# Patient Record
Sex: MALE | Race: WHITE | NOT HISPANIC OR LATINO | Employment: FULL TIME | URBAN - METROPOLITAN AREA
[De-identification: names, ages, dates, MRNs, and addresses within clinical notes are randomized per-mention and may not be internally consistent; named-entity substitution may affect disease eponyms.]

---

## 2017-03-08 ENCOUNTER — ALLSCRIPTS OFFICE VISIT (OUTPATIENT)
Dept: OTHER | Facility: OTHER | Age: 62
End: 2017-03-08

## 2017-03-17 ENCOUNTER — HOSPITAL ENCOUNTER (OUTPATIENT)
Dept: RADIOLOGY | Facility: HOSPITAL | Age: 62
Discharge: HOME | End: 2017-03-17
Attending: RADIOLOGY

## 2017-03-17 DIAGNOSIS — Z76.89 REFERRAL OF PATIENT WITHOUT EXAMINATION OR TREATMENT: ICD-10-CM

## 2017-04-03 DIAGNOSIS — I71.4 ABDOMINAL AORTIC ANEURYSM WITHOUT RUPTURE (HCC): ICD-10-CM

## 2017-10-19 ENCOUNTER — TRANSCRIBE ORDERS (OUTPATIENT)
Dept: ADMINISTRATIVE | Facility: HOSPITAL | Age: 62
End: 2017-10-19

## 2017-10-19 DIAGNOSIS — I71.9 HYALINE NECROSIS OF AORTA (HCC): Primary | ICD-10-CM

## 2017-10-26 ENCOUNTER — HOSPITAL ENCOUNTER (OUTPATIENT)
Dept: NON INVASIVE DIAGNOSTICS | Facility: HOSPITAL | Age: 62
Discharge: HOME | End: 2017-10-26
Payer: COMMERCIAL

## 2017-10-26 DIAGNOSIS — I71.9 HYALINE NECROSIS OF AORTA (HCC): ICD-10-CM

## 2017-10-26 LAB
ARRHY DURING EX: NORMAL
CHEST PAIN STATEMENT: NORMAL
MAX DIASTOLIC BP: 96 MMHG
MAX HEART RATE: 142 BPM
MAX PREDICTED HEART RATE: 157 BPM
MAX. SYSTOLIC BP: 202 MMHG
PROTOCOL NAME: NORMAL
TARGET HR FORMULA: NORMAL
TEST INDICATION: NORMAL
TIME IN EXERCISE PHASE: 396 S

## 2017-10-26 PROCEDURE — 93017 CV STRESS TEST TRACING ONLY: CPT

## 2018-01-16 NOTE — PROGRESS NOTES
Assessment    1  AAA (abdominal aortic aneurysm) without rupture (441 4) (I71 4)    Discussion/Summary  Discussion Summary:   Approximately 5 cm abdominal aortic aneurysm based upon CT and ultrasound reports from an outside facility area and we discussed the pathophysiology of aneurysmal disease and the indications for further evaluation treatment  We will obtain the most recent CT scan and following this further treatment recommendations will be made  We also discussed the familial relationship of aneurysmal disease and the importance of screening of any first-degree relatives  Chief Complaint  Chief Complaint Free Text Note Form: " I am here to have a second opinion about my AAA "   Janeth Gimenez is here today to have his AAA evaluated  PT had CTA ABD / PEl done in 2015 & 2016  PT states he has been told that the AAA is a 5 cm or a 5 5 Cm , he would like to know for sure the size & if surgery is required  PT denies any abdominal pain , decrease in appetite, nausea , vomiting & no trouble passing a BM  PT denies any history of blood clots PT does not take any blood thinners, PT is a nonsmoker        History of Present Illness  HPI: 59-year-old with known abdominal aortic aneurysm presents for evaluation and treatment  Evidently the aneurysm was found approximate 5 years ago as an incidental finding  It has been followed at [facility]  He is been told recently the aneurysm is either 5 or 5 5 cm in size but he is unclear  He denies any abdominal or back pain  He remains active with no limitations  He specifically denies any claudication, rest pain, chest pain or shortness of breath  He states he exercises on a regular basis  He denies a family history of aneurysmal disease  PT states he has been told that the AAA is a 5 cm or a 5 5 Cm , he would like to know for sure the size & if surgery is required  PT denies any abdominal pain , decrease in appetite, nausea , vomiting & no trouble passing a BM   PT denies any history of blood clots PT does not take any blood thinners, PT is a nonsmoker      Review of Systems  Complete Male - Vasc:   Constitutional: no loss in appetite and feeling tired, but no fever, no recent weight gain, no chills and no recent weight loss  Eyes: No sudden vision loss, no blurred vision, no double vision  ENT: nasal discharge, but no earache, no nosebleeds, no sore throat, no hearing loss and no hoarseness  Cardiovascular: no chest pain, regular heart rate  Respiratory: no shortness of breath, no wheezing, no shortness of breath during exertion, no orthopnea and no complaints of PND, but cough  Gastrointestinal: No nausea, No vomiting, no diarrhea, no blood in stool  Genitourinary: no dysuria, no hematuria, No urinary incontinence, no erectile dysfunction  Musculoskeletal: no lumbar pain and joint stiffness, but no joint swelling, no limb pain, no myalgias and no limb swelling  Integumentary: no rash, no lesions, no wounds, no ulcer  Neurological: no dementia, no headache, no numbness, no limb weakness, no dizziness, no difficulty walking  Psychiatric: anxiety and no mood disorder, but no depression  Hematologic/Lymphatic: no bleeding disorder, no easy bruising  ROS Reviewed:   ROS reviewed  Past Medical History  Active Problems And Past Medical History Reviewed: The active problems and past medical history were reviewed and updated today  Surgical History    1  History of Hernia Repair   2  History of Knee Surgery   3  History of Shoulder Surgery  Surgical History Reviewed: The surgical history was reviewed and updated today  Family History  Mother    1  Family history of diabetes mellitus (V18 0) (Z83 3)  Family History Reviewed: The family history was reviewed and updated today  Social History    · Former smoker (X85 23) (T48 533)   · Occasional alcohol use  Social History Reviewed: The social history was reviewed and updated today  Current Meds   1  HydroCHLOROthiazide 25 MG Oral Tablet; Therapy: (Recorded:08Mar2017) to Recorded   2  Lisinopril 20 MG Oral Tablet; Therapy: (Recorded:08Mar2017) to Recorded   3  Zolpidem Tartrate 10 MG Oral Tablet; Therapy: (Recorded:08Mar2017) to Recorded  Medication List Reviewed: The medication list was reviewed and updated today  Allergies    1  No Known Drug Allergies    Vitals  Vital Signs    Recorded: [id number] 03:25PM   Heart Rate 78, R Radial   Pulse Quality Normal, R Radial   Respiration Quality Normal   Respiration 16   Systolic 667, RUE, Sitting   Diastolic 60, RUE, Sitting   Height 5 ft 10 in   Weight 239 lb    BMI Calculated 34 29   BSA Calculated 2 25     Physical Exam    Carotid: right 2+, no bruit heard on the right, left 2+ and no bruit on the left  Brachial: right 2+ and left 2+  Radial: right 2+ and left 2+  Femoral: right 2+, no bruit heard on the right, left 2+ and no bruit on the left  Popliteal: right 2+ and left 2+  Posterior tibialis: right 2+ and left 2+  Distal Pulse Exam: Normal Capillary Refill  Extremities: bilateral ankle pitting edema, bilateral pretibial pitting edema and no foot edema  LE Varicose Veins: No Varicose Veins are Present  The heart rate was normal  The rhythm was regular  Heart sounds: normal S1, normal S2, no S3 and no S4  Murmurs: No murmurs were heard  Pulmonary   Respiratory effort: No increased work of breathing or signs of respiratory distress  Auscultation of lungs: Clear to auscultation  No wheezing, no rales, no rhonchi  Abdomen   Abdomen: Abdomen soft, non-tender, no masses, non distended, no rebound tenderness  Palpable aneurysm  No bruit heard  Pulsatile mass which is nontender consistent with aortic aneurysm  Psychiatric   Orientation to person, place and time: Normal    Mood and affect: Normal    Eyes   Pupils and irises: Equal, round and reactive to light     Ears, Nose, Mouth, and Throat   Hearing: Normal  Neck   Neck: No jugular venous distention, normal ROM and extension  No cervical adenopathy, trachea midline, neck supple  Neurologic Sensory exam normal   Motor skills intact  Musculoskeletal   Gait and station: Normal    Muscle strength/tone: Normal    Skin   Skin and subcutaneous tissue: Normal without rashes or lesions  Palpation of skin and subcutaneous tissue: Normal turgor  Venous Disease: No lipodermatosclerosis, stasis dermatitis, hyperpigmentation, or atrophie chris noted on exam       Results/Data  Diagnostic Studies Reviewed Vasc: I personally reviewed the films/images/results in the office today  My interpretation follows  Vascular Study Review Duplex report from outside facility 4/5/16 with 4 9 cm AAA  CT Scan Review CT scan 10/30/16 from outside facility  Unfortunately the images are not available today though her report indicates a 4 9 cm infrarenal aortic aneurysm with suggestion of focal dissection with intramural thrombus        Signatures   Electronically signed by : Corina Jason MD; Mar  8 2017  4:22PM EST                       (Author)

## 2018-01-22 VITALS
SYSTOLIC BLOOD PRESSURE: 122 MMHG | HEIGHT: 70 IN | WEIGHT: 239 LBS | DIASTOLIC BLOOD PRESSURE: 60 MMHG | RESPIRATION RATE: 16 BRPM | HEART RATE: 78 BPM | BODY MASS INDEX: 34.22 KG/M2

## 2018-02-26 ENCOUNTER — TELEPHONE (OUTPATIENT)
Dept: ADMINISTRATIVE | Facility: HOSPITAL | Age: 63
End: 2018-02-26

## 2018-02-27 NOTE — TELEPHONE ENCOUNTER
Called pt and left a VM for pt to call back and schedule AOIL prior to OV on 03/01/18 with Dr Felix Velasquez

## 2018-02-28 ENCOUNTER — HOSPITAL ENCOUNTER (OUTPATIENT)
Dept: NON INVASIVE DIAGNOSTICS | Facility: CLINIC | Age: 63
Discharge: HOME | End: 2018-02-28
Payer: COMMERCIAL

## 2018-02-28 DIAGNOSIS — I71.4 ABDOMINAL AORTIC ANEURYSM WITHOUT RUPTURE (HCC): ICD-10-CM

## 2018-02-28 PROCEDURE — 93978 VASCULAR STUDY: CPT

## 2018-03-01 ENCOUNTER — LAB (OUTPATIENT)
Dept: LAB | Facility: CLINIC | Age: 63
End: 2018-03-01
Payer: COMMERCIAL

## 2018-03-01 ENCOUNTER — OFFICE VISIT (OUTPATIENT)
Dept: VASCULAR SURGERY | Facility: CLINIC | Age: 63
End: 2018-03-01
Payer: COMMERCIAL

## 2018-03-01 VITALS — DIASTOLIC BLOOD PRESSURE: 82 MMHG | SYSTOLIC BLOOD PRESSURE: 122 MMHG | HEART RATE: 70 BPM | RESPIRATION RATE: 21 BRPM

## 2018-03-01 DIAGNOSIS — I71.4 AAA (ABDOMINAL AORTIC ANEURYSM) WITHOUT RUPTURE (HCC): Primary | Chronic | ICD-10-CM

## 2018-03-01 DIAGNOSIS — I71.4 AAA (ABDOMINAL AORTIC ANEURYSM) WITHOUT RUPTURE (HCC): Chronic | ICD-10-CM

## 2018-03-01 LAB
ANION GAP SERPL CALCULATED.3IONS-SCNC: 7 MMOL/L (ref 4–13)
BUN SERPL-MCNC: 13 MG/DL (ref 5–25)
CALCIUM SERPL-MCNC: 8.4 MG/DL (ref 8.3–10.1)
CHLORIDE SERPL-SCNC: 105 MMOL/L (ref 100–108)
CO2 SERPL-SCNC: 25 MMOL/L (ref 21–32)
CREAT SERPL-MCNC: 0.99 MG/DL (ref 0.6–1.3)
GFR SERPL CREATININE-BSD FRML MDRD: 81 ML/MIN/1.73SQ M
GLUCOSE SERPL-MCNC: 96 MG/DL (ref 65–140)
POTASSIUM SERPL-SCNC: 3.6 MMOL/L (ref 3.5–5.3)
SODIUM SERPL-SCNC: 137 MMOL/L (ref 136–145)

## 2018-03-01 PROCEDURE — 99214 OFFICE O/P EST MOD 30 MIN: CPT | Performed by: SURGERY

## 2018-03-01 PROCEDURE — 36415 COLL VENOUS BLD VENIPUNCTURE: CPT

## 2018-03-01 PROCEDURE — 80048 BASIC METABOLIC PNL TOTAL CA: CPT

## 2018-03-01 PROCEDURE — 93978 VASCULAR STUDY: CPT | Performed by: SURGERY

## 2018-03-01 RX ORDER — LISINOPRIL 20 MG/1
20 TABLET ORAL DAILY
Status: ON HOLD | COMMUNITY
End: 2018-04-24

## 2018-03-01 RX ORDER — HYDROCHLOROTHIAZIDE 25 MG/1
25 TABLET ORAL DAILY
COMMUNITY
End: 2018-08-23 | Stop reason: SDUPTHER

## 2018-03-01 RX ORDER — ZOLPIDEM TARTRATE 10 MG/1
10 TABLET ORAL
COMMUNITY

## 2018-03-01 NOTE — PROGRESS NOTES
Assessment/Plan:    AAA (abdominal aortic aneurysm) without rupture (HCC)  5 8 cm infrarenal abdominal aortic aneurysm by recent duplex evaluation  We discussed this finding along with the indications for further evaluation treatment  At this time will plan on proceeding with CT angiogram to better define aneurysm size and anatomy in planning treatment  Diagnoses and all orders for this visit:    AAA (abdominal aortic aneurysm) without rupture ([facility] [address] )  -     Basic metabolic panel; Future  -     Ambulatory referral to Cardiology; Future  -     CTA abdomen pelvis w wo contrast; Future    Other orders  -     lisinopril (ZESTRIL) 20 mg tablet; Take by mouth  -     zolpidem (AMBIEN) 10 mg tablet; Take by mouth  -     hydrochlorothiazide (HYDRODIURIL) 25 mg tablet; Take by mouth As needed          Subjective:      Patient ID: Landon Villalobos is a 60 y o  male  70-year-old with known abdominal aortic aneurysm presents following recent aortic duplex  He denies any chronic abdominal or back pain though he does have some mild tenderness following recent aortic duplex  He denies any limitations in activity  Specifically he denies any shortness of breath, chest pain or claudication symptoms  The following portions of the patient's history were reviewed and updated as appropriate: allergies, current medications, past family history, past medical history, past social history, past surgical history and problem list     Review of Systems   Constitutional: Positive for fatigue  HENT: Positive for congestion  Eyes: Negative  Respiratory: Positive for cough  Gastrointestinal: Positive for abdominal pain  Endocrine: Negative  Genitourinary: Positive for frequency  Musculoskeletal: Negative  Skin: Negative  Allergic/Immunologic: Positive for environmental allergies and food allergies  Neurological: Negative  Hematological: Negative      Psychiatric/Behavioral: Positive for sleep disturbance  Objective:      /82 (BP Location: Right arm, Patient Position: Sitting, Cuff Size: Standard)   Pulse 70   Resp 21          Physical Exam   Constitutional: He is oriented to person, place, and time  He appears well-developed and well-nourished  HENT:   Head: Normocephalic and atraumatic  Eyes: Conjunctivae and EOM are normal    Neck: Normal range of motion  Neck supple  Cardiovascular: Normal rate, regular rhythm, S1 normal, S2 normal and normal heart sounds  No murmur heard  Pulses:       Carotid pulses are 2+ on the right side, and 2+ on the left side  Radial pulses are 2+ on the right side, and 2+ on the left side  Femoral pulses are 2+ on the right side, and 2+ on the left side  Popliteal pulses are 2+ on the right side, and 2+ on the left side  Posterior tibial pulses are 2+ on the right side, and 2+ on the left side  Pulmonary/Chest: Effort normal and breath sounds normal    Abdominal: Soft  He exhibits pulsatile midline mass  He exhibits no abdominal bruit  There is no tenderness  No hernia  Musculoskeletal: Normal range of motion  He exhibits no edema, tenderness or deformity  Neurological: He is alert and oriented to person, place, and time  No cranial nerve deficit  Skin: Skin is warm, dry and intact  Psychiatric: He has a normal mood and affect

## 2018-03-01 NOTE — ASSESSMENT & PLAN NOTE
5 8 cm infrarenal abdominal aortic aneurysm by recent duplex evaluation  We discussed this finding along with the indications for further evaluation treatment  At this time will plan on proceeding with CT angiogram to better define aneurysm size and anatomy in planning treatment

## 2018-03-01 NOTE — LETTER
March 1, 2018     Verónica Martinez, [address]  [address]  [facility] [address]    Patient: Anika Mahan   YOB: 1955   Date of Visit: 3/1/2018       Dear Dr Pradip Shah:    Thank you for referring Dania Hidalgo to me for evaluation  Below are the relevant portions of my assessment and plan of care  AAA (abdominal aortic aneurysm) without rupture (HCC)  5 8 cm infrarenal abdominal aortic aneurysm by recent duplex evaluation  We discussed this finding along with the indications for further evaluation treatment  At this time will plan on proceeding with CT angiogram to better define aneurysm size and anatomy in planning treatment  If you have questions, please do not hesitate to call me  I look forward to following Fredrick Loco along with you           Sincerely,        Fernando Lane MD        CC: No Recipients

## 2018-03-01 NOTE — PATIENT INSTRUCTIONS
AAA (abdominal aortic aneurysm) without rupture (HCC)  5 8 cm infrarenal abdominal aortic aneurysm by recent duplex evaluation  We discussed this finding along with the indications for further evaluation treatment  At this time will plan on proceeding with CT angiogram to better define aneurysm size and anatomy in planning treatment

## 2018-03-05 ENCOUNTER — HOSPITAL ENCOUNTER (OUTPATIENT)
Dept: CT IMAGING | Facility: CLINIC | Age: 63
Discharge: HOME | End: 2018-03-05
Payer: COMMERCIAL

## 2018-03-05 DIAGNOSIS — I71.4 AAA (ABDOMINAL AORTIC ANEURYSM) WITHOUT RUPTURE (HCC): Chronic | ICD-10-CM

## 2018-03-05 PROCEDURE — 74174 CTA ABD&PLVS W/CONTRAST: CPT

## 2018-03-05 RX ADMIN — IOHEXOL 100 ML: 350 INJECTION, SOLUTION INTRAVENOUS at 13:14

## 2018-03-07 ENCOUNTER — HOSPITAL ENCOUNTER (EMERGENCY)
Facility: HOSPITAL | Age: 63
Discharge: HOME | End: 2018-03-08
Attending: EMERGENCY MEDICINE | Admitting: EMERGENCY MEDICINE
Payer: COMMERCIAL

## 2018-03-07 DIAGNOSIS — R07.89 MUSCULOSKELETAL CHEST PAIN: Primary | ICD-10-CM

## 2018-03-07 LAB
ANION GAP BLD CALC-SCNC: 14 MMOL/L (ref 4–13)
BUN BLD-MCNC: 8 MG/DL (ref 5–25)
CA-I BLD-SCNC: 1.04 MMOL/L (ref 1.12–1.32)
CHLORIDE BLD-SCNC: 105 MMOL/L (ref 100–108)
CREAT BLD-MCNC: 0.9 MG/DL (ref 0.6–1.3)
GFR SERPL CREATININE-BSD FRML MDRD: 91 ML/MIN/1.73SQ M
GLUCOSE SERPL-MCNC: 164 MG/DL (ref 65–140)
HCT VFR BLD CALC: 43 % (ref 36.5–49.3)
HGB BLDA-MCNC: 14.6 G/DL (ref 12–17)
PCO2 BLD: 26 MMOL/L (ref 21–32)
POTASSIUM BLD-SCNC: 3.2 MMOL/L (ref 3.5–5.3)
SODIUM BLD-SCNC: 141 MMOL/L (ref 136–145)
SPECIMEN SOURCE: ABNORMAL
SPECIMEN SOURCE: NORMAL
TROPONIN I BLD-MCNC: 0 NG/ML (ref 0–0.08)

## 2018-03-07 PROCEDURE — 85014 HEMATOCRIT: CPT

## 2018-03-07 PROCEDURE — 81001 URINALYSIS AUTO W/SCOPE: CPT | Performed by: EMERGENCY MEDICINE

## 2018-03-07 PROCEDURE — 85610 PROTHROMBIN TIME: CPT | Performed by: EMERGENCY MEDICINE

## 2018-03-07 PROCEDURE — 93005 ELECTROCARDIOGRAM TRACING: CPT

## 2018-03-07 PROCEDURE — 85025 COMPLETE CBC W/AUTO DIFF WBC: CPT | Performed by: EMERGENCY MEDICINE

## 2018-03-07 PROCEDURE — 36415 COLL VENOUS BLD VENIPUNCTURE: CPT | Performed by: EMERGENCY MEDICINE

## 2018-03-07 PROCEDURE — 80047 BASIC METABLC PNL IONIZED CA: CPT

## 2018-03-07 PROCEDURE — 96360 HYDRATION IV INFUSION INIT: CPT

## 2018-03-07 PROCEDURE — 84484 ASSAY OF TROPONIN QUANT: CPT

## 2018-03-07 PROCEDURE — 85730 THROMBOPLASTIN TIME PARTIAL: CPT | Performed by: EMERGENCY MEDICINE

## 2018-03-07 RX ADMIN — SODIUM CHLORIDE 1000 ML: 0.9 INJECTION, SOLUTION INTRAVENOUS at 23:35

## 2018-03-08 ENCOUNTER — APPOINTMENT (EMERGENCY)
Dept: CT IMAGING | Facility: HOSPITAL | Age: 63
End: 2018-03-08
Payer: COMMERCIAL

## 2018-03-08 VITALS
TEMPERATURE: 97 F | DIASTOLIC BLOOD PRESSURE: 92 MMHG | HEART RATE: 63 BPM | BODY MASS INDEX: 33.64 KG/M2 | OXYGEN SATURATION: 94 % | SYSTOLIC BLOOD PRESSURE: 174 MMHG | WEIGHT: 235 LBS | HEIGHT: 70 IN | RESPIRATION RATE: 22 BRPM

## 2018-03-08 LAB
APTT PPP: 36 SECONDS (ref 23–35)
BACTERIA UR QL AUTO: ABNORMAL /HPF
BASOPHILS # BLD AUTO: 0.05 THOUSANDS/ΜL (ref 0–0.1)
BASOPHILS NFR BLD AUTO: 1 % (ref 0–1)
BILIRUB UR QL STRIP: NEGATIVE
CLARITY UR: CLEAR
COLOR UR: YELLOW
EOSINOPHIL # BLD AUTO: 0.24 THOUSAND/ΜL (ref 0–0.61)
EOSINOPHIL NFR BLD AUTO: 4 % (ref 0–6)
ERYTHROCYTE [DISTWIDTH] IN BLOOD BY AUTOMATED COUNT: 15.1 % (ref 11.6–15.1)
GLUCOSE UR STRIP-MCNC: NEGATIVE MG/DL
HCT VFR BLD AUTO: 43.9 % (ref 36.5–49.3)
HGB BLD-MCNC: 14.6 G/DL (ref 12–17)
HGB UR QL STRIP.AUTO: ABNORMAL
INR PPP: 1.17 (ref 0.86–1.16)
KETONES UR STRIP-MCNC: NEGATIVE MG/DL
LEUKOCYTE ESTERASE UR QL STRIP: NEGATIVE
LYMPHOCYTES # BLD AUTO: 2.43 THOUSANDS/ΜL (ref 0.6–4.47)
LYMPHOCYTES NFR BLD AUTO: 36 % (ref 14–44)
MCH RBC QN AUTO: 32 PG (ref 26.8–34.3)
MCHC RBC AUTO-ENTMCNC: 33.3 G/DL (ref 31.4–37.4)
MCV RBC AUTO: 96 FL (ref 82–98)
MONOCYTES # BLD AUTO: 0.78 THOUSAND/ΜL (ref 0.17–1.22)
MONOCYTES NFR BLD AUTO: 12 % (ref 4–12)
NEUTROPHILS # BLD AUTO: 3.17 THOUSANDS/ΜL (ref 1.85–7.62)
NEUTS SEG NFR BLD AUTO: 47 % (ref 43–75)
NITRITE UR QL STRIP: NEGATIVE
NON-SQ EPI CELLS URNS QL MICRO: ABNORMAL /HPF
PH UR STRIP.AUTO: 7 [PH] (ref 4.5–8)
PLATELET # BLD AUTO: 103 THOUSANDS/UL (ref 149–390)
PMV BLD AUTO: 10.2 FL (ref 8.9–12.7)
PROT UR STRIP-MCNC: NEGATIVE MG/DL
PROTHROMBIN TIME: 14.7 SECONDS (ref 12.1–14.4)
RBC # BLD AUTO: 4.56 MILLION/UL (ref 3.88–5.62)
RBC #/AREA URNS AUTO: ABNORMAL /HPF
SP GR UR STRIP.AUTO: 1.02 (ref 1–1.03)
SPECIMEN SOURCE: NORMAL
TROPONIN I BLD-MCNC: 0.02 NG/ML (ref 0–0.08)
UROBILINOGEN UR QL STRIP.AUTO: 0.2 E.U./DL
WBC # BLD AUTO: 6.67 THOUSAND/UL (ref 4.31–10.16)
WBC #/AREA URNS AUTO: ABNORMAL /HPF

## 2018-03-08 PROCEDURE — 96361 HYDRATE IV INFUSION ADD-ON: CPT

## 2018-03-08 PROCEDURE — 84484 ASSAY OF TROPONIN QUANT: CPT

## 2018-03-08 PROCEDURE — 74175 CTA ABDOMEN W/CONTRAST: CPT

## 2018-03-08 PROCEDURE — 99285 EMERGENCY DEPT VISIT HI MDM: CPT

## 2018-03-08 PROCEDURE — 71275 CT ANGIOGRAPHY CHEST: CPT

## 2018-03-08 PROCEDURE — 93005 ELECTROCARDIOGRAM TRACING: CPT | Performed by: EMERGENCY MEDICINE

## 2018-03-08 RX ORDER — POTASSIUM CHLORIDE 20 MEQ/1
20 TABLET, EXTENDED RELEASE ORAL ONCE
Status: DISCONTINUED | OUTPATIENT
Start: 2018-03-08 | End: 2018-03-08

## 2018-03-08 RX ORDER — POTASSIUM CHLORIDE 14.9 MG/ML
20 INJECTION INTRAVENOUS ONCE
Status: DISCONTINUED | OUTPATIENT
Start: 2018-03-08 | End: 2018-03-08

## 2018-03-08 RX ADMIN — IOHEXOL 125 ML: 350 INJECTION, SOLUTION INTRAVENOUS at 02:26

## 2018-03-08 NOTE — ED NOTES
Pt ambulated to bathroom to void without difficulty  Denies any chest discomfort, no SOB, tolerated well        Kendal Bowen, RN  03/08/18 0733

## 2018-03-08 NOTE — ED PROVIDER NOTES
History  Chief Complaint   Patient presents with    Back Pain     HX OF ANEURYSM    Chest Pain      This 67-year-old man with history of abdominal aortic aneurysm and hypertension states that he has had intermittent pain just to the right of the midthoracic spine intermittently for about a week  This has been constant since 7 p m  tonight and has not resolved  Feels better when he pushes against it otherwise there are no exacerbating or other alleviating factors  Patient has had intermittent abdominal pain for several days  He had a CTA of his abdominal aorta for follow-up on a fusiform infrarenal AAA  Currently it has enlarged to 59mm x 58mm he is concerned that this pain is lasting longer than before and may be due to his aneurysm  Patient denies nausea, diaphoresis, dyspnea, palpitations and syncope  Prior to Admission Medications   Prescriptions Last Dose Informant Patient Reported? Taking?   hydrochlorothiazide (HYDRODIURIL) 25 mg tablet   Yes No   Sig: Take by mouth As needed   lisinopril (ZESTRIL) 20 mg tablet   Yes No   Sig: Take by mouth   zolpidem (AMBIEN) 10 mg tablet   Yes No   Sig: Take by mouth      Facility-Administered Medications: None       Past Medical History:   Diagnosis Date    Abdominal aortic aneurysm (AAA) ([facility] [address] )     Liver disease        Past Surgical History:   Procedure Laterality Date    HERNIA REPAIR         Family History   Problem Relation Age of Onset    Pancreatic cancer Mother     Diabetes Mother     Cancer Father     Diabetes Father      I have reviewed and agree with the history as documented  Social History   Substance Use Topics    Smoking status: Former Smoker     Quit date: 1990    Smokeless tobacco: Never Used    Alcohol use Yes      Comment: Ocassional        Review of Systems   Constitutional: Negative  HENT: Negative  Eyes: Negative  Respiratory: Negative  Cardiovascular: Negative  Gastrointestinal: Negative    Abdominal pain: intermittently for the last 2 weeks  Endocrine: Negative  Genitourinary: Negative  Musculoskeletal: Negative  Back pain:  intermittent pain thoracic region for one week  Skin: Negative  Allergic/Immunologic: Negative  Neurological: Negative  Hematological: Negative  Psychiatric/Behavioral: Positive for dysphoric mood and sleep disturbance  All other systems reviewed and are negative  Physical Exam  ED Triage Vitals [03/07/18 2330]   Temperature Pulse Respirations Blood Pressure SpO2   (!) 96 8 °F (36 °C) 73 (!) 27 (!) 195/107 98 %      Temp Source Heart Rate Source Patient Position - Orthostatic VS BP Location FiO2 (%)   Temporal Monitor Sitting Right arm --      Pain Score       6           Orthostatic Vital Signs  Vitals:    03/08/18 0400 03/08/18 0415 03/08/18 0416 03/08/18 0430   BP: 167/94 169/86  (!) 174/92   Pulse: 62 64 63 63   Patient Position - Orthostatic VS: Sitting Sitting         Physical Exam   Constitutional: He is oriented to person, place, and time  He appears well-developed and well-nourished  No distress  HENT:   Head: Normocephalic and atraumatic  Right Ear: External ear normal    Left Ear: External ear normal    Mouth/Throat: Oropharynx is clear and moist    Eyes: Conjunctivae and EOM are normal  Pupils are equal, round, and reactive to light  Neck: Normal range of motion  Neck supple  No JVD present  Cardiovascular: Normal rate, regular rhythm, normal heart sounds and intact distal pulses  No murmur heard  Pulmonary/Chest: Effort normal and breath sounds normal  Tenderness:  palpation of theRight thoracic paraspinal muscles reproduces patient's symptoms  Abdominal: Soft  Bowel sounds are normal  He exhibits no distension and no mass  Tenderness:  mild generalized  There is no rebound and no guarding  Musculoskeletal: Normal range of motion  He exhibits no edema or tenderness  Lymphadenopathy:     He has no cervical adenopathy     Neurological: He is alert and oriented to person, place, and time  He has normal reflexes  No cranial nerve deficit  Coordination normal    Skin: Skin is warm and dry  Capillary refill takes less than 2 seconds  No rash noted  He is not diaphoretic  Psychiatric: He has a normal mood and affect  His behavior is normal    Nursing note and vitals reviewed  ED Medications  Medications   sodium chloride 0 9 % bolus 1,000 mL (0 mL Intravenous Stopped 3/8/18 0130)   iohexol (OMNIPAQUE) 350 MG/ML injection (MULTI-DOSE) 125 mL (125 mL Intravenous Given 3/8/18 0226)       Diagnostic Studies  Results Reviewed     Procedure Component Value Units Date/Time    POCT troponin [[id number]]  (Normal) Collected:  03/08/18 0330    Lab Status:  Final result Updated:  03/08/18 0344     POC Troponin I 0 02 ng/ml      Specimen Type VENOUS    Narrative:         [employer]ott i-Stat handheld analyzer 99% cutoff is > 0 08ng/mL in [facility] Emergency Departments    o cTnI 99% cutoff is useful only when applied to patients in the clinical setting of myocardial ischemia  o cTnI 99% cutoff should be interpreted in the context of clinical history, ECG findings and possibly cardiac imaging to establish correct diagnosis  o cTnI 99% cutoff may be suggestive but clearly not indicative of a coronary event without the clinical setting of myocardial ischemia  Protime-INR [[id number]]  (Abnormal) Collected:  03/07/18 2333    Lab Status:  Final result Specimen:  Blood from Arm, Left Updated:  03/08/18 0109     Protime 14 7 (H) seconds      INR 1 17 (H)    APTT [[id number]]  (Abnormal) Collected:  03/07/18 2333    Lab Status:  Final result Specimen:  Blood from Arm, Left Updated:  03/08/18 0109     PTT 36 (H) seconds     Narrative:          Therapeutic Heparin Range = 60-90 seconds    Urine Microscopic [[id number]]  (Abnormal) Collected:  03/07/18 2342    Lab Status:  Final result Specimen:  Urine from Urine, Clean Catch Updated:  03/08/18 0056     RBC, UA 1-2 (A) /hpf      WBC, UA None Seen /hpf      Epithelial Cells Occasional /hpf      Bacteria, UA Occasional /hpf     UA w Reflex to Microscopic [[id number]]  (Abnormal) Collected:  03/07/18 2342    Lab Status:  Final result Specimen:  Urine from Urine, Clean Catch Updated:  03/08/18 0041     Color, UA Yellow     Clarity, UA Clear     Specific [place], UA 1 020     pH, UA 7 0     Leukocytes, UA Negative     Nitrite, UA Negative     Protein, UA Negative mg/dl      Glucose, UA Negative mg/dl      Ketones, UA Negative mg/dl      Urobilinogen, UA 0 2 E U /dl      Bilirubin, UA Negative     Blood, UA Small (A)    CBC and differential [[id number]]  (Abnormal) Collected:  03/07/18 2333    Lab Status:  Final result Specimen:  Blood from Arm, Left Updated:  03/08/18 0033     WBC 6 67 Thousand/uL      RBC 4 56 Million/uL      Hemoglobin 14 6 g/dL      Hematocrit 43 9 %      MCV 96 fL      MCH 32 0 pg      MCHC 33 3 g/dL      RDW 15 1 %      MPV 10 2 fL      Platelets 050 (L) Thousands/uL      Neutrophils Relative 47 %      Lymphocytes Relative 36 %      Monocytes Relative 12 %      Eosinophils Relative 4 %      Basophils Relative 1 %      Neutrophils Absolute 3 17 Thousands/µL      Lymphocytes Absolute 2 43 Thousands/µL      Monocytes Absolute 0 78 Thousand/µL      Eosinophils Absolute 0 24 Thousand/µL      Basophils Absolute 0 05 Thousands/µL     POCT troponin [[id number]]  (Normal) Collected:  03/07/18 2344    Lab Status:  Final result Updated:  03/07/18 2357     POC Troponin I 0 00 ng/ml      Specimen Type VENOUS    Narrative:         [employer] i-Stat handheld analyzer 99% cutoff is > 0 08ng/mL in network Emergency Departments    o cTnI 99% cutoff is useful only when applied to patients in the clinical setting of myocardial ischemia  o cTnI 99% cutoff should be interpreted in the context of clinical history, ECG findings and possibly cardiac imaging to establish correct diagnosis    o cTnI 99% cutoff may be suggestive but clearly not indicative of a coronary event without the clinical setting of myocardial ischemia  POCT Chem 8+ [[id number]]  (Abnormal) Collected:  03/07/18 2341    Lab Status:  Final result Updated:  03/07/18 2357     SODIUM, I-STAT 141 mmol/l      Potassium, i-STAT 3 2 (L) mmol/L      Chloride, istat 105 mmol/L      CO2, i-STAT 26 mmol/L      Anion Gap, Istat 14 (H) mmol/L      Calcium, Ionized i-STAT 1 04 (L) mmol/L      BUN, I-STAT 8 mg/dl      Creatinine, i-STAT 0 9 mg/dl      eGFR 91 ml/min/1 73sq m      Glucose, i-STAT 164 (H) mg/dl      Hct, i-STAT 43 %      Hgb, i-STAT 14 6 g/dl      Specimen Type VENOUS                 CTA dissection protocol chest and abdomen    (Results Pending)              Procedures  ECG 12 Lead Documentation  Date/Time: 3/7/2018 11:23 PM  Performed by: Beckie Gonsales  Authorized by: Beckie Gonsales     ECG reviewed by me, the ED Provider: yes    Patient location:  ED  Previous ECG:     Previous ECG:  Unavailable  Interpretation:     Interpretation: normal             Phone Contacts  ED Phone Contact    ED Course  ED Course as of Mar 08 0440   Thu Mar 08, 2018   0336   Repeat EKG at 3:23 AM is unchanged    1428  Patient states that his back pain radiating to the right side of his chest became worse as he moved from CT table to the stretcher  Afterwards pain resolved and he feels fine now  0231   Patient is pain-free            HEART Risk Score    Flowsheet Row Most Recent Value   History  0 Filed at: 03/08/2018 0415   ECG  0 Filed at: 03/08/2018 0415   Age  1 Filed at: 03/08/2018 0415   Risk Factors  1 Filed at: 03/08/2018 0415   Troponin  0 Filed at: 03/08/2018 0415   Heart Score Risk Calculator   History  0 Filed at: 03/08/2018 0415   ECG  0 Filed at: 03/08/2018 0415   Age  1 Filed at: 03/08/2018 0415   Risk Factors  1 Filed at: 03/08/2018 0415   Troponin  0 Filed at: 03/08/2018 0415   HEART Score  2 Filed at: 03/08/2018 0415   HEART Score  2 Filed at: 03/08/2018 6114 MDM  Number of Diagnoses or Management Options  Musculoskeletal chest pain: new and requires workup     Amount and/or Complexity of Data Reviewed  Clinical lab tests: ordered and reviewed  Tests in the radiology section of CPT®: ordered and reviewed  Review and summarize past medical records: yes  Independent visualization of images, tracings, or specimens: yes      CritCare Time    Disposition  Final diagnoses:   Musculoskeletal chest pain     Time reflects when diagnosis was documented in both MDM as applicable and the Disposition within this note     Time User Action Codes Description Comment    3/8/2018  4:19 AM Tae Tiwari Add [R07 89] Musculoskeletal chest pain       ED Disposition     ED Disposition Condition Comment    Discharge  [facility] discharge to home/self care  Condition at discharge: Stable        Follow-up Information     Follow up With Specialties Details Why Contact Info    ELIZABETH Gil Nurse Practitioner Call today  if you have any continued symptoms CAROLIN Avalos 267 [phone]          Patient's Medications   Discharge Prescriptions    No medications on file     No discharge procedures on file      ED Provider  Electronically Signed by           Monroe Lesch, DO  03/08/18 7431

## 2018-03-08 NOTE — DISCHARGE INSTRUCTIONS
Chest Wall Pain   WHAT YOU NEED TO KNOW:   Chest wall pain may be caused by problems with the muscles, cartilage, or bones of the chest wall  Chest wall pain may also be caused by pain that spreads to your chest from another part of your body  The pain may be aching, severe, dull, or sharp  It may come and go, or it may be constant  The pain may be worse when you move in certain ways, breathe deeply, or cough  DISCHARGE INSTRUCTIONS:   Call 911 if:   · You have any of the following signs of a heart attack:      ¨ Squeezing, pressure, or pain in your chest that lasts longer than 5 minutes or returns    ¨ Discomfort or pain in your back, neck, jaw, stomach, or arm     ¨ Trouble breathing    ¨ Nausea or vomiting    ¨ Lightheadedness or a sudden cold sweat, especially with chest pain or trouble breathing    Return to the emergency department if:   · You have severe pain  Contact your healthcare provider if:   · You develop a rash  · You have other new symptoms  · Your pain does not improve, even with treatment  · You have questions or concerns about your condition or care  Medicines: You may need any of the following:  · NSAIDs , such as ibuprofen, help decrease swelling, pain, and fever  This medicine is available with or without a doctor's order  NSAIDs can cause stomach bleeding or kidney problems in certain people  If you take blood thinner medicine, always ask your healthcare provider if NSAIDs are safe for you  Always read the medicine label and follow directions  · Acetaminophen  decreases pain  It is available without a doctor's order  Ask how much to take and how often to take it  Follow directions  Acetaminophen can cause liver damage if not taken correctly  · A cream  may be applied to your chest to decrease pain  · Take your medicine as directed  Contact your healthcare provider if you think your medicine is not helping or if you have side effects   Tell him of her if you are allergic to any medicine  Keep a list of the medicines, vitamins, and herbs you take  Include the amounts, and when and why you take them  Bring the list or the pill bottles to follow-up visits  Carry your medicine list with you in case of an emergency  Follow up with your healthcare provider as directed:  Write down your questions so you remember to ask them during your visits  Self-care:   · Rest  as needed  Avoid activities that make your chest wall pain worse  · Apply heat  on your chest for 20 to 30 minutes every 2 hours for as many days as directed  Heat helps decrease pain and muscle spasms  · Apply ice  on your chest for 15 to 20 minutes every hour or as directed  Use an ice pack, or put crushed ice in a plastic bag  Cover it with a towel  Ice helps prevent tissue damage and decreases swelling and pain  © 2017 [address] [facility] Information is for End User's use only and may not be sold, redistributed or otherwise used for commercial purposes  All illustrations and images included in CareNotes® are the copyrighted property of A D A M [employer]  or [address]  The above information is an  only  It is not intended as medical advice for individual conditions or treatments  Talk to your doctor, nurse or pharmacist before following any medical regimen to see if it is safe and effective for you  Nonruptured Abdominal Aortic Aneurysm   AMBULATORY CARE:   What you need to know about an abdominal aortic aneurysm (AAA): The aorta is a large blood vessel that extends from your heart to your abdomen  The part of the aorta that extends into your abdomen is called your abdominal aorta  Your abdominal aorta brings blood to your stomach, pelvis, and legs  An AAA is a bulging or weak area in your abdominal aorta  Over time, the bulge may grow and is at risk for tearing or rupturing  An AAA that ruptures is a life-threatening emergency  Signs and symptoms:   An AAA usually does not have signs or symptoms if it has not ruptured  If the AAA starts to leak or ruptures, you may have any of the following:  · Sudden pain in your abdomen, groin, back, legs, or buttocks    · Nausea and vomiting    · A lump or swelling in your abdomen    · Stiff abdominal muscles    · Numbness or tingling in your legs    · Pale, sweaty, or clammy skin    · Dizziness, fainting or loss of consciousness  Call 911 or have someone else call for any of the following:   · You faint or lose consciousness  · You cannot be woken  Seek care immediately if:  The following signs or symptoms may mean the AAA is at risk of rupturing:  · You have sudden sharp pain in your abdomen, groin, back, legs, or buttocks  · You have nausea and vomiting  · You feel dizzy  · You have stiffness or swelling in your abdomen, or a lump in your abdomen  · You have numbness or tingling in your legs  · Your skin is pale, sweaty, or clammy  Contact your healthcare provider if:   · You have questions or concerns about your condition or care  Treatment of an AAA  may not be needed  Your healthcare provider may monitor the size of your AAA with tests, such as an ultrasound  You may be given medicines to prevent the AAA from growing  Examples include blood pressure medicine and medicine to lower your cholesterol  If your AAA gets bigger, starts to leak, or ruptures, you may need any of the following:  · Endovascular repair  is a procedure that uses a graft to repair your AAA  The graft stops blood flow to the aneurysm and protects your abdominal aorta  You may need to have more than 1 endovascular repair  · Open repair  is surgery to repair or remove an AAA  Manage a nonruptured AAA:  You can help prevent your AAA from growing or rupturing by doing the following:  · Do not smoke  Nicotine and other chemicals in cigarettes and cigars can increase your blood pressure   It can also damage your aorta and increase the size of your AAA  Ask your healthcare provider for information if you currently smoke and need help to quit  E-cigarettes or smokeless tobacco still contain nicotine  Talk to your healthcare provider before you use these products  · Exercise as directed  Exercise can help control your blood pressure and cholesterol level  Ask your healthcare provider how much exercise you need each day and which exercises are best for you  · Follow the meal plan recommended by your healthcare provider  Talk to your dietitian about a heart-healthy or low-sodium eating plan  Meal plans will help you lower your cholesterol and blood pressure  They will also help you reach a healthy weight  · Do not lift anything heavier than 10 pounds  Heavy lifting can increase pressure in your abdominal aorta  This can increase your risk for a ruptured AAA  Follow up with your healthcare provider as directed: You will need regular tests and follow-up visits to monitor the size of your AAA  Keep all appointments  Write down your questions so you remember to ask them during your visits  © 2017 [address] [facility] Information is for End User's use only and may not be sold, redistributed or otherwise used for commercial purposes  All illustrations and images included in CareNotes® are the copyrighted property of [employer]  or Dontae Rich  The above information is an  only  It is not intended as medical advice for individual conditions or treatments  Talk to your doctor, nurse or pharmacist before following any medical regimen to see if it is safe and effective for you

## 2018-03-08 NOTE — ED NOTES
Radiology Tech requests different IV site, (20 ga left AC flushes well with + blood return), new IV site as requested, 18 ga left upper arm, flushes easily, + blood return       Denise Moody, RN  03/08/18 9386

## 2018-03-08 NOTE — PROGRESS NOTES
Called patient about pulmonary nodule seen on CT scan  Patient will need to f/u with PCP for repeat CT scan in 12months for recheck  LMOM x 1

## 2018-03-12 ENCOUNTER — TELEPHONE (OUTPATIENT)
Dept: ADMINISTRATIVE | Facility: HOSPITAL | Age: 63
End: 2018-03-12

## 2018-03-12 NOTE — TELEPHONE ENCOUNTER
----- Message from Flaquito Holden MA sent at 3/8/2018  3:13 PM EST -----      ----- Message -----  From: Chitra Saenz MD  Sent: 3/7/2018  12:37 PM  To: [facility]    [facility] ov f/u  ----- Message -----  From: Interface, Radiology Results In  Sent: 3/7/2018  11:36 AM  To:  Chitra Saenz MD

## 2018-03-13 LAB
ATRIAL RATE: 61 BPM
ATRIAL RATE: 74 BPM
P AXIS: 25 DEGREES
P AXIS: 32 DEGREES
PR INTERVAL: 186 MS
PR INTERVAL: 192 MS
QRS AXIS: 36 DEGREES
QRS AXIS: 46 DEGREES
QRSD INTERVAL: 90 MS
QRSD INTERVAL: 90 MS
QT INTERVAL: 428 MS
QT INTERVAL: 478 MS
QTC INTERVAL: 475 MS
QTC INTERVAL: 481 MS
T WAVE AXIS: 86 DEGREES
T WAVE AXIS: 88 DEGREES
VENTRICULAR RATE: 61 BPM
VENTRICULAR RATE: 74 BPM

## 2018-03-13 PROCEDURE — 93010 ELECTROCARDIOGRAM REPORT: CPT | Performed by: INTERNAL MEDICINE

## 2018-03-16 ENCOUNTER — OFFICE VISIT (OUTPATIENT)
Dept: CARDIOLOGY CLINIC | Facility: CLINIC | Age: 63
End: 2018-03-16
Payer: COMMERCIAL

## 2018-03-16 VITALS
HEIGHT: 70 IN | BODY MASS INDEX: 37.08 KG/M2 | SYSTOLIC BLOOD PRESSURE: 170 MMHG | HEART RATE: 72 BPM | WEIGHT: 259 LBS | DIASTOLIC BLOOD PRESSURE: 100 MMHG

## 2018-03-16 DIAGNOSIS — I71.4 AAA (ABDOMINAL AORTIC ANEURYSM) WITHOUT RUPTURE (HCC): Chronic | ICD-10-CM

## 2018-03-16 PROCEDURE — 99204 OFFICE O/P NEW MOD 45 MIN: CPT | Performed by: INTERNAL MEDICINE

## 2018-03-16 NOTE — PROGRESS NOTES
[address] Card[facility] [place]  [address]  [facility] [facility]  [phone]    Cardiology Follow up    Patient:  Gary Carroll  :  1955  MRN:  [id number]    History of Present Illness:     65-year-old man with past medical history of hypertension and unspecified liver disease presents for preoperative cardiovascular evaluation prior to a 5 8 cm abdominal aortic aneurysm repair  He notes no exertional chest pain but did get an episode of back pain in the lower back last week-the pain radiated to his right chest and he noted significant hypertension because the pain was so significant  He went to the emergency room and was sent home  He notes no exertional shortness of breath and further denies any palpitations or syncope  He had again an episode of dizziness not too long ago  Patient Active Problem List   Diagnosis    AAA (abdominal aortic aneurysm) without rupture [facility])       Past Surgical History  Past Surgical History:   Procedure Laterality Date    HERNIA REPAIR         Social History   Social History     Social History    Marital status: Single     Spouse name: N/A    Number of children: N/A    Years of education: N/A     Occupational History    Not on file  Social History Main Topics    Smoking status: Former Smoker     Quit date:     Smokeless tobacco: Never Used    Alcohol use Yes      Comment: Ocassional    Drug use: No    Sexual activity: Not on file     Other Topics Concern    Not on file     Social History Narrative    No narrative on file        No Known Allergies    Family History   Family History   Problem Relation Age of Onset    Pancreatic cancer Mother     Diabetes Mother     Cancer Father     Diabetes Father        Review of Systems:  Review of Systems   Constitutional: Negative for chills, fatigue and fever  HENT: Negative for hearing loss and trouble swallowing  Eyes: Negative for pain     Respiratory: Negative for cough, chest tightness and shortness of breath  Cardiovascular: Positive for leg swelling  Negative for chest pain and palpitations  Gastrointestinal: Negative for abdominal pain, blood in stool, nausea and vomiting  Endocrine: Negative for cold intolerance and heat intolerance  Genitourinary: Negative for difficulty urinating, frequency and hematuria  Musculoskeletal: Negative for arthralgias and neck pain  Skin: Negative for rash  Allergic/Immunologic: Negative for environmental allergies  Neurological: Negative for dizziness, weakness and headaches  Hematological: Does not bruise/bleed easily  Psychiatric/Behavioral: Negative for decreased concentration and sleep disturbance  The patient is not nervous/anxious  Current Outpatient Prescriptions:     hydrochlorothiazide (HYDRODIURIL) 25 mg tablet, Take by mouth As needed, Disp: , Rfl:     lisinopril (ZESTRIL) 20 mg tablet, Take by mouth, Disp: , Rfl:     zolpidem (AMBIEN) 10 mg tablet, Take by mouth, Disp: , Rfl:      Physical Exam:    There were no vitals filed for this visit  Physical Exam   Constitutional: He is oriented to person, place, and time  He appears well-developed and well-nourished  HENT:   Head: Normocephalic  Right Ear: External ear normal    Left Ear: External ear normal    Mouth/Throat: Oropharynx is clear and moist    Eyes: Pupils are equal, round, and reactive to light  Neck: No JVD present  Carotid bruit is not present  Cardiovascular: Normal rate, regular rhythm and intact distal pulses  Exam reveals no gallop and no friction rub  No murmur heard  Pulmonary/Chest: Effort normal and breath sounds normal  No tachypnea  No respiratory distress  He has no wheezes  He has no rales  He exhibits no tenderness  Abdominal: Soft  He exhibits no distension  There is no tenderness  There is no rebound and no guarding  Musculoskeletal: He exhibits no edema     Neurological: He is alert and oriented to person, place, and time  Skin: Skin is warm and dry  Psychiatric: He has a normal mood and affect  His behavior is normal  Judgment and thought content normal    Nursing note and vitals reviewed  Labs:not applicable    Assessment/Plan:    1  Preoperative evaluation prior to abdominal aortic aneurysm repair-he has negative stress test from a few months ago and has no symptoms concerning for acute coronary syndrome or stable angina  At this time he is acceptable risk for repair of his abdominal aortic aneurysm  He may proceed to the operating room without further testing  We can consider adding a statin Lipitor 40 mg nightly during the perioperative period as this at the potential to reduce cardiovascular events perioperatively  I am hesitant to add this at the moment without further details of his unspecified liver disease as well as knowing what his baseline LDL is  2   Hypertension-he did not take his blood pressure medicines today and only took 1 of them yesterday  His pressure is normally controlled  We would like to see him in the future for prevention of cardiovascular disease  Thank you so much, please not hesitate to contact me if there any questions or concerns        Macy Rodriguez MD  3/16/2018  3:00 PM

## 2018-03-16 NOTE — LETTER
2018     Rohit Wharton MD  [address]  [place]  [address] [facility] [address]    Patient: Gary Carroll   YOB: 1955   Date of Visit: 3/16/2018       Dear Dr Neel Mcintyre:    Thank you for referring Noah Hunt to me for evaluation  Below are my notes for this consultation  If you have questions, please do not hesitate to call me  I look forward to following your patient along with you  Sincerely,        Violetta Salas MD        CC: ELIZABETH Pandey MD  3/16/2018  3:56 PM  Sign at close encounter  [address] Cardiology [facility] [place] [phone]    Cardiology Follow up    Patient:  Gary Carroll  :  1955  MRN:  [id number]    History of Present Illness:     79-year-old man with past medical history of hypertension and unspecified liver disease presents for preoperative cardiovascular evaluation prior to a 5 8 cm abdominal aortic aneurysm repair  He notes no exertional chest pain but did get an episode of back pain in the lower back last week-the pain radiated to his right chest and he noted significant hypertension because the pain was so significant  He went to the emergency room and was sent home  He notes no exertional shortness of breath and further denies any palpitations or syncope  He had again an episode of dizziness not too long ago  Patient Active Problem List   Diagnosis    AAA (abdominal aortic aneurysm) without rupture [facility])       Past Surgical History  Past Surgical History:   Procedure Laterality Date    HERNIA REPAIR         Social History   Social History     Social History    Marital status: Single     Spouse name: N/A    Number of children: N/A    Years of education: N/A     Occupational History    Not on file       Social History Main Topics    Smoking status: Former Smoker     Quit date:     Smokeless tobacco: Never Used    Alcohol use Yes      Comment: Ocassional    Drug use: No    Sexual activity: Not on file     Other Topics Concern    Not on file     Social History Narrative    No narrative on file        No Known Allergies    Family History   Family History   Problem Relation Age of Onset    Pancreatic cancer Mother     Diabetes Mother     Cancer Father     Diabetes Father        Review of Systems:  Review of Systems   Constitutional: Negative for chills, fatigue and fever  HENT: Negative for hearing loss and trouble swallowing  Eyes: Negative for pain  Respiratory: Negative for cough, chest tightness and shortness of breath  Cardiovascular: Positive for leg swelling  Negative for chest pain and palpitations  Gastrointestinal: Negative for abdominal pain, blood in stool, nausea and vomiting  Endocrine: Negative for cold intolerance and heat intolerance  Genitourinary: Negative for difficulty urinating, frequency and hematuria  Musculoskeletal: Negative for arthralgias and neck pain  Skin: Negative for rash  Allergic/Immunologic: Negative for environmental allergies  Neurological: Negative for dizziness, weakness and headaches  Hematological: Does not bruise/bleed easily  Psychiatric/Behavioral: Negative for decreased concentration and sleep disturbance  The patient is not nervous/anxious  Current Outpatient Prescriptions:     hydrochlorothiazide (HYDRODIURIL) 25 mg tablet, Take by mouth As needed, Disp: , Rfl:     lisinopril (ZESTRIL) 20 mg tablet, Take by mouth, Disp: , Rfl:     zolpidem (AMBIEN) 10 mg tablet, Take by mouth, Disp: , Rfl:      Physical Exam:    There were no vitals filed for this visit  Physical Exam   Constitutional: He is oriented to person, place, and time  He appears well-developed and well-nourished  HENT:   Head: Normocephalic  Right Ear: External ear normal    Left Ear: External ear normal    Mouth/Throat: Oropharynx is clear and moist    Eyes: Pupils are equal, round, and reactive to light  Neck: No JVD present  Carotid bruit is not present  Cardiovascular: Normal rate, regular rhythm and intact distal pulses  Exam reveals no gallop and no friction rub  No murmur heard  Pulmonary/Chest: Effort normal and breath sounds normal  No tachypnea  No respiratory distress  He has no wheezes  He has no rales  He exhibits no tenderness  Abdominal: Soft  He exhibits no distension  There is no tenderness  There is no rebound and no guarding  Musculoskeletal: He exhibits no edema  Neurological: He is alert and oriented to person, place, and time  Skin: Skin is warm and dry  Psychiatric: He has a normal mood and affect  His behavior is normal  Judgment and thought content normal    Nursing note and vitals reviewed  Labs:not applicable    Assessment/Plan:    1  Preoperative evaluation prior to abdominal aortic aneurysm repair-he has negative stress test from a few months ago and has no symptoms concerning for acute coronary syndrome or stable angina  At this time he is acceptable risk for repair of his abdominal aortic aneurysm  He may proceed to the operating room without further testing  We can consider adding a statin Lipitor 40 mg nightly during the perioperative period as this at the potential to reduce cardiovascular events perioperatively  I am hesitant to add this at the moment without further details of his unspecified liver disease as well as knowing what his baseline LDL is  2   Hypertension-he did not take his blood pressure medicines today and only took 1 of them yesterday  His pressure is normally controlled  We would like to see him in the future for prevention of cardiovascular disease  Thank you so much, please not hesitate to contact me if there any questions or concerns        Richard Henning MD  3/16/2018  3:00 PM

## 2018-04-04 ENCOUNTER — OFFICE VISIT (OUTPATIENT)
Dept: VASCULAR SURGERY | Facility: CLINIC | Age: 63
End: 2018-04-04
Payer: COMMERCIAL

## 2018-04-04 VITALS
SYSTOLIC BLOOD PRESSURE: 140 MMHG | WEIGHT: 254 LBS | DIASTOLIC BLOOD PRESSURE: 90 MMHG | BODY MASS INDEX: 36.36 KG/M2 | HEART RATE: 100 BPM | RESPIRATION RATE: 20 BRPM | TEMPERATURE: 98.1 F | HEIGHT: 70 IN

## 2018-04-04 DIAGNOSIS — I71.4 AAA (ABDOMINAL AORTIC ANEURYSM) WITHOUT RUPTURE (HCC): Primary | Chronic | ICD-10-CM

## 2018-04-04 PROCEDURE — 99215 OFFICE O/P EST HI 40 MIN: CPT | Performed by: SURGERY

## 2018-04-04 NOTE — PROGRESS NOTES
Assessment/Plan:    AAA (abdominal aortic aneurysm) without rupture ([facility])  5 8-6 0 cm infrarenal abdominal aortic aneurysm  The anatomy based on recent CT angiogram is amenable to endovascular repair  We discussed this option, the details of the procedure, the associated risks and benefits and the expected recovery  We will plan on proceeding in the near future  Diagnoses and all orders for this visit:    AAA (abdominal aortic aneurysm) without rupture [facility])  -     Case request operating room: REPAIR ANEURYSM ENDOVASCULAR ABDOMINAL AORTIC  (EVAR); Standing  -     Type and screen; Future  -     Basic metabolic panel; Future  -     CBC and Platelet; Future  -     Protime-INR; Future  -     EKG 12 lead; Future  -     XR chest pa & lateral; Future  -     Case request operating room: REPAIR ANEURYSM ENDOVASCULAR ABDOMINAL AORTIC  (EVAR)    Other orders  -     Diet NPO; Sips with meds; Standing  -     Void on call to OR; Standing  -     Insert peripheral IV; Standing  -     Place sequential compression device; Standing  -     ceFAZolin (ANCEF) IVPB (premix) 2,000 mg; Infuse 2,000 mg into a venous catheter once           Subjective:      Patient ID: Erik Adams is a 58 y o  male  Patient had CTA abd/pelvis on 3/5  Patient has known AAA and has had cardiac clearance  Patient has back pain and abdominal pain and was seen in the ER on 3/7  He has post prandial pain  Patient denies appetite changes  He denies tobacco use  54-year-old with known abdominal aortic aneurysm presents in follow-up after recent CT angiogram   This does confirm a 5 8-6 cm infrarenal abdominal aortic aneurysm  The anatomy is amenable to endovascular repair  He currently denies any abdominal or back pain  He did have an episode of back pain radiating to his anterior chest approximately 3 days following his CT scan    He was evaluated in the emergency room at which time workup was performed to include a chest CT for aortic dissection which was negative and also confirmed no change in his aortic aneurysm  This discomfort ultimately resolved and was felt to be of a musculoskeletal origin  The following portions of the patient's history were reviewed and updated as appropriate: allergies, current medications, past family history, past medical history, past social history, past surgical history and problem list     Review of Systems   Constitutional: Negative  HENT: Negative  Eyes: Negative  Respiratory: Negative  Cardiovascular: Positive for leg swelling  Gastrointestinal: Positive for abdominal pain  Endocrine: Negative  Genitourinary: Negative  Musculoskeletal: Positive for back pain  Skin: Negative  Allergic/Immunologic: Negative  Neurological: Negative  Hematological: Negative  Psychiatric/Behavioral: Negative  Objective:      /90 (BP Location: Right arm, Patient Position: Sitting, Cuff Size: Adult)   Pulse 100   Temp 98 1 °F (36 7 °C) (Tympanic)   Resp 20   Ht 5' 10" (1 778 m)   Wt 115 kg (254 lb)   BMI 36 45 kg/m²          Physical Exam     Physical Exam     There is no change in physical exam as compared to previous office visit 03/01/2018  Constitutional: He is oriented to person, place, and time  He appears well-developed and well-nourished  HENT:   Head: Normocephalic and atraumatic  Eyes: Conjunctivae and EOM are normal    Neck: Normal range of motion  Neck supple  Cardiovascular: Normal rate, regular rhythm, S1 normal, S2 normal and normal heart sounds  No murmur heard  Pulses:       Carotid pulses are 2+ on the right side, and 2+ on the left side  Radial pulses are 2+ on the right side, and 2+ on the left side  Femoral pulses are 2+ on the right side, and 2+ on the left side  Popliteal pulses are 2+ on the right side, and 2+ on the left side  Posterior tibial pulses are 2+ on the right side, and 2+ on the left side  Pulmonary/Chest: Effort normal and breath sounds normal    Abdominal: Soft  He exhibits pulsatile midline mass  He exhibits no abdominal bruit  There is no tenderness  No hernia  Musculoskeletal: Normal range of motion  He exhibits no edema, tenderness or deformity  Neurological: He is alert and oriented to person, place, and time  No cranial nerve deficit  Skin: Skin is warm, dry and intact  Psychiatric: He has a normal mood and affect      Operative Scheduling Information:    Hospital[facility]    Physician:  Angelita Ibanez    Surgery:  Endovascular aneurysm repair    Urgency:  Standard    Case Length:  Normal    Post-op Bed:  Stepdown    OR Table:  Discovery    Equipment Needs:  Rep:  Cynthia Lucero    Medication Instructions:      Hydration:  No

## 2018-04-04 NOTE — LETTER
April 4, 2018     Joon Agnesjane, [address]  [address]  [facility] [address]    Patient: Mary Kate Mejia   YOB: 1955   Date of Visit: 4/4/2018       Dear Dr Tereso Lyons:    Thank you for referring Dave Boone to me for evaluation  Below are the relevant portions of my assessment and plan of care  AAA (abdominal aortic aneurysm) without rupture (HCC)  5 8-6 0 cm infrarenal abdominal aortic aneurysm  The anatomy based on recent CT angiogram is amenable to endovascular repair  We discussed this option, the details of the procedure, the associated risks and benefits and the expected recovery  We will plan on proceeding in the near future  If you have questions, please do not hesitate to call me  I look forward to following Facundo Bailon along with you           Sincerely,        Colleen Dempsey MD        CC: No Recipients

## 2018-04-04 NOTE — PATIENT INSTRUCTIONS
AAA (abdominal aortic aneurysm) without rupture (HCC)  5 8-6 0 cm infrarenal abdominal aortic aneurysm  The anatomy based on recent CT angiogram is amenable to endovascular repair  We discussed this option, the details of the procedure, the associated risks and benefits and the expected recovery  We will plan on proceeding in the near future

## 2018-04-04 NOTE — ASSESSMENT & PLAN NOTE
5 8-6 0 cm infrarenal abdominal aortic aneurysm  The anatomy based on recent CT angiogram is amenable to endovascular repair  We discussed this option, the details of the procedure, the associated risks and benefits and the expected recovery  We will plan on proceeding in the near future

## 2018-04-04 NOTE — H&P
Assessment/Plan:    No problem-specific Assessment & Plan notes found for this encounter  Diagnoses and all orders for this visit:    AAA (abdominal aortic aneurysm) without rupture ([facility] [address] )          Subjective:      Patient ID: Herb Vega is a 58 y o  male  Patient had CTA abd/pelvis on 3/5  Patient has known AAA and has had cardiac clearance  Patient has back pain and abdominal pain and was seen in the ER on 3/7  He has post prandial pain  Patient denies appetite changes  He denies tobacco use  62-year-old with known abdominal aortic aneurysm presents in follow-up after recent CT angiogram   This does confirm a 5 8-6 cm infrarenal abdominal aortic aneurysm  The anatomy is amenable to endovascular repair  He currently denies any abdominal or back pain  He did have an episode of back pain radiating to his anterior chest approximately 3 days following his CT scan  He was evaluated in the emergency room at which time workup was performed to include a chest CT for aortic dissection which was negative and also confirmed no change in his aortic aneurysm  This discomfort ultimately resolved and was felt to be of a musculoskeletal origin  The following portions of the patient's history were reviewed and updated as appropriate: allergies, current medications, past family history, past medical history, past social history, past surgical history and problem list     Review of Systems   Constitutional: Negative  HENT: Negative  Eyes: Negative  Respiratory: Negative  Cardiovascular: Positive for leg swelling  Gastrointestinal: Positive for abdominal pain  Endocrine: Negative  Genitourinary: Negative  Musculoskeletal: Positive for back pain  Skin: Negative  Allergic/Immunologic: Negative  Neurological: Negative  Hematological: Negative  Psychiatric/Behavioral: Negative  Objective: There were no vitals taken for this visit           Physical Exam Physical Exam     There is no change in physical exam as compared to previous office visit 03/01/2018  Constitutional: He is oriented to person, place, and time  He appears well-developed and well-nourished  HENT:   Head: Normocephalic and atraumatic  Eyes: Conjunctivae and EOM are normal    Neck: Normal range of motion  Neck supple  Cardiovascular: Normal rate, regular rhythm, S1 normal, S2 normal and normal heart sounds  No murmur heard  Pulses:       Carotid pulses are 2+ on the right side, and 2+ on the left side  Radial pulses are 2+ on the right side, and 2+ on the left side  Femoral pulses are 2+ on the right side, and 2+ on the left side  Popliteal pulses are 2+ on the right side, and 2+ on the left side  Posterior tibial pulses are 2+ on the right side, and 2+ on the left side  Pulmonary/Chest: Effort normal and breath sounds normal    Abdominal: Soft  He exhibits pulsatile midline mass  He exhibits no abdominal bruit  There is no tenderness  No hernia  Musculoskeletal: Normal range of motion  He exhibits no edema, tenderness or deformity  Neurological: He is alert and oriented to person, place, and time  No cranial nerve deficit  Skin: Skin is warm, dry and intact  Psychiatric: He has a normal mood and affect      Operative Scheduling Information:    Hospital:  [facility]    Physician:  [address]    Surgery:  Endovascular aneurysm repair    Urgency:  Standard    Case Length:  Normal    Post-op Bed:  Stepdown    OR Table:  Discovery    Equipment Needs:  Rep:  Adarsh Montana    Medication Instructions:      Hydration:  No

## 2018-04-05 ENCOUNTER — TELEPHONE (OUTPATIENT)
Dept: VASCULAR SURGERY | Facility: CLINIC | Age: 63
End: 2018-04-05

## 2018-04-06 NOTE — TELEPHONE ENCOUNTER
Pt returned my call  We confirmed date of surgery for 4/23/18 at [facility] with Dr Carmela Johnson  Pt will go for pre-op testing  All other instructions reviewed

## 2018-04-09 ENCOUNTER — APPOINTMENT (OUTPATIENT)
Dept: RADIOLOGY | Facility: CLINIC | Age: 63
End: 2018-04-09
Payer: COMMERCIAL

## 2018-04-09 ENCOUNTER — APPOINTMENT (OUTPATIENT)
Dept: LAB | Facility: CLINIC | Age: 63
End: 2018-04-09
Payer: COMMERCIAL

## 2018-04-09 ENCOUNTER — HOSPITAL ENCOUNTER (OUTPATIENT)
Dept: NON INVASIVE DIAGNOSTICS | Facility: CLINIC | Age: 63
Discharge: HOME | End: 2018-04-09
Payer: COMMERCIAL

## 2018-04-09 DIAGNOSIS — I71.4 AAA (ABDOMINAL AORTIC ANEURYSM) WITHOUT RUPTURE (HCC): Chronic | ICD-10-CM

## 2018-04-09 LAB
ABO GROUP BLD: NORMAL
ANION GAP SERPL CALCULATED.3IONS-SCNC: 7 MMOL/L (ref 4–13)
ATRIAL RATE: 67 BPM
BLD GP AB SCN SERPL QL: NEGATIVE
BUN SERPL-MCNC: 17 MG/DL (ref 5–25)
CALCIUM SERPL-MCNC: 8.4 MG/DL
CHLORIDE SERPL-SCNC: 108 MMOL/L (ref 100–108)
CO2 SERPL-SCNC: 22 MMOL/L (ref 21–32)
CREAT SERPL-MCNC: 1.07 MG/DL (ref 0.6–1.3)
ERYTHROCYTE [DISTWIDTH] IN BLOOD BY AUTOMATED COUNT: 14.5 % (ref 11.6–15.1)
GFR SERPL CREATININE-BSD FRML MDRD: 73 ML/MIN/1.73SQ M
GLUCOSE P FAST SERPL-MCNC: 137 MG/DL (ref 65–99)
HCT VFR BLD AUTO: 44.1 % (ref 36.5–49.3)
HGB BLD-MCNC: 15 G/DL (ref 12–17)
INR PPP: 1.2 (ref 0.86–1.16)
MCH RBC QN AUTO: 32.1 PG (ref 26.8–34.3)
MCHC RBC AUTO-ENTMCNC: 34 G/DL (ref 31.4–37.4)
MCV RBC AUTO: 94 FL (ref 82–98)
P AXIS: -8 DEGREES
PLATELET # BLD AUTO: 107 THOUSANDS/UL (ref 149–390)
PMV BLD AUTO: 10.2 FL (ref 8.9–12.7)
POTASSIUM SERPL-SCNC: 3.8 MMOL/L (ref 3.5–5.3)
PR INTERVAL: 188 MS
PROTHROMBIN TIME: 15.3 SECONDS (ref 12.1–14.4)
QRS AXIS: -2 DEGREES
QRSD INTERVAL: 94 MS
QT INTERVAL: 420 MS
QTC INTERVAL: 443 MS
RBC # BLD AUTO: 4.68 MILLION/UL (ref 3.88–5.62)
RH BLD: NEGATIVE
SODIUM SERPL-SCNC: 137 MMOL/L (ref 136–145)
SPECIMEN EXPIRATION DATE: NORMAL
T WAVE AXIS: 51 DEGREES
VENTRICULAR RATE: 67 BPM
WBC # BLD AUTO: 7.42 THOUSAND/UL (ref 4.31–10.16)

## 2018-04-09 PROCEDURE — 93010 ELECTROCARDIOGRAM REPORT: CPT | Performed by: INTERNAL MEDICINE

## 2018-04-09 PROCEDURE — 86850 RBC ANTIBODY SCREEN: CPT

## 2018-04-09 PROCEDURE — 93005 ELECTROCARDIOGRAM TRACING: CPT

## 2018-04-09 PROCEDURE — 71046 X-RAY EXAM CHEST 2 VIEWS: CPT

## 2018-04-09 PROCEDURE — 85610 PROTHROMBIN TIME: CPT

## 2018-04-09 PROCEDURE — 86901 BLOOD TYPING SEROLOGIC RH(D): CPT

## 2018-04-09 PROCEDURE — 80048 BASIC METABOLIC PNL TOTAL CA: CPT

## 2018-04-09 PROCEDURE — 86900 BLOOD TYPING SEROLOGIC ABO: CPT

## 2018-04-09 PROCEDURE — 36415 COLL VENOUS BLD VENIPUNCTURE: CPT

## 2018-04-09 PROCEDURE — 85027 COMPLETE CBC AUTOMATED: CPT

## 2018-04-10 PROCEDURE — 93351 STRESS TTE COMPLETE: CPT | Performed by: INTERNAL MEDICINE

## 2018-04-12 RX ORDER — MULTIVITAMIN
1 CAPSULE ORAL DAILY
COMMUNITY

## 2018-04-12 NOTE — PRE-PROCEDURE INSTRUCTIONS
Pre-Surgery Instructions:   Medication Instructions    hydrochlorothiazide (HYDRODIURIL) 25 mg tablet Instructed patient per Anesthesia Guidelines   lisinopril (ZESTRIL) 20 mg tablet Instructed patient per Anesthesia Guidelines   Multiple Vitamin (MULTIVITAMIN) capsule Instructed patient per Anesthesia Guidelines   zolpidem (AMBIEN) 10 mg tablet Instructed patient per Anesthesia Guidelines  WILL TAKE NO MEDS AM SURG  INSTR ON [place] CALL,  ASC LOC , BRING PHOTO ID/MED LIST/INS  INFO ,SHOWER REV , STOP ASA/NSAID/VIT 7 DAY PREOP    Education Index     Med Instructions Troubleshoot   ACE/ARB Med Class     Do not take this medication the day before and the morning of the day of surgery/procedure  Diuretic Med Class     Continue this medication up to the evening before surgery/procedure, but do not take the morning of the day of surgery  Zolpidem Med Class     Continue this medication up to the evening before surgery/procedure, but do not take the morning of the day of surgery

## 2018-04-13 ENCOUNTER — PREP FOR PROCEDURE (OUTPATIENT)
Dept: VASCULAR SURGERY | Facility: CLINIC | Age: 63
End: 2018-04-13

## 2018-04-13 ENCOUNTER — TELEPHONE (OUTPATIENT)
Dept: VASCULAR SURGERY | Facility: CLINIC | Age: 63
End: 2018-04-13

## 2018-04-13 DIAGNOSIS — I71.4 ABDOMINAL AORTIC ANEURYSM (AAA) WITHOUT RUPTURE (HCC): Primary | ICD-10-CM

## 2018-04-13 NOTE — TELEPHONE ENCOUNTER
Rec call from preadmission testing requesting patient get A1C  Had [facility] our RN look at the patients labs and glucose is elevated and confirms A1C  LMOM for patient to go for lab and put the script in the mail

## 2018-04-17 ENCOUNTER — APPOINTMENT (OUTPATIENT)
Dept: LAB | Facility: CLINIC | Age: 63
End: 2018-04-17
Payer: COMMERCIAL

## 2018-04-17 DIAGNOSIS — I71.4 ABDOMINAL AORTIC ANEURYSM (AAA) WITHOUT RUPTURE (HCC): ICD-10-CM

## 2018-04-17 LAB
EST. AVERAGE GLUCOSE BLD GHB EST-MCNC: 128 MG/DL
HBA1C MFR BLD: 6.1 % (ref 4.2–6.3)

## 2018-04-17 PROCEDURE — 83036 HEMOGLOBIN GLYCOSYLATED A1C: CPT

## 2018-04-17 PROCEDURE — 36415 COLL VENOUS BLD VENIPUNCTURE: CPT

## 2018-04-22 ENCOUNTER — ANESTHESIA EVENT (OUTPATIENT)
Dept: PERIOP | Facility: HOSPITAL | Age: 63
DRG: 269 | End: 2018-04-22
Payer: COMMERCIAL

## 2018-04-22 NOTE — ANESTHESIA PREPROCEDURE EVALUATION
Review of Systems/Medical History  Patient summary reviewed  Chart reviewed      Cardiovascular  EKG reviewed, Hypertension on > 1 medication, Aortic disease (5 8-6 cm infrarenal AAA),   Comment: Oct 2017:     SUMMARY:  -  Stress results: Duration of exercise was 6 min and 36 sec  Target heart rate was achieved  There was resting hypertension with a hypertensive blood pressure response to stress  There was no chest pain during stress  -  ECG conclusions: The stress ECG was negative for ischemia      IMPRESSIONS: Normal study after maximal exercise  No ECG or symptomatic evidence of ischemia to a HR of 142=90% MPHR      + cardiac clearance    EKG NSR,  Pulmonary    Comment: Quit smoking 1990     GI/Hepatic    Liver disease (unspecified liver disease), ,             Endo/Other    Obesity  morbid obesity   GYN       Hematology    Thrombocytopenia (107K),    Musculoskeletal       Neurology   Psychology           Physical Exam    Airway    Mallampati score: II         Dental   No notable dental hx     Cardiovascular      Pulmonary      Other Findings        Anesthesia Plan  ASA Score- 3     Anesthesia Type- general with ASA Monitors  Additional Monitors:   Airway Plan: ETT  Comment: I, Dr Ronie Collet, the attending physician, have personally seen and evaluated the patient prior to anesthetic care  I have reviewed the pre-anesthetic record, and other medical records if appropriate to the anesthetic care  If a CRNA is involved in the case, I have reviewed the CRNA assessment, if present, and agree  The patient is in a suitable condition to proceed with my formulated anesthetic plan        Plan Factors-    Induction- intravenous  Postoperative Plan-     Informed Consent- Anesthetic plan and risks discussed with patient  I personally reviewed this patient with the CRNA  Discussed and agreed on the Anesthesia Plan with the CRNA  Donta Clark

## 2018-04-23 ENCOUNTER — APPOINTMENT (OUTPATIENT)
Dept: RADIOLOGY | Facility: HOSPITAL | Age: 63
DRG: 269 | End: 2018-04-23
Attending: SURGERY
Payer: COMMERCIAL

## 2018-04-23 ENCOUNTER — HOSPITAL ENCOUNTER (INPATIENT)
Facility: HOSPITAL | Age: 63
LOS: 1 days | Discharge: HOME | DRG: 269 | End: 2018-04-24
Attending: SURGERY | Admitting: SURGERY
Payer: COMMERCIAL

## 2018-04-23 ENCOUNTER — ANESTHESIA (OUTPATIENT)
Dept: PERIOP | Facility: HOSPITAL | Age: 63
DRG: 269 | End: 2018-04-23
Payer: COMMERCIAL

## 2018-04-23 DIAGNOSIS — I71.4 AAA (ABDOMINAL AORTIC ANEURYSM) WITHOUT RUPTURE (HCC): ICD-10-CM

## 2018-04-23 LAB
GLUCOSE SERPL-MCNC: 107 MG/DL (ref 65–140)
KCT BLD-ACNC: 149 SEC (ref 89–137)
KCT BLD-ACNC: 189 SEC (ref 89–137)
KCT BLD-ACNC: 212 SEC (ref 89–137)
PLATELET # BLD AUTO: 67 THOUSANDS/UL (ref 149–390)
PMV BLD AUTO: 9.5 FL (ref 8.9–12.7)
SPECIMEN SOURCE: ABNORMAL

## 2018-04-23 PROCEDURE — 34705 EVAC RPR A-BIILIAC NDGFT: CPT | Performed by: SURGERY

## 2018-04-23 PROCEDURE — 04V03DZ RESTRICTION OF ABDOMINAL AORTA WITH INTRALUMINAL DEVICE, PERCUTANEOUS APPROACH: ICD-10-PCS | Performed by: SURGERY

## 2018-04-23 PROCEDURE — C1894 INTRO/SHEATH, NON-LASER: HCPCS | Performed by: SURGERY

## 2018-04-23 PROCEDURE — 93005 ELECTROCARDIOGRAM TRACING: CPT | Performed by: NURSE ANESTHETIST, CERTIFIED REGISTERED

## 2018-04-23 PROCEDURE — 82948 REAGENT STRIP/BLOOD GLUCOSE: CPT

## 2018-04-23 PROCEDURE — C1887 CATHETER, GUIDING: HCPCS | Performed by: SURGERY

## 2018-04-23 PROCEDURE — 85049 AUTOMATED PLATELET COUNT: CPT | Performed by: SURGERY

## 2018-04-23 PROCEDURE — C1874 STENT, COATED/COV W/DEL SYS: HCPCS | Performed by: SURGERY

## 2018-04-23 PROCEDURE — C1769 GUIDE WIRE: HCPCS | Performed by: SURGERY

## 2018-04-23 PROCEDURE — 34705 EVAC RPR A-BIILIAC NDGFT: CPT | Performed by: RADIOLOGY

## 2018-04-23 PROCEDURE — C2628 CATHETER, OCCLUSION: HCPCS | Performed by: SURGERY

## 2018-04-23 PROCEDURE — C1760 CLOSURE DEV, VASC: HCPCS | Performed by: SURGERY

## 2018-04-23 PROCEDURE — 85347 COAGULATION TIME ACTIVATED: CPT

## 2018-04-23 DEVICE — IMPLANTABLE DEVICE: Type: IMPLANTABLE DEVICE | Site: AORTA | Status: FUNCTIONAL

## 2018-04-23 DEVICE — IMPLANTABLE DEVICE: Type: IMPLANTABLE DEVICE | Site: ARTERIAL | Status: FUNCTIONAL

## 2018-04-23 DEVICE — PERCLOSE PROGLIDE™ SUTURE-MEDIATED CLOSURE SYSTEM
Type: IMPLANTABLE DEVICE | Site: ARTERIAL | Status: FUNCTIONAL
Brand: PERCLOSE PROGLIDE™

## 2018-04-23 RX ORDER — ESMOLOL HYDROCHLORIDE 10 MG/ML
INJECTION INTRAVENOUS AS NEEDED
Status: DISCONTINUED | OUTPATIENT
Start: 2018-04-23 | End: 2018-04-23 | Stop reason: SURG

## 2018-04-23 RX ORDER — ROCURONIUM BROMIDE 10 MG/ML
INJECTION, SOLUTION INTRAVENOUS AS NEEDED
Status: DISCONTINUED | OUTPATIENT
Start: 2018-04-23 | End: 2018-04-23 | Stop reason: SURG

## 2018-04-23 RX ORDER — ONDANSETRON 2 MG/ML
4 INJECTION INTRAMUSCULAR; INTRAVENOUS ONCE AS NEEDED
Status: DISCONTINUED | OUTPATIENT
Start: 2018-04-23 | End: 2018-04-23 | Stop reason: HOSPADM

## 2018-04-23 RX ORDER — METOCLOPRAMIDE HYDROCHLORIDE 5 MG/ML
INJECTION INTRAMUSCULAR; INTRAVENOUS AS NEEDED
Status: DISCONTINUED | OUTPATIENT
Start: 2018-04-23 | End: 2018-04-23 | Stop reason: SURG

## 2018-04-23 RX ORDER — SODIUM CHLORIDE 9 MG/ML
75 INJECTION, SOLUTION INTRAVENOUS CONTINUOUS
Status: DISCONTINUED | OUTPATIENT
Start: 2018-04-23 | End: 2018-04-24

## 2018-04-23 RX ORDER — PROPOFOL 10 MG/ML
INJECTION, EMULSION INTRAVENOUS AS NEEDED
Status: DISCONTINUED | OUTPATIENT
Start: 2018-04-23 | End: 2018-04-23 | Stop reason: SURG

## 2018-04-23 RX ORDER — FENTANYL CITRATE 50 UG/ML
INJECTION, SOLUTION INTRAMUSCULAR; INTRAVENOUS AS NEEDED
Status: DISCONTINUED | OUTPATIENT
Start: 2018-04-23 | End: 2018-04-23 | Stop reason: SURG

## 2018-04-23 RX ORDER — SODIUM CHLORIDE, SODIUM LACTATE, POTASSIUM CHLORIDE, CALCIUM CHLORIDE 600; 310; 30; 20 MG/100ML; MG/100ML; MG/100ML; MG/100ML
INJECTION, SOLUTION INTRAVENOUS CONTINUOUS PRN
Status: DISCONTINUED | OUTPATIENT
Start: 2018-04-23 | End: 2018-04-23

## 2018-04-23 RX ORDER — GLYCOPYRROLATE 0.2 MG/ML
INJECTION INTRAMUSCULAR; INTRAVENOUS AS NEEDED
Status: DISCONTINUED | OUTPATIENT
Start: 2018-04-23 | End: 2018-04-23 | Stop reason: SURG

## 2018-04-23 RX ORDER — LIDOCAINE HYDROCHLORIDE 10 MG/ML
INJECTION, SOLUTION INFILTRATION; PERINEURAL AS NEEDED
Status: DISCONTINUED | OUTPATIENT
Start: 2018-04-23 | End: 2018-04-23 | Stop reason: SURG

## 2018-04-23 RX ORDER — METOPROLOL TARTRATE 5 MG/5ML
5 INJECTION INTRAVENOUS EVERY 8 HOURS PRN
Status: DISCONTINUED | OUTPATIENT
Start: 2018-04-23 | End: 2018-04-24 | Stop reason: HOSPADM

## 2018-04-23 RX ORDER — CHLORHEXIDINE GLUCONATE 0.12 MG/ML
15 RINSE ORAL EVERY 12 HOURS SCHEDULED
Status: DISCONTINUED | OUTPATIENT
Start: 2018-04-23 | End: 2018-04-23 | Stop reason: HOSPADM

## 2018-04-23 RX ORDER — SODIUM CHLORIDE 9 MG/ML
INJECTION, SOLUTION INTRAVENOUS CONTINUOUS PRN
Status: DISCONTINUED | OUTPATIENT
Start: 2018-04-23 | End: 2018-04-23 | Stop reason: SURG

## 2018-04-23 RX ORDER — SODIUM CHLORIDE, SODIUM LACTATE, POTASSIUM CHLORIDE, CALCIUM CHLORIDE 600; 310; 30; 20 MG/100ML; MG/100ML; MG/100ML; MG/100ML
125 INJECTION, SOLUTION INTRAVENOUS CONTINUOUS
Status: DISCONTINUED | OUTPATIENT
Start: 2018-04-23 | End: 2018-04-23

## 2018-04-23 RX ORDER — HYDROCODONE BITARTRATE AND ACETAMINOPHEN 5; 325 MG/1; MG/1
1 TABLET ORAL EVERY 6 HOURS PRN
Status: DISCONTINUED | OUTPATIENT
Start: 2018-04-23 | End: 2018-04-24 | Stop reason: HOSPADM

## 2018-04-23 RX ORDER — FENTANYL CITRATE/PF 50 MCG/ML
25 SYRINGE (ML) INJECTION
Status: DISCONTINUED | OUTPATIENT
Start: 2018-04-23 | End: 2018-04-23 | Stop reason: HOSPADM

## 2018-04-23 RX ORDER — ONDANSETRON 2 MG/ML
INJECTION INTRAMUSCULAR; INTRAVENOUS AS NEEDED
Status: DISCONTINUED | OUTPATIENT
Start: 2018-04-23 | End: 2018-04-23 | Stop reason: SURG

## 2018-04-23 RX ORDER — HEPARIN SODIUM 1000 [USP'U]/ML
INJECTION, SOLUTION INTRAVENOUS; SUBCUTANEOUS AS NEEDED
Status: DISCONTINUED | OUTPATIENT
Start: 2018-04-23 | End: 2018-04-23 | Stop reason: SURG

## 2018-04-23 RX ORDER — PROTAMINE SULFATE 10 MG/ML
INJECTION, SOLUTION INTRAVENOUS AS NEEDED
Status: DISCONTINUED | OUTPATIENT
Start: 2018-04-23 | End: 2018-04-23 | Stop reason: SURG

## 2018-04-23 RX ORDER — MIDAZOLAM HYDROCHLORIDE 1 MG/ML
INJECTION INTRAMUSCULAR; INTRAVENOUS AS NEEDED
Status: DISCONTINUED | OUTPATIENT
Start: 2018-04-23 | End: 2018-04-23 | Stop reason: SURG

## 2018-04-23 RX ADMIN — ONDANSETRON 4 MG: 2 INJECTION INTRAMUSCULAR; INTRAVENOUS at 09:01

## 2018-04-23 RX ADMIN — FENTANYL CITRATE 25 MCG: 50 INJECTION, SOLUTION INTRAMUSCULAR; INTRAVENOUS at 11:06

## 2018-04-23 RX ADMIN — PROPOFOL 200 MG: 10 INJECTION, EMULSION INTRAVENOUS at 07:47

## 2018-04-23 RX ADMIN — SODIUM CHLORIDE 3 MG/HR: 0.9 INJECTION, SOLUTION INTRAVENOUS at 08:09

## 2018-04-23 RX ADMIN — METOROPROLOL TARTRATE 5 MG: 5 INJECTION, SOLUTION INTRAVENOUS at 15:08

## 2018-04-23 RX ADMIN — GLYCOPYRROLATE 0.6 MG: 0.2 INJECTION, SOLUTION INTRAMUSCULAR; INTRAVENOUS at 09:45

## 2018-04-23 RX ADMIN — NEOSTIGMINE METHYLSULFATE 3.5 MG: 1 INJECTION, SOLUTION INTRAMUSCULAR; INTRAVENOUS; SUBCUTANEOUS at 09:45

## 2018-04-23 RX ADMIN — METOCLOPRAMIDE 10 MG: 5 INJECTION, SOLUTION INTRAMUSCULAR; INTRAVENOUS at 08:15

## 2018-04-23 RX ADMIN — SODIUM CHLORIDE: 0.9 INJECTION, SOLUTION INTRAVENOUS at 08:05

## 2018-04-23 RX ADMIN — ROCURONIUM BROMIDE 40 MG: 10 INJECTION INTRAVENOUS at 07:49

## 2018-04-23 RX ADMIN — FENTANYL CITRATE 100 MCG: 50 INJECTION, SOLUTION INTRAMUSCULAR; INTRAVENOUS at 07:47

## 2018-04-23 RX ADMIN — PROPOFOL 100 MG: 10 INJECTION, EMULSION INTRAVENOUS at 07:51

## 2018-04-23 RX ADMIN — ROCURONIUM BROMIDE 20 MG: 10 INJECTION INTRAVENOUS at 08:31

## 2018-04-23 RX ADMIN — PROPOFOL 50 MG: 10 INJECTION, EMULSION INTRAVENOUS at 07:49

## 2018-04-23 RX ADMIN — MIDAZOLAM 2 MG: 1 INJECTION INTRAMUSCULAR; INTRAVENOUS at 07:20

## 2018-04-23 RX ADMIN — CEFAZOLIN SODIUM: 1 INJECTION, POWDER, FOR SOLUTION INTRAMUSCULAR; INTRAVENOUS at 12:09

## 2018-04-23 RX ADMIN — DEXAMETHASONE SODIUM PHOSPHATE 10 MG: 10 INJECTION INTRAMUSCULAR; INTRAVENOUS at 08:12

## 2018-04-23 RX ADMIN — SODIUM CHLORIDE, SODIUM LACTATE, POTASSIUM CHLORIDE, AND CALCIUM CHLORIDE: .6; .31; .03; .02 INJECTION, SOLUTION INTRAVENOUS at 07:00

## 2018-04-23 RX ADMIN — ESMOLOL HYDROCHLORIDE 60 MG: 100 INJECTION, SOLUTION INTRAVENOUS at 10:09

## 2018-04-23 RX ADMIN — PHENYLEPHRINE HYDROCHLORIDE 40 MCG/MIN: 10 INJECTION INTRAVENOUS at 08:39

## 2018-04-23 RX ADMIN — Medication 2000 MG: at 07:45

## 2018-04-23 RX ADMIN — GLYCOPYRROLATE 0.2 MG: 0.2 INJECTION, SOLUTION INTRAMUSCULAR; INTRAVENOUS at 07:20

## 2018-04-23 RX ADMIN — ESMOLOL HYDROCHLORIDE 30 MG: 100 INJECTION, SOLUTION INTRAVENOUS at 09:45

## 2018-04-23 RX ADMIN — PROTAMINE SULFATE 40 MG: 10 INJECTION, SOLUTION INTRAVENOUS at 09:30

## 2018-04-23 RX ADMIN — HEPARIN SODIUM 6000 UNITS: 1000 INJECTION INTRAVENOUS; SUBCUTANEOUS at 08:46

## 2018-04-23 RX ADMIN — SODIUM CHLORIDE 75 ML/HR: 0.9 INJECTION, SOLUTION INTRAVENOUS at 10:41

## 2018-04-23 RX ADMIN — PROPOFOL 100 MG: 10 INJECTION, EMULSION INTRAVENOUS at 07:54

## 2018-04-23 RX ADMIN — LIDOCAINE HYDROCHLORIDE 100 MG: 10 INJECTION, SOLUTION INFILTRATION; PERINEURAL at 07:47

## 2018-04-23 RX ADMIN — CHLORHEXIDINE GLUCONATE 15 ML: 1.2 RINSE ORAL at 06:14

## 2018-04-23 RX ADMIN — SODIUM CHLORIDE, SODIUM LACTATE, POTASSIUM CHLORIDE, AND CALCIUM CHLORIDE: .6; .31; .03; .02 INJECTION, SOLUTION INTRAVENOUS at 09:00

## 2018-04-23 RX ADMIN — HEPARIN SODIUM: 1000 INJECTION, SOLUTION INTRAVENOUS; SUBCUTANEOUS at 12:09

## 2018-04-23 NOTE — H&P (VIEW-ONLY)
Assessment/Plan:    AAA (abdominal aortic aneurysm) without rupture ([facility])  5 8-6 0 cm infrarenal abdominal aortic aneurysm  The anatomy based on recent CT angiogram is amenable to endovascular repair  We discussed this option, the details of the procedure, the associated risks and benefits and the expected recovery  We will plan on proceeding in the near future  Diagnoses and all orders for this visit:    AAA (abdominal aortic aneurysm) without rupture [facility])  -     Case request operating room: REPAIR ANEURYSM ENDOVASCULAR ABDOMINAL AORTIC  (EVAR); Standing  -     Type and screen; Future  -     Basic metabolic panel; Future  -     CBC and Platelet; Future  -     Protime-INR; Future  -     EKG 12 lead; Future  -     XR chest pa & lateral; Future  -     Case request operating room: REPAIR ANEURYSM ENDOVASCULAR ABDOMINAL AORTIC  (EVAR)    Other orders  -     Diet NPO; Sips with meds; Standing  -     Void on call to OR; Standing  -     Insert peripheral IV; Standing  -     Place sequential compression device; Standing  -     ceFAZolin (ANCEF) IVPB (premix) 2,000 mg; Infuse 2,000 mg into a venous catheter once           Subjective:      Patient ID: Nathan Barrios is a 58 y o  male  Patient had CTA abd/pelvis on 3/5  Patient has known AAA and has had cardiac clearance  Patient has back pain and abdominal pain and was seen in the ER on 3/7  He has post prandial pain  Patient denies appetite changes  He denies tobacco use  69-year-old with known abdominal aortic aneurysm presents in follow-up after recent CT angiogram   This does confirm a 5 8-6 cm infrarenal abdominal aortic aneurysm  The anatomy is amenable to endovascular repair  He currently denies any abdominal or back pain  He did have an episode of back pain radiating to his anterior chest approximately 3 days following his CT scan    He was evaluated in the emergency room at which time workup was performed to include a chest CT for aortic dissection which was negative and also confirmed no change in his aortic aneurysm  This discomfort ultimately resolved and was felt to be of a musculoskeletal origin  The following portions of the patient's history were reviewed and updated as appropriate: allergies, current medications, past family history, past medical history, past social history, past surgical history and problem list     Review of Systems   Constitutional: Negative  HENT: Negative  Eyes: Negative  Respiratory: Negative  Cardiovascular: Positive for leg swelling  Gastrointestinal: Positive for abdominal pain  Endocrine: Negative  Genitourinary: Negative  Musculoskeletal: Positive for back pain  Skin: Negative  Allergic/Immunologic: Negative  Neurological: Negative  Hematological: Negative  Psychiatric/Behavioral: Negative  Objective:      /90 (BP Location: Right arm, Patient Position: Sitting, Cuff Size: Adult)   Pulse 100   Temp 98 1 °F (36 7 °C) (Tympanic)   Resp 20   Ht 5' 10" (1 778 m)   Wt 115 kg (254 lb)   BMI 36 45 kg/m²          Physical Exam     Physical Exam     There is no change in physical exam as compared to previous office visit 03/01/2018  Constitutional: He is oriented to person, place, and time  He appears well-developed and well-nourished  HENT:   Head: Normocephalic and atraumatic  Eyes: Conjunctivae and EOM are normal    Neck: Normal range of motion  Neck supple  Cardiovascular: Normal rate, regular rhythm, S1 normal, S2 normal and normal heart sounds  No murmur heard  Pulses:       Carotid pulses are 2+ on the right side, and 2+ on the left side  Radial pulses are 2+ on the right side, and 2+ on the left side  Femoral pulses are 2+ on the right side, and 2+ on the left side  Popliteal pulses are 2+ on the right side, and 2+ on the left side  Posterior tibial pulses are 2+ on the right side, and 2+ on the left side  Pulmonary/Chest: Effort normal and breath sounds normal    Abdominal: Soft  He exhibits pulsatile midline mass  He exhibits no abdominal bruit  There is no tenderness  No hernia  Musculoskeletal: Normal range of motion  He exhibits no edema, tenderness or deformity  Neurological: He is alert and oriented to person, place, and time  No cranial nerve deficit  Skin: Skin is warm, dry and intact  Psychiatric: He has a normal mood and affect      Operative Scheduling Information:    Hospital:  [facility]    Physician:  [address]    Surgery:  Endovascular aneurysm repair    Urgency:  Standard    Case Length:  Normal    Post-op Bed:  Stepdown    OR Table:  Discovery    Equipment Needs:  Rep:  Adeline Goetz    Medication Instructions:      Hydration:  No

## 2018-04-23 NOTE — OP NOTE
OPERATIVE REPORT  PATIENT NAME: Trisha Rogers    :  1955  MRN: [id number]  Pt Location: [facility] HYBRID OR ROOM 02    SURGERY DATE: 2018    Surgeon(s) and Role:     * Phuc Marie MD - Primary     * Hayden Justice MD - Assisting    AAA (abdominal aortic aneurysm) without rupture ([address] ) [I71 4]    Post-Op Diagnosis Codes:     * AAA (abdominal aortic aneurysm) without rupture ([address] ) [I71 4]      Procedure(s):  REPAIR ANEURYSM ENDOVASCULAR ABDOMINAL AORTIC  (EVAR)    Specimen(s):  * No specimens in log *    Estimated Blood Loss:   300 mL    Drains:       Anesthesia Type:   General    Operative Indications:  AAA (abdominal aortic aneurysm) without rupture ([address] ) [I71 4]      Operative Findings:  Successful deployment of bifercated EVAR using Cook Zenith 24Y225 main body via right CFA, right 20x90 and left 16x90 iliac limbs  There was late lumbar endoleak  Palpable pedal pulses at conclusion    Complications:   None    Procedure and Technique: Both a vascular surgeon and interventional radiologist were present throughout this procedures due to their unique skill sets and the necessity to simultaneously manipulate multiple endovascular devices  General anesthesia was administered  Radial A-line was placed  The abdomen and bilateral upper legs were prepped and draped in normal sterile fashion with chlorhexidine prep  The femoral head was imaged with fluoroscopy and marked bilaterally  Transverse incisions were made overlying both common femoral arteries  Ultrasound was used to identify the common femoral arteries and micropuncture kit its were utilized to cannulate both common femoral arteries under direct ultrasound guidance  The tract was dilated and pre closure was performed with 2 ProGlide closure systems in each common femoral artery  An 8 Palestinian sheath was placed in both common femoral arteries  IV heparin was administered      A pigtail catheter was passed via the left femoral sheath into the pararenal aorta  A 28x111 Cook graft was then passed via the right CFA and positioned appropriately at the pararenal aorta  A pararenal flush aortogram was performed  The renal arteries were marked  The stent graft was then partially deployed just below the lowest renal artery  A repeat pararenal A-gram was performed and the lowest renal artery was remarked  The contralateral sheath and flush catheter were withdrawn into the aortic sac and the proximal main body graft was fully deployed bellow the marked lowest renal artery  The contralateral limb was cannulated with pigtail catheter  Positioning within the main body graft was confirmed by positioning a pigtail catheter at the aortic neck and rotating it 360°  A retrograde image of the left  iliac bifurcation was obtained in an appropriate obliquity via the contralateral sheath  The internal iliac artery was marked  A 16x90 iliac limb was then passed, positioned appropriately and deployed  The delivery system was then removed  The main body graft was then fully deployed  A retrograde image was performed of the ipsilateral right iliac bifurcation in an appropriate obliquity  The iliac bifurcation was marked  A 20x90 iliac limb was then passed, positioned appropriately and deployed  The delivery system was then removed  Angioplasty was then performed with an aortic compliant balloon at all points of graft overlap and all landing points  A completion arteriogram was then performed  This arteriogram showed wide patency of endograft, bilateral renal arteries and internal iliac arteries  There was a late type II lumbar endoleak proximally and poor visualization of right iliac landing zone due to overlying bowel loop  Retrograde imaging of right iliac without endoleak  All guidewires and catheters were withdrawn and the pre closure system was used to close both common femoral artery puncture sites   A total of 4 Proglides were necessary to obtain hemostasis on the right, Manual compression was held for hemostasis  The wounds were then closed with Histacryl dressings  The patient was then woken from anesthesia and transferred to the recovery room  Bilateral pedal pulses were palpable       I was present for the entire procedure    Patient Disposition:  PACU     SIGNATURE: Abdiaziz Estrada MD  DATE: April 23, 2018  TIME: 10:12 AM

## 2018-04-23 NOTE — ANESTHESIA POSTPROCEDURE EVALUATION
Post-Op Assessment Note      CV Status:  Stable    Mental Status:  Alert and awake    Hydration Status:  Euvolemic    PONV Controlled:  Controlled    Airway Patency:  Patent  Airway: intubated    Post Op Vitals Reviewed: Yes          Staff: CRNA, Anesthesiologist      Difficult intubation letter: given to patient          /73 (04/23/18 1024)    Temp 98 °F (36 7 °C) (04/23/18 1024)    Pulse 74 (04/23/18 1024)   Resp 20 (04/23/18 1024)    SpO2 96 % (04/23/18 1024)

## 2018-04-23 NOTE — PERIOPERATIVE NURSING NOTE
Patient denies chest pain, denies chest pressure, and denies s o b  Pt  Remains hemodynamically stable at this time  Will continue to monitor

## 2018-04-23 NOTE — PERIOPERATIVE NURSING NOTE
Dr Ana Vega md, to look at patient's ekg in pacu  Pt  Denies chest pain, denies chest pressure, and denies s o b  Pt  remains hemodynamically stable at this time  No new orders  Will continue to monitor

## 2018-04-24 VITALS
SYSTOLIC BLOOD PRESSURE: 140 MMHG | HEART RATE: 70 BPM | BODY MASS INDEX: 35.79 KG/M2 | WEIGHT: 250 LBS | RESPIRATION RATE: 18 BRPM | DIASTOLIC BLOOD PRESSURE: 71 MMHG | HEIGHT: 70 IN | TEMPERATURE: 98 F | OXYGEN SATURATION: 95 %

## 2018-04-24 LAB
ANION GAP SERPL CALCULATED.3IONS-SCNC: 7 MMOL/L (ref 4–13)
ATRIAL RATE: 74 BPM
BUN SERPL-MCNC: 13 MG/DL (ref 5–25)
CALCIUM SERPL-MCNC: 7.7 MG/DL (ref 8.3–10.1)
CHLORIDE SERPL-SCNC: 109 MMOL/L (ref 100–108)
CO2 SERPL-SCNC: 22 MMOL/L (ref 21–32)
CREAT SERPL-MCNC: 0.83 MG/DL (ref 0.6–1.3)
ERYTHROCYTE [DISTWIDTH] IN BLOOD BY AUTOMATED COUNT: 14.3 % (ref 11.6–15.1)
GFR SERPL CREATININE-BSD FRML MDRD: 93 ML/MIN/1.73SQ M
GLUCOSE SERPL-MCNC: 133 MG/DL (ref 65–140)
HCT VFR BLD AUTO: 39.1 % (ref 36.5–49.3)
HGB BLD-MCNC: 13.1 G/DL (ref 12–17)
INR PPP: 1.38 (ref 0.86–1.16)
MCH RBC QN AUTO: 32.3 PG (ref 26.8–34.3)
MCHC RBC AUTO-ENTMCNC: 33.5 G/DL (ref 31.4–37.4)
MCV RBC AUTO: 96 FL (ref 82–98)
P AXIS: 50 DEGREES
PLATELET # BLD AUTO: 85 THOUSANDS/UL (ref 149–390)
PMV BLD AUTO: 9.5 FL (ref 8.9–12.7)
POTASSIUM SERPL-SCNC: 4 MMOL/L (ref 3.5–5.3)
PR INTERVAL: 183 MS
PROTHROMBIN TIME: 17 SECONDS (ref 12.1–14.4)
QRS AXIS: 37 DEGREES
QRSD INTERVAL: 92 MS
QT INTERVAL: 446 MS
QTC INTERVAL: 495 MS
RBC # BLD AUTO: 4.06 MILLION/UL (ref 3.88–5.62)
SODIUM SERPL-SCNC: 138 MMOL/L (ref 136–145)
T WAVE AXIS: 31 DEGREES
VENTRICULAR RATE: 74 BPM
WBC # BLD AUTO: 11.77 THOUSAND/UL (ref 4.31–10.16)

## 2018-04-24 PROCEDURE — 80048 BASIC METABOLIC PNL TOTAL CA: CPT | Performed by: SURGERY

## 2018-04-24 PROCEDURE — 85027 COMPLETE CBC AUTOMATED: CPT | Performed by: SURGERY

## 2018-04-24 PROCEDURE — 85610 PROTHROMBIN TIME: CPT | Performed by: SURGERY

## 2018-04-24 PROCEDURE — 99024 POSTOP FOLLOW-UP VISIT: CPT | Performed by: SURGERY

## 2018-04-24 PROCEDURE — 93010 ELECTROCARDIOGRAM REPORT: CPT | Performed by: INTERNAL MEDICINE

## 2018-04-24 RX ORDER — ACETAMINOPHEN 325 MG/1
650 TABLET ORAL EVERY 6 HOURS PRN
Status: DISCONTINUED | OUTPATIENT
Start: 2018-04-24 | End: 2018-04-24 | Stop reason: HOSPADM

## 2018-04-24 RX ORDER — ACETAMINOPHEN 325 MG/1
650 TABLET ORAL EVERY 6 HOURS PRN
Qty: 30 TABLET | Refills: 0 | COMMUNITY
Start: 2018-04-24 | End: 2018-06-03

## 2018-04-24 RX ORDER — LISINOPRIL 20 MG/1
20 TABLET ORAL DAILY
Refills: 0 | COMMUNITY
Start: 2018-04-25 | End: 2018-08-23 | Stop reason: SDUPTHER

## 2018-04-24 RX ADMIN — ENOXAPARIN SODIUM 40 MG: 40 INJECTION SUBCUTANEOUS at 08:01

## 2018-04-24 NOTE — CASE MANAGEMENT
Initial Clinical Review    Age/Sex: 58 y o  male    Surgery Date: 04/23/18    Procedure:  REPAIR ANEURYSM ENDOVASCULAR ABDOMINAL AORTIC  (EVAR)     Anesthesia: general    Admission Orders: Date/Time/Statement: 4/23/18 @ 1002 Inpatient Written     Orders Placed This Encounter   Procedures    Inpatient Admission     Standing Status:   Standing     Number of Occurrences:   1     Order Specific Question:   Admitting Physician     Answer:   Jose Dominguez     Order Specific Question:   Level of Care     Answer:   Level 1 Stepdown [13]     Order Specific Question:   Estimated length of stay     Answer:   Inpatient Only Surgery       Vital Signs: /71   Pulse 70   Temp 98 °F (36 7 °C) (Oral)   Resp 18   Ht 5' 10" (1 778 m)   Wt 113 kg (250 lb)   SpO2 95%   BMI 35 87 kg/m²     Diet:        Diet Orders            Start     Ordered    04/23/18 1026  Diet Regular; Regular House  Diet effective now     Question Answer Comment   Diet Type Regular    Regular Regular House    RD to adjust diet per protocol? Yes        04/23/18 1026          Mobility: ambulatory    DVT Prophylaxis: Sequential compression device    Pain Control:   Pain Medications             acetaminophen (TYLENOL) 325 mg tablet Take 2 tablets (650 mg total) by mouth every 6 (six) hours as needed for mild pain          Thank you,  [facility] in the [facility] by Dontae Rich for 2017  Network Utilization Review Department  Phone: [phone]; Fax [phone]  ATTENTION: The Network Utilization Review Department is now centralized for our 7 Facilities  Make a note that we have a new phone and fax numbers for our Department  Please call with any questions or concerns to [phone] and carefully follow the prompts so that you are directed to the right person   All voicemails are confidential  Fax any determinations, approvals, denials, and requests for initial or continue stay review clinical to [phone]  Due to HIGH CALL volume, it would be easier if you could please send faxed requests to expedite your requests and in part, help us provide discharge notifications faster

## 2018-04-24 NOTE — SOCIAL WORK
CM met with pt to discuss DCP and cm role  Pt lives alone in [address] [place] [address] with no delmis  Prior to admission pt was independent with ambulation and with ADLS  No prior hx of VNA  [facility]'s [facility]  Pt denies any hx of drug/EOTH or inpt psychiatric stays  Patient/caregiver received discharge checklist  Content reviewed  Patient/caregiver encouraged to participate in discharge plan of care prior to discharge home  CM reviewed d/c planning process including the following: identifying help at home, patient preference for d/c planning needs, Discharge Lounge, [facility] Meds to Bed program, availability of treatment team to discuss questions or concerns patient and/or family may have regarding understanding medications and recognizing signs and symptoms once discharged  CM also encouraged patient to follow up with all recommended appointments after discharge  Patient advised of importance for patient and family to participate in managing patients medical well being  Pt will be discharged today and will be staying with his sister

## 2018-04-24 NOTE — DISCHARGE SUMMARY
Discharge Summary    Rachael Holcomb 58 y o  male MRN: [id number]    Unit/Bed#: [facility] 528-01 Encounter: [id number]    Admission Date: 4/23/2018     Discharge Date: 4/24/2018    Attending: Mario Antonio    Admitting Diagnosis: AAA (abdominal aortic aneurysm) without rupture ([address] ) [I71 4]    Principle Diagnosis:AAA (abdominal aortic aneurysm) without rupture ([address] ) [I71 4]    Secondary Diagnosis:  Past Medical History:   Diagnosis Date    Abdominal aortic aneurysm (AAA) ([address] )     H/O shoulder surgery     Hard to intubate     Grade 4 view with MAC 4, suggest video laryngoscope in future    Hypertension     Liver disease      Past Surgical History:   Procedure Laterality Date    COLON SURGERY      colonoscopy    HERNIA REPAIR      KNEE ARTHROSCOPY          Procedures Performed: EVAR 4/23    Discharge Medications:  See after visit summary for reconciled discharge medications provided to patient and family  Hospital Course:   68-year-old gentleman admitted to [facility] on 4/23/18 for treatment of AAA without rupture  He underwent EVAR on the day of admission as scheduled by Dr Mraio Antonio  He tolerated surgery well  He was transferred to step-down unit for monitoring overnight  He was found to be hemodynamically stable and neurovascular exam was appropriate on pod 1  He was able to tolerate a diet, ambulate and void without difficulty  His pain was well controlled  He was deemed appropriate for discharge home in the care of his family  He was given instructions for his discharge medications instructions for wound care and activity limitations following surgery  He was instructed to follow up with Dr  our skin as indicated  He was instructed to call with any questions concerns or changes in his condition  Condition at Discharge: good     Provisions for Follow-Up Care:  See after visit summary for information related to follow-up care and any pertinent home health orders        Disposition: Home    Discharge instructions/Information to patient and family:   See after visit summary for information provided to patient and family  Planned Readmission: No    Discharge Statement   I spent 30 minutes discharging the patient  This time was spent on the day of discharge  I had direct contact with the patient on the day of discharge  Additional documentation is required if more than 30 minutes were spent on discharge

## 2018-04-24 NOTE — ASSESSMENT & PLAN NOTE
S/P ISMAEL 4/23    Plan:  --GERRY/ALEJANDRA redding  --GERRY/ALEJANDRA flower  --Saline lock  --Advance diet  --OOB/AMB  --Discharge home later today

## 2018-04-24 NOTE — PROGRESS NOTES
Vascular Surgery    Progress Note - Ruben Condon 1955, 58 y o  male MRN: [id number]    Unit/Bed#: [facility] 528-01 Encounter: [id number]    Primary Care Provider: ELIZABETH Johnson   Date and time admitted to hospital: 4/23/2018  5:15 AM      * AAA (abdominal aortic aneurysm) without rupture [facility])   Assessment & Plan    S/P EVAR 4/23    Plan:  --D/C vahid  --D/C flower  --Saline lock  --Advance diet  --OOB/AMB  --Discharge home later today              Subjective:  Pt doing well  Denies pain    Vitals:  /79 (BP Location: Right arm)   Pulse 62   Temp 97 9 °F (36 6 °C) (Oral)   Resp 18   Ht 5' 10" (1 778 m)   Wt 113 kg (250 lb)   SpO2 (!) 83%   BMI 35 87 kg/m²     I/Os:  I/O last 3 completed shifts:   In: 3667 5 [P O :360; I V :3307 5]  Out: 3950 [Urine:3650; Blood:300]  I/O this shift:  In: 340 [P O :340]  Out: 1450 [Urine:1450]    Lab Results and Cultures:   Lab Results   Component Value Date    WBC 7 42 04/09/2018    HGB 15 0 04/09/2018    HCT 44 1 04/09/2018    MCV 94 04/09/2018    PLT 67 (L) 04/23/2018     Lab Results   Component Value Date    GLUCOSE 164 (H) 03/07/2018    CALCIUM 8 4 04/09/2018     04/09/2018    K 3 8 04/09/2018    CO2 22 04/09/2018     04/09/2018    BUN 17 04/09/2018    CREATININE 1 07 04/09/2018       Medications:  Current Facility-Administered Medications   Medication Dose Route Frequency    enoxaparin (LOVENOX) subcutaneous injection 40 mg  40 mg Subcutaneous Daily    HYDROcodone-acetaminophen (NORCO) 5-325 mg per tablet 1 tablet  1 tablet Oral Q6H PRN    metoprolol (LOPRESSOR) injection 5 mg  5 mg Intravenous Q8H PRN    phenol (CHLORASEPTIC) 1 4 % mucosal liquid 1 spray  1 spray Mouth/Throat Q2H PRN       Physical Exam:    General appearance: alert and oriented, in no acute distress  Lungs: clear to auscultation bilaterally  Heart: regular rate and rhythm, S1, S2 normal, no murmur, click, rub or gallop  Abdomen: soft, non-tender; bowel sounds normal; no masses,  no organomegaly  Extremities: extremities normal, warm and well-perfused; no cyanosis, clubbing, or edema    Wound/Incision:  B/l  Groin punctures incision clean, dry, and intact    Pulse exam:  DP: Right: 2+ Left: 2+  PT: Right: 2+ Left: 2+      Nanette Lomeli PA-C  4/24/2018

## 2018-04-24 NOTE — PROGRESS NOTES
Post-Op Check    Assessment: 58 y o  s/p EVAR    BP better controlled than earlier yesterday    Plan:  Diet as tolerated  Anticipate d/c of Miesha flower, and IVF in AM  Anticipate d/c patient in AM  Cont PRN lopressor    Subjective:  Patient seen earlier in night  No complaints  Negligible pain       Vitals:    04/23/18 2340   BP: 132/73   Pulse: 68   Resp: 18   Temp: 98 2 °F (36 8 °C)   SpO2: 94%       PE:  Gen: NAD, AAOx3  CV: RRR  Pulm: no resp distress  Abd: Soft, non-tender, non-distended  Groin sites c/d/i without hematoma    Creighton Manners PGY2  General Surgery

## 2018-04-24 NOTE — DISCHARGE INSTRUCTIONS
DISCHARGE INSTRUCTIONS  ENDOVASCULAR ANEURYSM REPAIR    Following discharge from the hospital, you may have some questions about your operation, your activities or your general condition  These instructions may answer some of your questions and help you adjust during the first few weeks following your operation  ACTIVITY:  Limit your physical activity to slow walking  Avoid climbing or heavy lifting for the first two weeks after surgery  Walking up steps and normal activities may be resumed, as you feel ready  You should not drive a car for three to four days following discharge from the hospital   You may ride in a car upon discharge  DIET:  Resume your normal diet  Try to eat low fat and low cholesterol foods  INCISION:  You may include the operated area in a shower on the second day following surgery  Sitting in a tub is not recommended for the first week following surgery or if you have any open wounds  Your physician may have chosen to use a type of adhesive glue, to close your incision  There are stitches present under the skin which will absorb on their own  The glue is used to cover the incision, assist in closure, and prevent contamination  This adhesive will darken and peel away on its own within one to two weeks  If one is present, you may remove the dressing, band-aid or steri-strips over your wound after two days  Numbness in the region of the incision may occur following the surgery  This normally resolves in six to twelve months  It is normal to have some bruising, swelling or mild discoloration around the incision  If increasing redness or pain develops, call our office immediately  If you notice any active bleeding, apply pressure to the site and call 911 or go to the nearest Emergency Department  [phone] [facility] [phone]  [address] [place] [address] , [address], [place], [address] [address], [place], [address] [facility]  [address]   [facility] [address], [place], [address]  [address] [facility], [facility], [facility], [address] [facility] [address]  [address], [address], [address]  [address] [facility], [facility] 97   [address] [facility], [address] [facility], [facility], [address] [place], [address] [place] [address], [facility] 6    FOLLOW UP STUDIES:  Doppler ultrasound studies, CT scans and abdominal x-rays are very important to your post-operative care  Your surgeon will arrange for them at your first postoperative visit

## 2018-04-30 NOTE — CASE MANAGEMENT
Notification of Discharge  This is a Notification of Discharge from our facility [address]  Please be advised that this patient has been discharge from our facility  Below you will find the admission and discharge date and time including the patients disposition  PRESENTATION DATE: 4/23/2018  5:15 AM  IP ADMISSION DATE: 4/23/18 1002  DISCHARGE DATE: 4/24/2018 12:20 PM  DISPOSITION: Home/Self Care    [address] [facility] in the [facility] by Dontae Rich for 2017  Network Utilization Review Department  Phone: [phone]; Fax [phone]  ATTENTION: The Network Utilization Review Department is now centralized for our 7 Facilities  Make a note that we have a new phone and fax numbers for our Department  Please call with any questions or concerns to [phone] and carefully follow the prompts so that you are directed to the right person  All voicemails are confidential  Fax any determinations, approvals, denials, and requests for initial or continue stay review clinical to [phone]  Due to HIGH CALL volume, it would be easier if you could please send faxed requests to expedite your requests and in part, help us provide discharge notifications faster

## 2018-05-01 ENCOUNTER — OFFICE VISIT (OUTPATIENT)
Dept: VASCULAR SURGERY | Facility: CLINIC | Age: 63
End: 2018-05-01

## 2018-05-01 VITALS
WEIGHT: 245 LBS | TEMPERATURE: 99.3 F | RESPIRATION RATE: 16 BRPM | HEIGHT: 70 IN | SYSTOLIC BLOOD PRESSURE: 142 MMHG | BODY MASS INDEX: 35.07 KG/M2 | HEART RATE: 78 BPM | DIASTOLIC BLOOD PRESSURE: 78 MMHG

## 2018-05-01 DIAGNOSIS — I71.4 AAA (ABDOMINAL AORTIC ANEURYSM) WITHOUT RUPTURE (HCC): Primary | Chronic | ICD-10-CM

## 2018-05-01 PROCEDURE — 99024 POSTOP FOLLOW-UP VISIT: CPT | Performed by: SURGERY

## 2018-05-01 NOTE — PROGRESS NOTES
Assessment/Plan:    AAA (abdominal aortic aneurysm) without rupture (HCC)  Abdominal aortic aneurysm status post endovascular repair  He is doing well postoperatively  He will continue to increase his activities as tolerated  I suggested he can return to work next week  We will plan standard follow-up with CT scan at 1 month and office visit at that time  Diagnoses and all orders for this visit:    AAA (abdominal aortic aneurysm) without rupture ([address] )  -     Basic metabolic panel; Future  -     CTA abdomen pelvis w wo contrast; Future               Patient ID: Stephanie Sauer is a 58 y o  male  Chief Complaint: Pt is here s/p EVAR done 4/23/18  Pt still c/o of abd pain when laying down  Pt denies back pain  Pt denies appetite changes or pain after eating  Pt states since sx he is experiencing a lot of weakness  59-year-old approximately 1 week status post endovascular repair of his abdominal aortic aneurysm  He states he is doing well but has some generalized fatigue  He feels this is improving on a daily basis  He denies any new abdominal or back pain  He denies any claudication symptoms  He notes no significant discomfort at the puncture sites  The following portions of the patient's history were reviewed and updated as appropriate: allergies, current medications, past family history, past medical history, past social history, past surgical history and problem list     Review of Systems   Constitutional: Positive for fatigue  HENT: Negative  Eyes: Negative  Respiratory: Negative  Cardiovascular: Negative  Gastrointestinal: Negative  Endocrine: Negative  Genitourinary: Negative  Musculoskeletal: Negative  Skin: Negative  Allergic/Immunologic: Negative  Neurological: Positive for tremors and weakness  Hematological: Negative  Psychiatric/Behavioral: Negative            Objective:      /78 (BP Location: Right arm, Patient Position: Sitting, Cuff Size: Adult)   Pulse 78   Temp 99 3 °F (37 4 °C) (Tympanic)   Resp 16   Ht 5' 10" (1 778 m)   Wt 111 kg (245 lb)   BMI 35 15 kg/m²          Physical Exam      Physical exam:    Abdominal exam is benign, soft, nontender    Extremities:  Bruising of bilateral groins with easily palpable femoral and popliteal pulses

## 2018-05-01 NOTE — PATIENT INSTRUCTIONS
Assessment/Plan:    AAA (abdominal aortic aneurysm) without rupture (HCC)  Abdominal aortic aneurysm status post endovascular repair  He is doing well postoperatively  He will continue to increase his activities as tolerated  I suggested he can return to work next week  We will plan standard follow-up with CT scan at 1 month and office visit at that time

## 2018-05-01 NOTE — LETTER
May 1, 2018     Yared Mendoza, [address]  [address]  [facility] [address]    Patient: Myels Carter   YOB: 1955   Date of Visit: 5/1/2018       Dear Dr Sean Castano:    Thank you for referring Makenzie Mauricio to me for evaluation  Below are the relevant portions of my assessment and plan of care  Assessment/Plan:    AAA (abdominal aortic aneurysm) without rupture (HCC)  Abdominal aortic aneurysm status post endovascular repair  He is doing well postoperatively  He will continue to increase his activities as tolerated  I suggested he can return to work next week  We will plan standard follow-up with CT scan at 1 month and office visit at that time  If you have questions, please do not hesitate to call me  I look forward to following Kartik Rodriguez along with you           Sincerely,        Robin Holman MD        CC: No Recipients

## 2018-05-01 NOTE — ASSESSMENT & PLAN NOTE
Abdominal aortic aneurysm status post endovascular repair  He is doing well postoperatively  He will continue to increase his activities as tolerated  I suggested he can return to work next week  We will plan standard follow-up with CT scan at 1 month and office visit at that time

## 2018-06-01 ENCOUNTER — HOSPITAL ENCOUNTER (OUTPATIENT)
Dept: CT IMAGING | Facility: CLINIC | Age: 63
Discharge: HOME | End: 2018-06-01
Payer: COMMERCIAL

## 2018-06-01 DIAGNOSIS — I71.4 AAA (ABDOMINAL AORTIC ANEURYSM) WITHOUT RUPTURE (HCC): Chronic | ICD-10-CM

## 2018-06-01 PROCEDURE — 74174 CTA ABD&PLVS W/CONTRAST: CPT

## 2018-06-01 RX ADMIN — IOHEXOL 100 ML: 350 INJECTION, SOLUTION INTRAVENOUS at 08:39

## 2018-06-03 ENCOUNTER — HOSPITAL ENCOUNTER (INPATIENT)
Facility: HOSPITAL | Age: 63
LOS: 2 days | Discharge: HOME | DRG: 445 | End: 2018-06-05
Attending: EMERGENCY MEDICINE | Admitting: INTERNAL MEDICINE
Payer: COMMERCIAL

## 2018-06-03 ENCOUNTER — APPOINTMENT (EMERGENCY)
Dept: CT IMAGING | Facility: HOSPITAL | Age: 63
DRG: 445 | End: 2018-06-03
Attending: EMERGENCY MEDICINE
Payer: COMMERCIAL

## 2018-06-03 DIAGNOSIS — K81.0 ACUTE CHOLECYSTITIS: Primary | ICD-10-CM

## 2018-06-03 DIAGNOSIS — K80.00 CHOLELITHIASIS WITH ACUTE CHOLECYSTITIS: ICD-10-CM

## 2018-06-03 DIAGNOSIS — K80.20 CHOLELITHIASIS: ICD-10-CM

## 2018-06-03 DIAGNOSIS — K80.43 CALCULUS OF BILE DUCT WITH ACUTE CHOLECYSTITIS AND OBSTRUCTION: ICD-10-CM

## 2018-06-03 PROBLEM — I10 ESSENTIAL HYPERTENSION: Status: ACTIVE | Noted: 2018-06-03

## 2018-06-03 PROBLEM — K75.81 LIVER CIRRHOSIS SECONDARY TO NASH (HCC): Status: ACTIVE | Noted: 2018-06-03

## 2018-06-03 PROBLEM — E80.6 HYPERBILIRUBINEMIA: Status: ACTIVE | Noted: 2018-06-03

## 2018-06-03 PROBLEM — R74.01 TRANSAMINITIS: Status: ACTIVE | Noted: 2018-06-03

## 2018-06-03 PROBLEM — K74.60 CIRRHOSIS OF LIVER (HCC): Status: ACTIVE | Noted: 2018-06-03

## 2018-06-03 LAB
ALBUMIN SERPL BCP-MCNC: 3.1 G/DL (ref 3.5–5)
ALP SERPL-CCNC: 134 U/L (ref 46–116)
ALT SERPL W P-5'-P-CCNC: 35 U/L (ref 12–78)
ANION GAP SERPL CALCULATED.3IONS-SCNC: 13 MMOL/L (ref 4–13)
APTT PPP: 37 SECONDS (ref 24–36)
AST SERPL W P-5'-P-CCNC: 60 U/L (ref 5–45)
BASOPHILS # BLD AUTO: 0.05 THOUSANDS/ΜL (ref 0–0.1)
BASOPHILS NFR BLD AUTO: 1 % (ref 0–1)
BILIRUB DIRECT SERPL-MCNC: 0.44 MG/DL (ref 0–0.2)
BILIRUB SERPL-MCNC: 1.4 MG/DL (ref 0.2–1)
BUN SERPL-MCNC: 11 MG/DL (ref 5–25)
CALCIUM SERPL-MCNC: 8.3 MG/DL (ref 8.3–10.1)
CHLORIDE SERPL-SCNC: 106 MMOL/L (ref 100–108)
CO2 SERPL-SCNC: 23 MMOL/L (ref 21–32)
CREAT SERPL-MCNC: 1.08 MG/DL (ref 0.6–1.3)
EOSINOPHIL # BLD AUTO: 0.12 THOUSAND/ΜL (ref 0–0.61)
EOSINOPHIL NFR BLD AUTO: 2 % (ref 0–6)
ERYTHROCYTE [DISTWIDTH] IN BLOOD BY AUTOMATED COUNT: 14.3 % (ref 11.6–15.1)
GFR SERPL CREATININE-BSD FRML MDRD: 72 ML/MIN/1.73SQ M
GLUCOSE SERPL-MCNC: 163 MG/DL (ref 65–140)
HCT VFR BLD AUTO: 39.5 % (ref 36.5–49.3)
HGB BLD-MCNC: 13.3 G/DL (ref 12–17)
IMM GRANULOCYTES # BLD AUTO: 0.03 THOUSAND/UL (ref 0–0.2)
IMM GRANULOCYTES NFR BLD AUTO: 1 % (ref 0–2)
INR PPP: 1.26 (ref 0.86–1.17)
LIPASE SERPL-CCNC: 260 U/L (ref 73–393)
LYMPHOCYTES # BLD AUTO: 1.35 THOUSANDS/ΜL (ref 0.6–4.47)
LYMPHOCYTES NFR BLD AUTO: 23 % (ref 14–44)
MCH RBC QN AUTO: 32.4 PG (ref 26.8–34.3)
MCHC RBC AUTO-ENTMCNC: 33.7 G/DL (ref 31.4–37.4)
MCV RBC AUTO: 96 FL (ref 82–98)
MONOCYTES # BLD AUTO: 0.7 THOUSAND/ΜL (ref 0.17–1.22)
MONOCYTES NFR BLD AUTO: 12 % (ref 4–12)
NEUTROPHILS # BLD AUTO: 3.74 THOUSANDS/ΜL (ref 1.85–7.62)
NEUTS SEG NFR BLD AUTO: 61 % (ref 43–75)
NRBC BLD AUTO-RTO: 0 /100 WBCS
PLATELET # BLD AUTO: 128 THOUSANDS/UL (ref 149–390)
PMV BLD AUTO: 9.7 FL (ref 8.9–12.7)
POTASSIUM SERPL-SCNC: 3.4 MMOL/L (ref 3.5–5.3)
PROT SERPL-MCNC: 7.5 G/DL (ref 6.4–8.2)
PROTHROMBIN TIME: 15.1 SECONDS (ref 11.8–14.2)
RBC # BLD AUTO: 4.11 MILLION/UL (ref 3.88–5.62)
SODIUM SERPL-SCNC: 142 MMOL/L (ref 136–145)
TROPONIN I SERPL-MCNC: 0.02 NG/ML
WBC # BLD AUTO: 5.99 THOUSAND/UL (ref 4.31–10.16)

## 2018-06-03 PROCEDURE — 84484 ASSAY OF TROPONIN QUANT: CPT | Performed by: EMERGENCY MEDICINE

## 2018-06-03 PROCEDURE — 85025 COMPLETE CBC W/AUTO DIFF WBC: CPT | Performed by: EMERGENCY MEDICINE

## 2018-06-03 PROCEDURE — 36415 COLL VENOUS BLD VENIPUNCTURE: CPT | Performed by: EMERGENCY MEDICINE

## 2018-06-03 PROCEDURE — 96375 TX/PRO/DX INJ NEW DRUG ADDON: CPT

## 2018-06-03 PROCEDURE — 85610 PROTHROMBIN TIME: CPT | Performed by: EMERGENCY MEDICINE

## 2018-06-03 PROCEDURE — C9113 INJ PANTOPRAZOLE SODIUM, VIA: HCPCS | Performed by: EMERGENCY MEDICINE

## 2018-06-03 PROCEDURE — 83690 ASSAY OF LIPASE: CPT | Performed by: EMERGENCY MEDICINE

## 2018-06-03 PROCEDURE — 74177 CT ABD & PELVIS W/CONTRAST: CPT

## 2018-06-03 PROCEDURE — 96376 TX/PRO/DX INJ SAME DRUG ADON: CPT

## 2018-06-03 PROCEDURE — 99223 1ST HOSP IP/OBS HIGH 75: CPT | Performed by: INTERNAL MEDICINE

## 2018-06-03 PROCEDURE — 80076 HEPATIC FUNCTION PANEL: CPT | Performed by: EMERGENCY MEDICINE

## 2018-06-03 PROCEDURE — 85730 THROMBOPLASTIN TIME PARTIAL: CPT | Performed by: EMERGENCY MEDICINE

## 2018-06-03 PROCEDURE — 80048 BASIC METABOLIC PNL TOTAL CA: CPT | Performed by: EMERGENCY MEDICINE

## 2018-06-03 PROCEDURE — 99285 EMERGENCY DEPT VISIT HI MDM: CPT

## 2018-06-03 PROCEDURE — 93005 ELECTROCARDIOGRAM TRACING: CPT

## 2018-06-03 PROCEDURE — 96374 THER/PROPH/DIAG INJ IV PUSH: CPT

## 2018-06-03 RX ORDER — MAGNESIUM HYDROXIDE/ALUMINUM HYDROXICE/SIMETHICONE 120; 1200; 1200 MG/30ML; MG/30ML; MG/30ML
15 SUSPENSION ORAL ONCE
Status: COMPLETED | OUTPATIENT
Start: 2018-06-03 | End: 2018-06-03

## 2018-06-03 RX ORDER — POTASSIUM CHLORIDE 20 MEQ/1
40 TABLET, EXTENDED RELEASE ORAL ONCE
Status: DISCONTINUED | OUTPATIENT
Start: 2018-06-03 | End: 2018-06-03

## 2018-06-03 RX ORDER — HEPARIN SODIUM 5000 [USP'U]/ML
5000 INJECTION, SOLUTION INTRAVENOUS; SUBCUTANEOUS EVERY 8 HOURS SCHEDULED
Status: DISCONTINUED | OUTPATIENT
Start: 2018-06-03 | End: 2018-06-05 | Stop reason: HOSPADM

## 2018-06-03 RX ORDER — ZOLPIDEM TARTRATE 5 MG/1
10 TABLET ORAL
Status: DISCONTINUED | OUTPATIENT
Start: 2018-06-03 | End: 2018-06-05 | Stop reason: HOSPADM

## 2018-06-03 RX ORDER — LISINOPRIL 20 MG/1
20 TABLET ORAL DAILY
Status: DISCONTINUED | OUTPATIENT
Start: 2018-06-03 | End: 2018-06-05 | Stop reason: HOSPADM

## 2018-06-03 RX ORDER — POTASSIUM CHLORIDE 14.9 MG/ML
20 INJECTION INTRAVENOUS ONCE
Status: COMPLETED | OUTPATIENT
Start: 2018-06-03 | End: 2018-06-04

## 2018-06-03 RX ORDER — PANTOPRAZOLE SODIUM 40 MG/1
40 INJECTION, POWDER, FOR SOLUTION INTRAVENOUS ONCE
Status: COMPLETED | OUTPATIENT
Start: 2018-06-03 | End: 2018-06-03

## 2018-06-03 RX ORDER — ONDANSETRON 2 MG/ML
4 INJECTION INTRAMUSCULAR; INTRAVENOUS EVERY 6 HOURS PRN
Status: DISCONTINUED | OUTPATIENT
Start: 2018-06-03 | End: 2018-06-05 | Stop reason: HOSPADM

## 2018-06-03 RX ORDER — HYDROCHLOROTHIAZIDE 25 MG/1
25 TABLET ORAL DAILY
Status: DISCONTINUED | OUTPATIENT
Start: 2018-06-03 | End: 2018-06-05 | Stop reason: HOSPADM

## 2018-06-03 RX ORDER — SODIUM CHLORIDE 9 MG/ML
50 INJECTION, SOLUTION INTRAVENOUS CONTINUOUS
Status: DISCONTINUED | OUTPATIENT
Start: 2018-06-03 | End: 2018-06-04

## 2018-06-03 RX ADMIN — HEPARIN SODIUM 5000 UNITS: 5000 INJECTION, SOLUTION INTRAVENOUS; SUBCUTANEOUS at 13:23

## 2018-06-03 RX ADMIN — LISINOPRIL 20 MG: 20 TABLET ORAL at 09:11

## 2018-06-03 RX ADMIN — ZOLPIDEM TARTRATE 5 MG: 5 TABLET ORAL at 23:51

## 2018-06-03 RX ADMIN — PIPERACILLIN SODIUM,TAZOBACTAM SODIUM 4.5 G: 4; .5 INJECTION, POWDER, FOR SOLUTION INTRAVENOUS at 22:08

## 2018-06-03 RX ADMIN — LIDOCAINE HYDROCHLORIDE 10 ML: 20 SOLUTION ORAL; TOPICAL at 01:20

## 2018-06-03 RX ADMIN — ONDANSETRON 4 MG: 2 INJECTION INTRAMUSCULAR; INTRAVENOUS at 09:15

## 2018-06-03 RX ADMIN — PIPERACILLIN SODIUM,TAZOBACTAM SODIUM 4.5 G: 4; .5 INJECTION, POWDER, FOR SOLUTION INTRAVENOUS at 15:15

## 2018-06-03 RX ADMIN — HEPARIN SODIUM 5000 UNITS: 5000 INJECTION, SOLUTION INTRAVENOUS; SUBCUTANEOUS at 22:08

## 2018-06-03 RX ADMIN — HEPARIN SODIUM 5000 UNITS: 5000 INJECTION, SOLUTION INTRAVENOUS; SUBCUTANEOUS at 07:38

## 2018-06-03 RX ADMIN — SODIUM CHLORIDE 125 ML/HR: 0.9 INJECTION, SOLUTION INTRAVENOUS at 07:38

## 2018-06-03 RX ADMIN — IOHEXOL 100 ML: 350 INJECTION, SOLUTION INTRAVENOUS at 03:29

## 2018-06-03 RX ADMIN — POTASSIUM CHLORIDE 20 MEQ: 200 INJECTION, SOLUTION INTRAVENOUS at 13:23

## 2018-06-03 RX ADMIN — PANTOPRAZOLE SODIUM 40 MG: 40 INJECTION, POWDER, FOR SOLUTION INTRAVENOUS at 01:12

## 2018-06-03 RX ADMIN — PIPERACILLIN SODIUM,TAZOBACTAM SODIUM 4.5 G: 4; .5 INJECTION, POWDER, FOR SOLUTION INTRAVENOUS at 09:12

## 2018-06-03 RX ADMIN — ALUMINUM HYDROXIDE, MAGNESIUM HYDROXIDE, AND SIMETHICONE 15 ML: 200; 200; 20 SUSPENSION ORAL at 01:19

## 2018-06-03 RX ADMIN — HYDROCHLOROTHIAZIDE 25 MG: 25 TABLET ORAL at 09:11

## 2018-06-03 RX ADMIN — HYDROMORPHONE HYDROCHLORIDE 0.5 MG: 1 INJECTION, SOLUTION INTRAMUSCULAR; INTRAVENOUS; SUBCUTANEOUS at 01:45

## 2018-06-03 RX ADMIN — HYDROMORPHONE HYDROCHLORIDE 1 MG: 1 INJECTION, SOLUTION INTRAMUSCULAR; INTRAVENOUS; SUBCUTANEOUS at 02:56

## 2018-06-03 NOTE — ASSESSMENT & PLAN NOTE
CT scan of abdomen shows cholelithiasis including a punctate calculus in the cystic duct  Gallbladder mildly distended  Will keep patient NPO  IV fluids for hydration  Will place inpatient consult to acute care surgery

## 2018-06-03 NOTE — CONSULTS
Consultation - General Surgery   Jay Wilkins 58 y o  male MRN: [id number]  Unit/Bed#: [address] [place] [address] 203-02 Encounter: [id number]    Assessment/Plan     Assessment:  The patient is a pleasant 57-year-old male with a past medical history significant for MILLER complicated by gastroesophageal varices by CT who presents to the [address] [place] [address] with a history and physical examination consistent with the diagnosis of acute biliary colic  The patient reports that since his hospitalization his symptoms have completely resolved  He is currently pain-free and reports feeling normal     In light of his liver history, a conservative approach has been recommended by 1st obtaining a an ultrasound of the abdomen to further define the nature of his gallstone disease  Based upon his clinical course and the additional supporting studies will make treatment recommendations accordingly    Plan:  1  Ultrasound of the abdomen  2  IV antibiotics   3  Advanced to clear liquid    History of Present Illness     HPI:  Jay Wilkins is a 58 y o  male who presents with signs and symptoms of acute biliary colic  Inpatient consult to Acute Care Surgery  Consult performed by: Leilani Delatorre ordered by: Ligia Barba          Review of Systems   Constitutional: Positive for activity change and appetite change  Negative for chills and fever  HENT: Negative  Eyes: Negative  Respiratory: Negative  Cardiovascular: Negative  Gastrointestinal: Positive for abdominal pain and nausea  Negative for abdominal distention, diarrhea and vomiting  Endocrine: Negative  Genitourinary: Negative  Musculoskeletal: Negative  Skin: Negative  Allergic/Immunologic: Negative  Neurological: Negative  Hematological: Negative  Psychiatric/Behavioral: Negative          Historical Information   Past Medical History:   Diagnosis Date    Abdominal aortic aneurysm (AAA) ([address] )     H/O shoulder surgery  Hard to intubate     Grade 4 view with MAC 4, suggest video laryngoscope in future    Hypertension     Liver disease     MILLER (nonalcoholic steatohepatitis)      Past Surgical History:   Procedure Laterality Date    COLON SURGERY      colonoscopy    HERNIA REPAIR      KNEE ARTHROSCOPY      LA EVASC RPR DPLMNT AORTO-AORTIC NDGFT N/A 4/23/2018    Procedure: REPAIR ANEURYSM ENDOVASCULAR ABDOMINAL AORTIC  (EVAR); Surgeon: Dianne Latif MD;  Location: [facility];  Service: Vascular     Social History   History   Alcohol Use    Yes     Comment: Ocassional     History   Drug Use No     History   Smoking Status    Former Smoker    Quit date: 1990   Smokeless Tobacco    Never Used     Family History: non-contributory    Meds/Allergies   all current active meds have been reviewed  No Known Allergies    Objective   First Vitals:   Blood Pressure: (!) 199/94 (06/03/18 0025)  Pulse: 61 (06/03/18 0022)  Temperature: 97 6 °F (36 4 °C) (06/03/18 0022)  Temp Source: Tympanic (06/03/18 0022)  Respirations: 18 (06/03/18 0022)  Height: 5' 10" (177 8 cm) (06/03/18 0500)  Weight - Scale: 112 kg (246 lb 14 6 oz) (06/03/18 0500)  SpO2: 94 % (06/03/18 0022)    Current Vitals:   Blood Pressure: 167/83 (06/03/18 0805)  Pulse: 61 (06/03/18 0805)  Temperature: 97 6 °F (36 4 °C) (06/03/18 0805)  Temp Source: Oral (06/03/18 0805)  Respirations: 18 (06/03/18 0805)  Height: 5' 10" (177 8 cm) (06/03/18 0500)  Weight - Scale: 112 kg (246 lb 14 6 oz) (06/03/18 0500)  SpO2: 93 % (06/03/18 0805)    No intake or output data in the 24 hours ending 06/03/18 1411    Invasive Devices     Peripheral Intravenous Line            Peripheral IV 06/03/18 Left Antecubital less than 1 day                Physical Exam   Constitutional: He is oriented to person, place, and time  The patient is a well-nourished well-developed male in no acute distress  He is awake alert and oriented x3    He appears to be a reliable historian with regards to his current condition and past medical history  Musculoskeletal: Normal range of motion  Neurological: He is alert and oriented to person, place, and time  Skin: Skin is warm and dry  Psychiatric: His behavior is normal  Judgment and thought content normal        Lab Results: I have personally reviewed pertinent lab results  Imaging: I have personally reviewed pertinent reports  EKG, Pathology, and Other Studies: I have personally reviewed pertinent reports  Counseling / Coordination of Care  Total floor / unit time spent today 30 minutes  Greater than 50% of total time was spent with the patient and / or family counseling and / or coordination of care

## 2018-06-03 NOTE — ASSESSMENT & PLAN NOTE
Patient follows with Dr Richard Bender  from [address]  Next scheduled appointment is June 18th  Will place inpatient consult to GI

## 2018-06-03 NOTE — ASSESSMENT & PLAN NOTE
CT scan of abdomen shows cholelithiasis including a punctate calculus in the cystic duct  Gallbladder mildly distended  Will place inpatient consult to acute care surgery

## 2018-06-03 NOTE — ED PROVIDER NOTES
History  Chief Complaint   Patient presents with    Abdominal Pain     Pt states abdominal pain  Denies V/D/N  Pt  states had chicken soup and thinks it might be food poisoning  This 48-year-old man status post recent  Abdominal aortic EVAR  Complains of epigastric pain  This started after eating soup around 6:00 p m     This pain is been throbbing and continuous  It is progressively worsening  Radiates to his back  Briefly radiated upwards into the chest but that has resolved  He has not felt this pain in the past   He denies nausea vomiting diarrhea fever dyspnea or change in chronic cough  He denies recent change in medications or diet  He states he has a history of cholelithiasis but no history of ulcers or pancreatitis            Prior to Admission Medications   Prescriptions Last Dose Informant Patient Reported? Taking? Multiple Vitamin (MULTIVITAMIN) capsule   Yes No   Sig: Take 1 capsule by mouth daily   hydrochlorothiazide (HYDRODIURIL) 25 mg tablet  Self Yes No   Sig: Take 25 mg by mouth daily As needed    lisinopril (ZESTRIL) 20 mg tablet   Yes No   Sig: Take 1 tablet (20 mg total) by mouth daily   zolpidem (AMBIEN) 10 mg tablet  Self Yes No   Sig: Take 10 mg by mouth daily at bedtime as needed        Facility-Administered Medications: None       Past Medical History:   Diagnosis Date    Abdominal aortic aneurysm (AAA) ([address] )     H/O shoulder surgery     Hard to intubate     Grade 4 view with MAC 4, suggest video laryngoscope in future    Hypertension     Liver disease        Past Surgical History:   Procedure Laterality Date    COLON SURGERY      colonoscopy    HERNIA REPAIR      KNEE ARTHROSCOPY      IA EVASC RPR DPLMNT AORTO-AORTIC NDGFT N/A 4/23/2018    Procedure: REPAIR ANEURYSM ENDOVASCULAR ABDOMINAL AORTIC  (EVAR);   Surgeon: Katie Whitman MD;  Location: [facility];  Service: Vascular       Family History   Problem Relation Age of Onset    Pancreatic cancer Mother    Hilda Barrios Diabetes Mother     Cancer Father     Diabetes Father      I have reviewed and agree with the history as documented  Social History   Substance Use Topics    Smoking status: Former Smoker     Quit date: 1990    Smokeless tobacco: Never Used    Alcohol use Yes      Comment: Ocassional        Review of Systems   Constitutional: Negative  HENT: Negative  Eyes: Negative  Respiratory: Negative  Cardiovascular: Negative  Gastrointestinal: Negative  Endocrine: Negative  Genitourinary: Negative  Musculoskeletal: Negative  Skin: Negative  Allergic/Immunologic: Negative  Neurological: Negative  Hematological: Negative  Psychiatric/Behavioral: Negative  All other systems reviewed and are negative  Physical Exam  Physical Exam   Constitutional: He is oriented to person, place, and time  He appears well-developed and well-nourished  He appears distressed  HENT:   Head: Normocephalic and atraumatic  Right Ear: External ear normal    Left Ear: External ear normal    Mouth/Throat: Oropharynx is clear and moist    Eyes: Conjunctivae and EOM are normal  Pupils are equal, round, and reactive to light  Neck: Normal range of motion  Neck supple  No JVD present  Cardiovascular: Normal rate, regular rhythm, normal heart sounds and intact distal pulses  No murmur heard  Pulmonary/Chest: Effort normal and breath sounds normal    Abdominal: Soft  Bowel sounds are normal  He exhibits no distension and no mass  There is tenderness ( epigastric)  There is no rebound and no guarding  Musculoskeletal: Normal range of motion  He exhibits no edema or tenderness  Lymphadenopathy:     He has no cervical adenopathy  Neurological: He is alert and oriented to person, place, and time  He has normal reflexes  No cranial nerve deficit  Coordination normal    Skin: Skin is warm and dry  Capillary refill takes less than 2 seconds  No rash noted  He is not diaphoretic     Psychiatric: He has a normal mood and affect  His behavior is normal    Nursing note and vitals reviewed        Vital Signs  ED Triage Vitals   Temperature Pulse Respirations Blood Pressure SpO2   06/03/18 0022 06/03/18 0022 06/03/18 0022 06/03/18 0025 06/03/18 0022   97 6 °F (36 4 °C) 61 18 (!) 199/94 94 %      Temp Source Heart Rate Source Patient Position - Orthostatic VS BP Location FiO2 (%)   06/03/18 0022 06/03/18 0022 06/03/18 0025 06/03/18 0025 --   Tympanic Monitor Lying Right arm       Pain Score       06/03/18 0022       8           Vitals:    06/03/18 0145 06/03/18 0300 06/03/18 0330 06/03/18 0345   BP: (!) 181/83 (!) 173/87 170/80 169/77   Pulse: 55 59 69 67   Patient Position - Orthostatic VS: Lying Lying Lying        Visual Acuity      ED Medications  Medications   pantoprazole (PROTONIX) injection 40 mg (40 mg Intravenous Given 6/3/18 0112)   aluminum-magnesium hydroxide-simethicone (MYLANTA) 200-200-20 mg/5 mL oral suspension 15 mL (15 mL Oral Given 6/3/18 0119)   lidocaine viscous (XYLOCAINE) 2 % mucosal solution 10 mL (10 mL Oral Given 6/3/18 0120)   HYDROmorphone (DILAUDID) injection 0 5 mg (0 5 mg Intravenous Given 6/3/18 0145)   HYDROmorphone (DILAUDID) injection 1 mg (1 mg Intravenous Given 6/3/18 0256)   iohexol (OMNIPAQUE) 350 MG/ML injection (SINGLE-DOSE) 100 mL (100 mL Intravenous Given 6/3/18 0329)       Diagnostic Studies  Results Reviewed     Procedure Component Value Units Date/Time    Protime-INR [[id number]]  (Abnormal) Collected:  06/03/18 0055    Lab Status:  Final result Specimen:  Blood from Arm, Left Updated:  06/03/18 0155     Protime 15 1 (H) seconds      INR 1 26 (H)    APTT [[id number]]  (Abnormal) Collected:  06/03/18 0055    Lab Status:  Final result Specimen:  Blood from Arm, Left Updated:  06/03/18 0155     PTT 37 (H) seconds     CBC and differential [[id number]]  (Abnormal) Collected:  06/03/18 0055    Lab Status:  Final result Specimen:  Blood from Arm, Left Updated:  06/03/18 0153     WBC 5 99 Thousand/uL      RBC 4 11 Million/uL      Hemoglobin 13 3 g/dL      Hematocrit 39 5 %      MCV 96 fL      MCH 32 4 pg      MCHC 33 7 g/dL      RDW 14 3 %      MPV 9 7 fL      Platelets 520 (L) Thousands/uL      nRBC 0 /100 WBCs      Neutrophils Relative 61 %      Immat GRANS % 1 %      Lymphocytes Relative 23 %      Monocytes Relative 12 %      Eosinophils Relative 2 %      Basophils Relative 1 %      Neutrophils Absolute 3 74 Thousands/µL      Immature Grans Absolute 0 03 Thousand/uL      Lymphocytes Absolute 1 35 Thousands/µL      Monocytes Absolute 0 70 Thousand/µL      Eosinophils Absolute 0 12 Thousand/µL      Basophils Absolute 0 05 Thousands/µL     Troponin I [[id number]]  (Normal) Collected:  06/03/18 0055    Lab Status:  Final result Specimen:  Blood from Arm, Left Updated:  06/03/18 0147     Troponin I 0 02 ng/mL     Narrative:         [employer] Chemistry analyzer 99% cutoff is > 0 04 ng/mL in network labs    o cTnI 99% cutoff is useful only when applied to patients in the clinical setting of myocardial ischemia  o cTnI 99% cutoff should be interpreted in the context of clinical history, ECG findings and possibly cardiac imaging to establish correct diagnosis  o cTnI 99% cutoff may be suggestive but clearly not indicative of a coronary event without the clinical setting of myocardial ischemia  Basic metabolic panel [[id number]]  (Abnormal) Collected:  06/03/18 0055    Lab Status:  Final result Specimen:  Blood from Arm, Left Updated:  06/03/18 0145     Sodium 142 mmol/L      Potassium 3 4 (L) mmol/L      Chloride 106 mmol/L      CO2 23 mmol/L      Anion Gap 13 mmol/L      BUN 11 mg/dL      Creatinine 1 08 mg/dL      Glucose 163 (H) mg/dL      Calcium 8 3 mg/dL      eGFR 72 ml/min/1 73sq m     Narrative:         National Kidney Disease Education Program recommendations are as follows:  GFR calculation is accurate only with a steady state creatinine  Chronic Kidney disease less than 60 ml/min/1 73 sq  meters  Kidney failure less than 15 ml/min/1 73 sq  meters  Hepatic function panel [[id number]]  (Abnormal) Collected:  06/03/18 0055    Lab Status:  Final result Specimen:  Blood from Arm, Left Updated:  06/03/18 0145     Total Bilirubin 1 40 (H) mg/dL      Bilirubin, Direct 0 44 (H) mg/dL      Alkaline Phosphatase 134 (H) U/L      AST 60 (H) U/L      ALT 35 U/L      Total Protein 7 5 g/dL      Albumin 3 1 (L) g/dL     Lipase [[id number]]  (Normal) Collected:  06/03/18 0055    Lab Status:  Final result Specimen:  Blood from Arm, Left Updated:  06/03/18 0145     Lipase 260 u/L     POCT urinalysis dipstick [[id number]]     Lab Status:  No result Specimen:  Urine                  CT abdomen pelvis with contrast    (Results Pending)              Procedures  ECG 12 Lead Documentation  Date/Time: 6/3/2018 12:34 AM  Performed by: Gladys Alba  Authorized by: Kennth Grand     ECG reviewed by me, the ED Provider: yes    Patient location:  ED  Previous ECG:     Previous ECG:  Compared to current    Comparison ECG info:    compared to April 23, 2018,  few PVCs now present  Rhythm:     Rhythm: sinus bradycardia    Ectopy:     Ectopy: PVCs    QRS:     QRS axis:  Normal    QRS intervals:  Normal  Conduction:     Conduction: normal             Phone Contacts  ED Phone Contact    ED Course  ED Course as of Jun 03 0401   Sun Jun 03, 2018   0140  Patient states he is no better after treatment  CT a of abdomen and pelvis that was done on 06/01 was read as aneurysmal sac diameter 5 7 centimeter status post modular and a graph repair of infrarenal abdominal aortic aneurysm  No clear evidence of an endoleak over a valuation is somewhat suboptimal due to lack of delayed imaging  No dissection  Cirrhosis with evidence of portal vein hypertension  Cholelithiasis without evidence of acute cholecystitis and no ductal dilatation  Nonobstructing left nephrolithiasis  No hydronephrosis                                  MDM  Number of Diagnoses or Management Options  Acute cholecystitis: new and requires workup  Cholelithiasis:      Amount and/or Complexity of Data Reviewed  Clinical lab tests: ordered and reviewed  Tests in the radiology section of CPT®: ordered and reviewed  Discuss the patient with other providers: yes  Independent visualization of images, tracings, or specimens: yes      CritCare Time    Disposition  Final diagnoses:   Acute cholecystitis   Cholelithiasis     Time reflects when diagnosis was documented in both MDM as applicable and the Disposition within this note     Time User Action Codes Description Comment    6/3/2018  4:00 AM Kleber Rain Add [K81 0] Acute cholecystitis     6/3/2018  4:00 AM Kleber Rain Add [K80 20] Cholelithiasis       ED Disposition     ED Disposition Condition Comment    Admit  Case was discussed with NP and the patient's admission status was agreed to be Admission Status: inpatient status to the service of Dr Robert Vaz     Follow-up Information    None         Patient's Medications   Discharge Prescriptions    No medications on file     No discharge procedures on file      ED Provider  Electronically Signed by           Mt Hameed DO  06/03/18 0928

## 2018-06-03 NOTE — ASSESSMENT & PLAN NOTE
Alk phos and AST slightly elevated  Most likely due to his non alcoholic liver disease  Will continue to monitor

## 2018-06-03 NOTE — ASSESSMENT & PLAN NOTE
BP slightly elevated  Most likely pain related  Continue hydrochlorothiazide and lisinopril  Monitor BP per nursing unit

## 2018-06-03 NOTE — ASSESSMENT & PLAN NOTE
Patient follows with Dr Yaakov Bishop  from [address]  Next scheduled appointment is June 18th  Will place inpatient consult to GI

## 2018-06-03 NOTE — H&P
H&P- Vernia Roof 1955, 58 y o  male MRN: [id number]    Unit/Bed#: [address] [place] [address] Encounter: [id number]    Primary Care Provider: ELIZABETH River   Date and time admitted to hospital: 6/3/2018 12:19 AM        * Cholelithiasis with acute cholecystitis   Assessment & Plan    CT scan of abdomen shows cholelithiasis including a punctate calculus in the cystic duct  Gallbladder mildly distended  Will keep patient NPO  IV fluids for hydration  Will place inpatient consult to acute care surgery  Hyperbilirubinemia   Assessment & Plan    Most likely due to coli lithiasis  Will continue to monitor  Transaminitis   Assessment & Plan    Alk phos and AST slightly elevated  Most likely due to his non alcoholic liver disease  Will continue to monitor  Essential hypertension   Assessment & Plan    BP slightly elevated  Most likely pain related  Continue hydrochlorothiazide and lisinopril  Monitor BP per nursing unit  AAA (abdominal aortic aneurysm) without rupture [facility])   Assessment & Plan    Patient is status post EVAR on 04/23/2018  CT scan of abdomen is negative for leak or dissection  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 [facility])   Assessment & Plan    Patient follows with Dr Delilah Steele  from [address]  Next scheduled appointment is June 18th  Will place inpatient consult to GI  VTE Prophylaxis: Heparin  / sequential compression device   Code Status: Full code  POLST: There is no POLST form on file for this patient (pre-hospital)  Discussion with family: No family present    Anticipated Length of Stay:  Patient will be admitted on an Inpatient basis with an anticipated length of stay of  > 2 midnights  Justification for Hospital Stay: Possible surgery    Total Time for Visit, including Counseling / Coordination of Care: 30 minutes  Greater than 50% of this total time spent on direct patient counseling and coordination of care      Chief Complaint:   Abdominal pain    History of Present Illness:    Cyndie Carroll is a 58 y o  male with history of abdominal aortic aneurysm status post EVAR on 17/74/6834, non alcoholic liver disease, and hypertension who presents with generalized abdominal pain  Patient states that at approximately 6:00 p m  he developed abdominal pain after eating bowl of chicken noodle soup  Patient described pain as constant 8/10 cramping that gradually became worse  Patient denies fevers or chills  No nausea, vomiting, or diarrhea  In the ED T bili 1 4 bili direct 0 44 alk phos 134 AST 60   CT scan shows cholelithiasis including of punctate calculus in the cystic duct with mildly distended gallbladder  Review of Systems:    Review of Systems   Constitutional: Negative for activity change, appetite change, chills and fever  Respiratory: Negative for apnea, cough and shortness of breath  Cardiovascular: Negative for chest pain, palpitations and leg swelling  Gastrointestinal: Positive for abdominal pain  Negative for abdominal distention, constipation, diarrhea, nausea and vomiting  Genitourinary: Negative for dysuria  Neurological: Negative for seizures, facial asymmetry, weakness, light-headedness, numbness and headaches  Psychiatric/Behavioral: Negative for agitation and confusion  The patient is not nervous/anxious  All other systems reviewed and are negative  Past Medical and Surgical History:     Past Medical History:   Diagnosis Date    Abdominal aortic aneurysm (AAA) ([address] )     H/O shoulder surgery     Hard to intubate     Grade 4 view with MAC 4, suggest video laryngoscope in future    Hypertension     Liver disease        Past Surgical History:   Procedure Laterality Date    COLON SURGERY      colonoscopy    HERNIA REPAIR      KNEE ARTHROSCOPY      MA EVASC RPR DPLMNT AORTO-AORTIC NDGFT N/A 4/23/2018    Procedure: REPAIR ANEURYSM ENDOVASCULAR ABDOMINAL AORTIC  (EVAR);   Surgeon: Mildred Chatterjee MD;  [facility];  Service: Vascular       Meds/Allergies:    Prior to Admission medications    Medication Sig Start Date End Date Taking? Authorizing Provider   hydrochlorothiazide (HYDRODIURIL) 25 mg tablet Take 25 mg by mouth daily As needed     Historical Provider, MD   lisinopril (ZESTRIL) 20 mg tablet Take 1 tablet (20 mg total) by mouth daily 4/25/18   Evelyne Connelly PA-C   Multiple Vitamin (MULTIVITAMIN) capsule Take 1 capsule by mouth daily    Historical Provider, MD   zolpidem (AMBIEN) 10 mg tablet Take 10 mg by mouth daily at bedtime as needed      Historical Provider, MD   acetaminophen (TYLENOL) 325 mg tablet Take 2 tablets (650 mg total) by mouth every 6 (six) hours as needed for mild pain 4/24/18 6/3/18  Evelyne Connelly PA-C     I have reviewed home medications with patient personally  Allergies: No Known Allergies    Social History:     Marital Status: Single   Occupation: Works for [employer]  Patient Pre-hospital Living Situation: Lives alone  Patient Pre-hospital Level of Mobility: Independent  Patient Pre-hospital Diet Restrictions: Low fat  Substance Use History:   History   Alcohol Use    Yes     Comment: Ocassional     History   Smoking Status    Former Smoker    Quit date: 1990   Smokeless Tobacco    Never Used     History   Drug Use No       Family History:    non-contributory    Physical Exam:     Vitals:   Blood Pressure: (!) 173/87 (06/03/18 0534)  Pulse: 61 (06/03/18 0534)  Temperature: 98 2 °F (36 8 °C) (06/03/18 0534)  Temp Source: Oral (06/03/18 0534)  Respirations: 18 (06/03/18 0534)  Height: 5' 10" (177 8 cm) (06/03/18 0500)  Weight - Scale: 112 kg (246 lb 14 6 oz) (06/03/18 0500)  SpO2: 94 % (06/03/18 0534)    Physical Exam   Constitutional: He is oriented to person, place, and time  He appears well-developed and well-nourished  No distress  HENT:   Head: Normocephalic and atraumatic  Eyes: EOM are normal  Pupils are equal, round, and reactive to light     Neck: Normal range of motion  Neck supple  Cardiovascular: Normal rate, regular rhythm, normal heart sounds and intact distal pulses  Exam reveals no gallop and no friction rub  No murmur heard  Pulmonary/Chest: Effort normal and breath sounds normal  No respiratory distress  He has no wheezes  He has no rales  Abdominal: Soft  Bowel sounds are normal  He exhibits no distension  There is no tenderness  There is no rigidity, no rebound and no guarding  Musculoskeletal: Normal range of motion  He exhibits no edema  Neurological: He is alert and oriented to person, place, and time  Skin: Skin is warm and dry  Psychiatric: He has a normal mood and affect  Judgment normal    Nursing note and vitals reviewed  Additional Data:     Lab Results: I have personally reviewed pertinent reports  Results from last 7 days  Lab Units 06/03/18  0055   WBC Thousand/uL 5 99   HEMOGLOBIN g/dL 13 3   HEMATOCRIT % 39 5   PLATELETS Thousands/uL 128*   NEUTROS PCT % 61   LYMPHS PCT % 23   MONOS PCT % 12   EOS PCT % 2       Results from last 7 days  Lab Units 06/03/18  0055   SODIUM mmol/L 142   POTASSIUM mmol/L 3 4*   CHLORIDE mmol/L 106   CO2 mmol/L 23   BUN mg/dL 11   CREATININE mg/dL 1 08   CALCIUM mg/dL 8 3   TOTAL PROTEIN g/dL 7 5   BILIRUBIN TOTAL mg/dL 1 40*   ALK PHOS U/L 134*   ALT U/L 35   AST U/L 60*   GLUCOSE RANDOM mg/dL 163*       Results from last 7 days  Lab Units 06/03/18  0055   INR  1 26*               Imaging: I have personally reviewed pertinent reports  CT abdomen pelvis with contrast   ED Interpretation by Enrique Keating DO (06/03 5825)   Per [facility], cholelithiasis including a punctate calculus in the cystic duct  Gallbladder is mildly distended  No ductal dilatation  Status post modular bifurcated endograft repair of infrarenal abdominal aortic aneurysm  No clear evidence of an endoleak  No dissection  Cirrhosis with portal venous hypertension              EKG, Pathology, and Other Studies Reviewed on Admission:   · EKG: SB with PVC    Allscripts / Epic Records Reviewed: Yes     ** Please Note: This note has been constructed using a voice recognition system   **

## 2018-06-04 ENCOUNTER — APPOINTMENT (INPATIENT)
Dept: ULTRASOUND IMAGING | Facility: HOSPITAL | Age: 63
DRG: 445 | End: 2018-06-04
Payer: COMMERCIAL

## 2018-06-04 PROBLEM — K80.20 CHOLELITHIASIS: Status: ACTIVE | Noted: 2018-06-04

## 2018-06-04 LAB
ALBUMIN SERPL BCP-MCNC: 2.5 G/DL (ref 3.5–5)
ALP SERPL-CCNC: 82 U/L (ref 46–116)
ALT SERPL W P-5'-P-CCNC: 27 U/L (ref 12–78)
ANION GAP SERPL CALCULATED.3IONS-SCNC: 8 MMOL/L (ref 4–13)
AST SERPL W P-5'-P-CCNC: 45 U/L (ref 5–45)
ATRIAL RATE: 59 BPM
BASOPHILS # BLD AUTO: 0.04 THOUSANDS/ΜL (ref 0–0.1)
BASOPHILS NFR BLD AUTO: 1 % (ref 0–1)
BILIRUB SERPL-MCNC: 2.4 MG/DL (ref 0.2–1)
BUN SERPL-MCNC: 9 MG/DL (ref 5–25)
CALCIUM SERPL-MCNC: 7.5 MG/DL (ref 8.3–10.1)
CHLORIDE SERPL-SCNC: 108 MMOL/L (ref 100–108)
CO2 SERPL-SCNC: 24 MMOL/L (ref 21–32)
CREAT SERPL-MCNC: 1.08 MG/DL (ref 0.6–1.3)
EOSINOPHIL # BLD AUTO: 0.18 THOUSAND/ΜL (ref 0–0.61)
EOSINOPHIL NFR BLD AUTO: 3 % (ref 0–6)
ERYTHROCYTE [DISTWIDTH] IN BLOOD BY AUTOMATED COUNT: 14.8 % (ref 11.6–15.1)
GFR SERPL CREATININE-BSD FRML MDRD: 72 ML/MIN/1.73SQ M
GLUCOSE SERPL-MCNC: 105 MG/DL (ref 65–140)
HCT VFR BLD AUTO: 38.2 % (ref 36.5–49.3)
HGB BLD-MCNC: 12.2 G/DL (ref 12–17)
IMM GRANULOCYTES # BLD AUTO: 0.03 THOUSAND/UL (ref 0–0.2)
IMM GRANULOCYTES NFR BLD AUTO: 1 % (ref 0–2)
LIPASE SERPL-CCNC: 227 U/L (ref 73–393)
LYMPHOCYTES # BLD AUTO: 2.07 THOUSANDS/ΜL (ref 0.6–4.47)
LYMPHOCYTES NFR BLD AUTO: 32 % (ref 14–44)
MCH RBC QN AUTO: 31.7 PG (ref 26.8–34.3)
MCHC RBC AUTO-ENTMCNC: 31.9 G/DL (ref 31.4–37.4)
MCV RBC AUTO: 99 FL (ref 82–98)
MONOCYTES # BLD AUTO: 0.69 THOUSAND/ΜL (ref 0.17–1.22)
MONOCYTES NFR BLD AUTO: 11 % (ref 4–12)
NEUTROPHILS # BLD AUTO: 3.57 THOUSANDS/ΜL (ref 1.85–7.62)
NEUTS SEG NFR BLD AUTO: 52 % (ref 43–75)
NRBC BLD AUTO-RTO: 0 /100 WBCS
P AXIS: 9 DEGREES
PLATELET # BLD AUTO: 94 THOUSANDS/UL (ref 149–390)
PMV BLD AUTO: 9.4 FL (ref 8.9–12.7)
POTASSIUM SERPL-SCNC: 3.7 MMOL/L (ref 3.5–5.3)
PR INTERVAL: 188 MS
PROT SERPL-MCNC: 6.4 G/DL (ref 6.4–8.2)
QRS AXIS: 18 DEGREES
QRSD INTERVAL: 96 MS
QT INTERVAL: 476 MS
QTC INTERVAL: 471 MS
RBC # BLD AUTO: 3.85 MILLION/UL (ref 3.88–5.62)
SODIUM SERPL-SCNC: 140 MMOL/L (ref 136–145)
T WAVE AXIS: 73 DEGREES
VENTRICULAR RATE: 59 BPM
WBC # BLD AUTO: 6.58 THOUSAND/UL (ref 4.31–10.16)

## 2018-06-04 PROCEDURE — 99232 SBSQ HOSP IP/OBS MODERATE 35: CPT | Performed by: INTERNAL MEDICINE

## 2018-06-04 PROCEDURE — 99232 SBSQ HOSP IP/OBS MODERATE 35: CPT | Performed by: SURGERY

## 2018-06-04 PROCEDURE — 80053 COMPREHEN METABOLIC PANEL: CPT | Performed by: INTERNAL MEDICINE

## 2018-06-04 PROCEDURE — 93010 ELECTROCARDIOGRAM REPORT: CPT | Performed by: INTERNAL MEDICINE

## 2018-06-04 PROCEDURE — 83690 ASSAY OF LIPASE: CPT | Performed by: INTERNAL MEDICINE

## 2018-06-04 PROCEDURE — 76705 ECHO EXAM OF ABDOMEN: CPT

## 2018-06-04 PROCEDURE — 85025 COMPLETE CBC W/AUTO DIFF WBC: CPT | Performed by: NURSE PRACTITIONER

## 2018-06-04 RX ADMIN — HEPARIN SODIUM 5000 UNITS: 5000 INJECTION, SOLUTION INTRAVENOUS; SUBCUTANEOUS at 21:45

## 2018-06-04 RX ADMIN — HEPARIN SODIUM 5000 UNITS: 5000 INJECTION, SOLUTION INTRAVENOUS; SUBCUTANEOUS at 05:26

## 2018-06-04 RX ADMIN — PIPERACILLIN SODIUM,TAZOBACTAM SODIUM 4.5 G: 4; .5 INJECTION, POWDER, FOR SOLUTION INTRAVENOUS at 02:29

## 2018-06-04 RX ADMIN — PIPERACILLIN SODIUM,TAZOBACTAM SODIUM 4.5 G: 4; .5 INJECTION, POWDER, FOR SOLUTION INTRAVENOUS at 14:34

## 2018-06-04 RX ADMIN — HEPARIN SODIUM 5000 UNITS: 5000 INJECTION, SOLUTION INTRAVENOUS; SUBCUTANEOUS at 14:34

## 2018-06-04 RX ADMIN — HYDROCHLOROTHIAZIDE 25 MG: 25 TABLET ORAL at 08:04

## 2018-06-04 RX ADMIN — LISINOPRIL 20 MG: 20 TABLET ORAL at 08:04

## 2018-06-04 RX ADMIN — SODIUM CHLORIDE 125 ML/HR: 0.9 INJECTION, SOLUTION INTRAVENOUS at 01:26

## 2018-06-04 RX ADMIN — ZOLPIDEM TARTRATE 5 MG: 5 TABLET ORAL at 23:41

## 2018-06-04 RX ADMIN — PIPERACILLIN SODIUM,TAZOBACTAM SODIUM 4.5 G: 4; .5 INJECTION, POWDER, FOR SOLUTION INTRAVENOUS at 08:04

## 2018-06-04 NOTE — PROGRESS NOTES
[place]  [id number]    69 y o   male      ASSESSMENT  54-year-old male admitted with epigastric pain  CT scan shows gallstones  LFTs are mildly elevated but he has known cirrhosis and actually varices visible on the CT scan  Aminotransferase levels have normalized but total bilirubin is slowly climbing  I would recommend an ultrasound to further delineate the gallbladder anatomy  I do think this is biliary colic  He is high risk for cholecystectomy given his portal hypertension  Had an EGD a year ago according to the patient which was unremarkable  Need to review this  If he continues to be symptomatic and the ultrasound confirms cholelithiasis or shows cholecystitis he will need a cholecystectomy  At this point I have no plans to repeat the EGD but depending on how he does we may reconsider this        PLAN  1  Follow LFTs  2  Check an abdominal ultrasound today    Chief Complaint   Patient presents with    Abdominal Pain     Pt states abdominal pain  Denies V/D/N  Pt  states had chicken soup and thinks it might be food poisoning  SUBJECTIVE/HPI   No further abdominal pain on clear liquids  Scheduled for abdominal ultrasound today    Denies any nausea vomiting    /80 (BP Location: Right arm)   Pulse 63   Temp 97 9 °F (36 6 °C) (Oral)   Resp 17   Ht 5' 10" (1 778 m)   Wt 111 kg (245 lb 2 4 oz)   SpO2 93%   BMI 35 18 kg/m²       PHYSICALEXAM  Constitutional:   no acute distress, non-toxic appearance   Eyes:  conjunctiva normal   HENT:  Atraumatic   Respiratory:  No respiratory distress   Cardiovascular:  Normal rate  GI:  Soft, nondistended, normal bowel sounds, mild tenderness right upper quadrant, no rebound or guarding  Musculoskeletal:  No edema  Neurologic:  Alert & oriented x 3      Lab Results   Component Value Date    GLUCOSE 105 06/04/2018    CALCIUM 7 5 (L) 06/04/2018     06/04/2018    K 3 7 06/04/2018    CO2 24 06/04/2018     06/04/2018    BUN 9 06/04/2018 CREATININE 1 08 06/04/2018     Lab Results   Component Value Date    WBC 6 58 06/04/2018    HGB 12 2 06/04/2018    HCT 38 2 06/04/2018    MCV 99 (H) 06/04/2018    PLT 94 (L) 06/04/2018     Lab Results   Component Value Date    ALT 27 06/04/2018    AST 45 06/04/2018    ALKPHOS 82 06/04/2018    BILITOT 2 40 (H) 06/04/2018     No results found for: AMYLASE  Lab Results   Component Value Date    LIPASE 227 06/04/2018     No results found for: IRON, TIBC, FERRITIN  Lab Results   Component Value Date    INR 1 26 (H) 06/03/2018       Counseling / Coordination of Care  Total floor / unit time spent today 25 minutes  Greater than 50% of total time was spent with the patient and / or family counseling and / or coordination of care  A description of the counseling / coordination of care: 15    Enoc Davila

## 2018-06-04 NOTE — PROGRESS NOTES
Progress Note - General Surgery   Wero Calixto 58 y o  male MRN: [id number]  Unit/Bed#: [address] [place] [address] 203-02 Encounter: [id number]    Assessment:  Epigastric abdominal pain-improving, patient currently denies pain  Symptomatic cholelithiasis without CT findings for cholecystitis,   -await gallbladder ultrasound to further delineate biliary system  -given patient's medical history including cirrhosis and varices patient would be at increased risk for surgical intervention   Transaminitis and hyperbilirubinemia- more likely related to liver disease than gallbladder disease- LFTs improved, bilirubin continues to rise  -continue to trend  MILLER with liver cirhosis with esophageal varices- GI following  -child's class A per Gastroenterology    If ultrasound negative for acute cholecystitis would advance diet and discharge patient  Patient should follow up with the surgical office as an outpatient to discuss symptomatic cholelithiasis and elective laparoscopic cholecystectomy  Subjective/Objective   Chief Complaint:  Abdominal pain    Subjective:  Patient admitted Saturday night with severe abdominal pain  Pain seemed to resolve yesterday  Patient denies any current abdominal pain  Tolerating clear liquid diet without nausea or vomiting  Out of bed and ambulating  Awaiting gallbladder ultrasound to be done shortly  Objective:     Blood pressure 144/80, pulse 63, temperature 97 9 °F (36 6 °C), temperature source Oral, resp  rate 17, height 5' 10" (1 778 m), weight 111 kg (245 lb 2 4 oz), SpO2 93 %  ,Body mass index is 35 18 kg/m²        Intake/Output Summary (Last 24 hours) at 06/04/18 1332  Last data filed at 06/04/18 0126   Gross per 24 hour   Intake             2225 ml   Output                0 ml   Net             2225 ml       Invasive Devices     Peripheral Intravenous Line            Peripheral IV 06/03/18 Left Antecubital 1 day                Physical Exam:  General appearance: alert and oriented, NAD  HEENT:  Sclerae anicteric, mucous membranes moist  Neck:   supple  No JVD noted  Trachea midline  Lungs: clear to auscultation bilaterally  Heart: regular rate and rhythm, S1, S2 normal, no murmur  Abdomen:   soft, nondistended, nontender, active bowel sounds no  Extremities: extremities normal, warm and well-perfused; no cyanosis, clubbing, or edema  Skin: Skin color, texture, turgor normal  No rashes or lesions  Mild jaundice      Lab, Imaging and other studies:  I have personally reviewed pertinent lab results    , CBC:   Lab Results   Component Value Date    WBC 6 58 06/04/2018    HGB 12 2 06/04/2018    HCT 38 2 06/04/2018    MCV 99 (H) 06/04/2018    PLT 94 (L) 06/04/2018    MCH 31 7 06/04/2018    MCHC 31 9 06/04/2018    RDW 14 8 06/04/2018    MPV 9 4 06/04/2018    NRBC 0 06/04/2018   , CMP:   Lab Results   Component Value Date     06/04/2018    K 3 7 06/04/2018     06/04/2018    CO2 24 06/04/2018    ANIONGAP 8 06/04/2018    BUN 9 06/04/2018    CREATININE 1 08 06/04/2018    GLUCOSE 105 06/04/2018    CALCIUM 7 5 (L) 06/04/2018    AST 45 06/04/2018    ALT 27 06/04/2018    ALKPHOS 82 06/04/2018    PROT 6 4 06/04/2018    BILITOT 2 40 (H) 06/04/2018    EGFR 72 06/04/2018     VTE Pharmacologic Prophylaxis: Heparin  VTE Mechanical Prophylaxis: sequential compression device     Merlyn Petty PA-C

## 2018-06-04 NOTE — SOCIAL WORK
Met with Pt  Pt presents AA&Ox3  Discussed role of   Pt lives alone in [address] [place] [address], no steps to enter  Pt is independent with adls and ambulation  Pt has no DME or VNA services  Pt drives  Pt goes to [facility]  Pt has friend and family support in area  No needs anticipated  Will follow

## 2018-06-04 NOTE — PROGRESS NOTES
Progress Note - Destiny Levin 58 y o  male MRN: [id number]    Unit/Bed#: [address] [place] [address] 203-02 Encounter: [id number]      Assessment:  Principal Problem:    Transaminitis  Active Problems:    Hyperbilirubinemia    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 ([address] )    Cholelithiasis    AAA (abdominal aortic aneurysm) without rupture (HCC)    Essential hypertension  Resolved Problems:    * No resolved hospital problems  *        Plan:  · Abdominal pain with transaminitis-and increasing bilirubin to 2 4 today- CT scan showed signs of cirrhosis as well as gallstones but no definite at estefani cystitis-GI following-patient for ultrasound of abdomen today-will decrease IV fluids as he is tolerating clear liquids and await those results  · Abdominal aortic aneurysm-status post endovascular stent appear stable on CT scan and CTA  · Hypertension    Subjective:   Patient without complaints of nausea or abdominal pain this morning  He is tolerating the clear liquids  No complaints of shortness of breath  ROS  Comprehensive system review negative other than noted above    Objective:     Vitals: Blood pressure 144/80, pulse 63, temperature 97 9 °F (36 6 °C), temperature source Oral, resp  rate 17, height 5' 10" (1 778 m), weight 111 kg (245 lb 2 4 oz), SpO2 93 %  ,Body mass index is 35 18 kg/m²    Current Facility-Administered Medications   Medication Dose Route Frequency Provider Last Rate Last Dose    heparin (porcine) subcutaneous injection 5,000 Units  5,000 Units Subcutaneous Q8H [address] ELIZABETH Orozco   5,000 Units at 06/04/18 0526    hydrochlorothiazide (HYDRODIURIL) tablet 25 mg  25 mg Oral Daily ELIZABETH Orozco   25 mg at 06/04/18 0804    lisinopril (ZESTRIL) tablet 20 mg  20 mg Oral Daily ELIZABETH Orozco   20 mg at 06/04/18 0804    ondansetron (ZOFRAN) injection 4 mg  4 mg Intravenous Q6H PRN ELIZABETH Teran   4 mg at 06/03/18 0915    piperacillin-tazobactam (ZOSYN) 4 5 g in sodium chloride 0 9 % 100 mL IVPB  4 5 g Intravenous Q6H ELIZABETH Orozco 200 mL/hr at 06/04/18 0804 4 5 g at 06/04/18 0804    sodium chloride 0 9 % infusion  125 mL/hr Intravenous Continuous ELIZABETH Orozco 125 mL/hr at 06/04/18 0126 125 mL/hr at 06/04/18 0126    zolpidem (AMBIEN) tablet 10 mg  10 mg Oral HS PRN ELIZABETH Bowman   5 mg at 06/03/18 2351     Prescriptions Prior to Admission   Medication    hydrochlorothiazide (HYDRODIURIL) 25 mg tablet    lisinopril (ZESTRIL) 20 mg tablet    Multiple Vitamin (MULTIVITAMIN) capsule    zolpidem (AMBIEN) 10 mg tablet         Intake/Output Summary (Last 24 hours) at 06/04/18 0820  Last data filed at 06/04/18 0126   Gross per 24 hour   Intake             2225 ml   Output                0 ml   Net             2225 ml       Physical Exam:  General appearance: alert and oriented, in no acute distress  Neck:  No JVD noted  No cervical lymphadenopathy  Lungs: clear to auscultation bilaterally  Heart: regular rate and rhythm, S1, S2 normal, no murmur, click, rub or gallop  Abdomen:  Overweight, no guarding or rigidity, active bowel sounds  Minimal fullness over the liver edge  Extremities: extremities normal, warm and well-perfused; no cyanosis, clubbing, or edema  Skin: Skin color, texture, turgor normal  No rashes or lesions  Neurologic: Grossly normal       Lab, Imaging and other studies: I have personally reviewed pertinent reports              Results from last 7 days  Lab Units 06/04/18  0520 06/03/18  0055   WBC Thousand/uL 6 58 5 99   HEMOGLOBIN g/dL 12 2 13 3   HEMATOCRIT % 38 2 39 5   PLATELETS Thousands/uL 94* 128*   NEUTROS PCT % 52 61   LYMPHS PCT % 32 23   MONOS PCT % 11 12   EOS PCT % 3 2       Results from last 7 days  Lab Units 06/04/18  0520 06/03/18  0055   SODIUM mmol/L 140 142   POTASSIUM mmol/L 3 7 3 4*   CHLORIDE mmol/L 108 106   CO2 mmol/L 24 23   BUN mg/dL 9 11   CREATININE mg/dL 1 08 1 08   CALCIUM mg/dL 7 5* 8 3   TOTAL PROTEIN g/dL 6 4 7 5   BILIRUBIN TOTAL mg/dL 2 40* 1 40*   ALK PHOS U/L 82 134*   ALT U/L 27 35   AST U/L 45 60*   GLUCOSE RANDOM mg/dL 105 163*     Lab Results   Component Value Date    TROPONINI 0 02 06/03/2018       Results from last 7 days  Lab Units 06/03/18  0055   INR  1 26*     No results found for: Riley Calhoun, SPUTUMCULTUR    Imaging:    Results for orders placed in visit on 04/09/18   XR chest pa & lateral    Narrative CHEST     INDICATION:   I71 4: Abdominal aortic aneurysm, without rupture  Preop  COMPARISON:  Chest and abdomen CT from 3/8/2018  EXAM PERFORMED/VIEWS:  XR CHEST PA & LATERAL      FINDINGS:    Cardiomediastinal silhouette appears unremarkable  The lungs are clear  No pneumothorax or pleural effusion  Osseous structures appear within normal limits for patient age  Impression No acute cardiopulmonary disease  Workstation performed: [id number]         PATIENT CENTERED ROUNDS: I have performed rounds with the nursing staff            Tres Callejas DO

## 2018-06-04 NOTE — CASE MANAGEMENT
Initial Clinical Review  Admission: Date/Time/Statement: 6/3/18 @ 0401   Orders Placed This Encounter   Procedures    Inpatient Admission (expected length of stay for this patient is greater than two midnights)     Standing Status:   Standing     Number of Occurrences:   1     Order Specific Question:   Admitting Physician     Answer:   Wade Gee [[id number]]     Order Specific Question:   Level of Care     Answer:   Med Surg [16]     Order Specific Question:   Estimated length of stay     Answer:   More than 2 Midnights     Order Specific Question:   Certification     Answer:   I certify that inpatient services are medically necessary for this patient for a duration of greater than two midnights  See H&P and MD Progress Notes for additional information about the patient's course of treatment  ED: Date/Time/Mode of Arrival:   ED Arrival Information     Expected Arrival Acuity Means of Arrival Escorted By Service Admission Type    - 6/3/2018 00:14 Emergent Walk-In Self General Medicine Emergency    Arrival Complaint    POSSIBLE FOOD POISONING      Chief Complaint:   Chief Complaint   Patient presents with    Abdominal Pain     Pt states abdominal pain  Denies V/D/N  Pt  states had chicken soup and thinks it might be food poisoning  History of Illness:   70-year-old man status post recent  Abdominal aortic EVAR  Complains of epigastric pain  This started after eating soup around 6:00 p m     This pain is been throbbing and continuous  It is progressively worsening  Radiates to his back  Briefly radiated upwards into the chest but that has resolved    He has not felt this pain in the past     ED Vital Signs:   ED Triage Vitals   Temperature Pulse Respirations Blood Pressure SpO2   06/03/18 0022 06/03/18 0022 06/03/18 0022 06/03/18 0025 06/03/18 0022   97 6 °F (36 4 °C) 61 18 (!) 199/94 94 %      Temp Source Heart Rate Source Patient Position - Orthostatic VS BP Location FiO2 (%)   06/03/18 0022 06/03/18 0022 06/03/18 0025 06/03/18 0025 --   Tympanic Monitor Lying Right arm       Pain Score       06/03/18 0022       8        Wt Readings from Last 1 Encounters:   06/04/18 111 kg (245 lb 2 4 oz)   Vital Signs (abnormal):   / 83, 173/87  Abnormal Labs/Diagnostic Test Results:    PT 15 1 INR 1 26 PTT 37 K 3 4 GLUC 163 TBILI 1 40 DBILI 0 44 ALK PHOS 134 AST 60 ALB 3 1   CTA A/P=1  Patent infrarenal aortic endograft  Stable excluded sac measuring 5 8 cm  No evidence of endoleak  2   Cirrhosis  Gastric and esophageal varices  No ascites  3   Collapsed gallbladder containing punctate gallstones in the neck  No clear evidence of acute cholecystitis or biliary obstruction  ED Treatment:   Medication Administration from 06/03/2018 0014 to 06/03/2018 0505       Date/Time Order Dose Route Action Action by Comments     06/03/2018 0112 pantoprazole (PROTONIX) injection 40 mg 40 mg Intravenous Given Scottie Cordova RN      06/03/2018 0119 aluminum-magnesium hydroxide-simethicone (MYLANTA) 200-200-20 mg/5 mL oral suspension 15 mL 15 mL Oral Given Scottie Cordova RN      06/03/2018 0120 lidocaine viscous (XYLOCAINE) 2 % mucosal solution 10 mL 10 mL Oral Given Scottie Cordova RN      06/03/2018 0145 HYDROmorphone (DILAUDID) injection 0 5 mg 0 5 mg Intravenous Given Scottie Cordova RN      06/03/2018 0256 HYDROmorphone (DILAUDID) injection 1 mg 1 mg Intravenous Given Scottie Cordova RN      06/03/2018 0329 iohexol (OMNIPAQUE) 350 MG/ML injection (SINGLE-DOSE) 100 mL 100 mL Intravenous Given Alexa Singh       Past Medical/Surgical History:    Active Ambulatory Problems     Diagnosis Date Noted    AAA (abdominal aortic aneurysm) without rupture ([facility] ) 03/01/2018     Resolved Ambulatory Problems     Diagnosis Date Noted    No Resolved Ambulatory Problems     Past Medical History:   Diagnosis Date    Abdominal aortic aneurysm (AAA) ([address] )     H/O shoulder surgery     Hard to intubate     Hypertension     Liver disease     MILLER (nonalcoholic steatohepatitis)    Admitting Diagnosis: Acute cholecystitis [K81 0]  Cholelithiasis [K80 20]  Abdominal pain [R10 9]  Age/Sex: 58 y o  male  Assessment/Plan:   Cholelithiasis with acute cholecystitis   Assessment & Plan     CT scan of abdomen shows cholelithiasis including a punctate calculus in the cystic duct  Gallbladder mildly distended  Will keep patient NPO  IV fluids for hydration  Will place inpatient consult to acute care surgery        Hyperbilirubinemia   Assessment & Plan     Most likely due to coli lithiasis  Will continue to monitor        Transaminitis   Assessment & Plan     Alk phos and AST slightly elevated  Most likely due to his non alcoholic liver disease         Essential hypertension   Assessment & Plan     BP elevated  Most likely pain related  Continue hydrochlorothiazide and lisinopril          AAA (abdominal aortic aneurysm) without rupture [facility])   Assessment & Plan     Patient is status post EVAR on 04/23/2018  CT scan of abdomen is negative for leak or dissection        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 [facility])   Assessment & Plan     Patient follows with Dr Jaja Cantu  from [address]  Next scheduled appointment is June 18th  Will place inpatient consult to GI     Admission Orders:  MED SURG  CONSULT SURGERY  CONSULT GI  DHARA  NPO  Scheduled Meds:   Current Facility-Administered Medications:  heparin (porcine) 5,000 Units Subcutaneous Q8H [address] ELIZABETH Orozco    hydrochlorothiazide 25 mg Oral Daily ELIZABETH Orozco    lisinopril 20 mg Oral Daily ELIZABETH Orozco    ondansetron 4 mg Intravenous Q6H PRN ELIZABETH Orozco    piperacillin-tazobactam 4 5 g Intravenous Q6H ELIZABETH Orozco Last Rate: 4 5 g (06/04/18 0804)   sodium chloride 50 mL/hr Intravenous Continuous Maye Fly, DO Last Rate: 50 mL/hr (06/04/18 0915)   zolpidem 10 mg Oral HS PRN ELIZABETH Juarez    Continuous Infusions:   sodium chloride 50 mL/hr Last Rate: 50 mL/hr (06/04/18 0915)   PRN Meds: ondansetron    zolpidem  Thank you,  [facility] the [facility] by Dontae Rich for 2017  Network Utilization Review Department  Phone: [phone]; Fax [phone]  ATTENTION: The Network Utilization Review Department is now centralized for our 7 Facilities  Make a note that we have a new phone and fax numbers for our Department  Please call with any questions or concerns to [phone] and carefully follow the prompts so that you are directed to the right person  All voicemails are confidential  Fax any determinations, approvals, denials, and requests for initial or continue stay review clinical to [phone]  Due to HIGH CALL volume, it would be easier if you could please send faxed requests to expedite your requests and in part, help us provide discharge notifications faster

## 2018-06-05 VITALS
TEMPERATURE: 98.9 F | DIASTOLIC BLOOD PRESSURE: 82 MMHG | HEIGHT: 70 IN | WEIGHT: 242.51 LBS | RESPIRATION RATE: 18 BRPM | BODY MASS INDEX: 34.72 KG/M2 | SYSTOLIC BLOOD PRESSURE: 135 MMHG | OXYGEN SATURATION: 92 % | HEART RATE: 58 BPM

## 2018-06-05 LAB
ALBUMIN SERPL BCP-MCNC: 2.6 G/DL (ref 3.5–5)
ALP SERPL-CCNC: 86 U/L (ref 46–116)
ALT SERPL W P-5'-P-CCNC: 33 U/L (ref 12–78)
AST SERPL W P-5'-P-CCNC: 48 U/L (ref 5–45)
BILIRUB DIRECT SERPL-MCNC: 0.47 MG/DL (ref 0–0.2)
BILIRUB SERPL-MCNC: 1.9 MG/DL (ref 0.2–1)
PROT SERPL-MCNC: 7.2 G/DL (ref 6.4–8.2)

## 2018-06-05 PROCEDURE — 80076 HEPATIC FUNCTION PANEL: CPT | Performed by: NURSE PRACTITIONER

## 2018-06-05 PROCEDURE — 99238 HOSP IP/OBS DSCHRG MGMT 30/<: CPT | Performed by: INTERNAL MEDICINE

## 2018-06-05 RX ADMIN — HEPARIN SODIUM 5000 UNITS: 5000 INJECTION, SOLUTION INTRAVENOUS; SUBCUTANEOUS at 05:39

## 2018-06-05 RX ADMIN — HYDROCHLOROTHIAZIDE 25 MG: 25 TABLET ORAL at 10:05

## 2018-06-05 RX ADMIN — LISINOPRIL 20 MG: 20 TABLET ORAL at 10:05

## 2018-06-05 NOTE — NURSING NOTE
Pt is of great spirits and stable  Took all medications and asked questions when appropriate  IV removed and pt dressed self  Pt to drive self home  This nurse sees no reason not to drive self  Pt verbalizes understanding and no complications noted

## 2018-06-05 NOTE — PLAN OF CARE
DISCHARGE PLANNING - CARE MANAGEMENT     Discharge to post-acute care or home with appropriate resources Progressing        GASTROINTESTINAL - ADULT     Minimal or absence of nausea and/or vomiting Progressing     Maintains or returns to baseline bowel function Progressing     Maintains adequate nutritional intake Progressing        PAIN - ADULT     Verbalizes/displays adequate comfort level or baseline comfort level Progressing

## 2018-06-05 NOTE — DISCHARGE SUMMARY
Discharge Summary  Andrei Landin 58 y o  male MRN: [id number]  Unit/Bed#: [address] [place] [address] 203-02 Encounter: [id number]    Admission Date:   Admission Orders     Ordered        06/03/18 0401  Inpatient Admission (expected length of stay for this patient is greater than two midnights)  Once               Discharge Date: 06/05/18    Admitting Diagnosis: Acute cholecystitis [K81 0]  Cholelithiasis [K80 20]  Abdominal pain [R10 9]    HPI:  See history and physical    Procedures Performed:   Orders Placed This Encounter   Procedures    ED ECG Documentation Only       Hospital Course:  Patient presented to the hospital with acute onset of abdominal pain  Patient was found to have gallstones  Patient has known history of fatty liver as well as cirrhosis  He is followed by GI  It was determined that he more than likely has symptomatic cholelithiasis but no evidence of cholecystitis  His bilirubin did bump somewhat but decreased  His felt he should have elective cholecystectomy  He would like to discuss this with his gastroenterologist with whom he has an appointment on the 18th of this month  We have recommended he also have a follow-up appoint with the surgeon so they can discuss this and come to decision on surgical date  Patient was tolerating a regular low-fat diet at time of discharge  Significant Findings, Care, Treatment and Services Provided:  None    Discharge Diagnosis: Principal Problem:    Cholelithiasis  Active Problems:    AAA (abdominal aortic aneurysm) without rupture (HCC)    Hyperbilirubinemia    Transaminitis    Essential hypertension    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 [facility])  Resolved Problems:    * No resolved hospital problems  *        Condition at Discharge: stable     Discharge instructions/Information to patient and family:   See after visit summary for information provided to patient and family        Provisions for Follow-Up Care:  See after visit summary for information related to follow-up care and any pertinent home health orders  Disposition: Home    Discharge Medications:  See after visit summary for reconciled discharge medications provided to patient and family  This note has been constructed using a voice recognition system         Brent Khan MD

## 2018-06-05 NOTE — PROGRESS NOTES
Mirna Resendiz  MR # [id number]  Unit/Bed#: [address] 203-02  58 y o   male      ASSESSMENT:  1  Biliary colic with CT and US showing cholelithiasis without choledocholithiasis  CBD not well seen on US but was normal on CT  LFT's elevated, but trending downward  Surgery involved and planning for Op elective procedure  Patient is tolerating diet  PLAN:  1  Plan is for patient to be discharged and follow up with [facility] (appointment already scheduled for 6/18) and with surgery for high-risk, elective cholecystectomy  2  Low fat diet  3  Advised if pain returns or he is unable to tolerate diet, develops nausea or vomiting or fever of jaundice, patient is to either call [facility] or present to ER  4  No plan for EGD at this time, but will follow need follow up for MILLER cirrhosis and varices seen on CT scan  Chief Complaint   Patient presents with    Abdominal Pain     Pt states abdominal pain  Denies V/D/N  Pt  states had chicken soup and thinks it might be food poisoning  SUBJECTIVE/HP:   Patient is awake sitting up in bed  He reports he tolerated his diet last shaun and is awaiting breakfast this morning  He denies pain, nausea or vomiting   Denies pruritus at this time  /82 (BP Location: Right arm)   Pulse 58   Temp 98 9 °F (37 2 °C) (Oral)   Resp 18   Ht 5' 10" (1 778 m)   Wt 110 kg (242 lb 8 1 oz)   SpO2 92%   BMI 34 80 kg/m²     PHYSICALEXAM  General appearance: alert, appears stated age and cooperative,    Head: Normocephalic, without obvious abnormality, atraumatic  Lungs: CTA  Heart: regular rate and rhythm, S1,S2, no murmur,gallop or rub  Abdomen: soft, non-tender; bowel sounds normal; no masses,  no organomegaly  Extremities: +0 edema BL   Neurologic: awake and alert, nonfocal     Lab Results   Component Value Date    GLUCOSE 105 06/04/2018    CALCIUM 7 5 (L) 06/04/2018     06/04/2018    K 3 7 06/04/2018    CO2 24 06/04/2018     06/04/2018    BUN 9 06/04/2018    CREATININE 1 08 06/04/2018     Lab Results   Component Value Date    WBC 6 58 06/04/2018    HGB 12 2 06/04/2018    HCT 38 2 06/04/2018    MCV 99 (H) 06/04/2018    PLT 94 (L) 06/04/2018     Lab Results   Component Value Date    ALT 33 06/05/2018    AST 48 (H) 06/05/2018    ALKPHOS 86 06/05/2018    BILITOT 1 90 (H) 06/05/2018     No results found for: AMYLASE  Lab Results   Component Value Date    LIPASE 227 06/04/2018     No results found for: IRON, TIBC, FERRITIN  Lab Results   Component Value Date    INR 1 26 (H) 06/03/2018   Mally Mcleandayna  MR # [id number]  Unit/Bed#: [address] [place] [address]  58 y o   male

## 2018-06-07 ENCOUNTER — OFFICE VISIT (OUTPATIENT)
Dept: SURGERY | Facility: HOSPITAL | Age: 63
End: 2018-06-07
Payer: COMMERCIAL

## 2018-06-07 VITALS
WEIGHT: 243.4 LBS | SYSTOLIC BLOOD PRESSURE: 112 MMHG | HEART RATE: 76 BPM | BODY MASS INDEX: 34.84 KG/M2 | RESPIRATION RATE: 16 BRPM | DIASTOLIC BLOOD PRESSURE: 68 MMHG | HEIGHT: 70 IN | TEMPERATURE: 99.4 F

## 2018-06-07 DIAGNOSIS — K80.20 CALCULUS OF GALLBLADDER WITHOUT CHOLECYSTITIS WITHOUT OBSTRUCTION: ICD-10-CM

## 2018-06-07 DIAGNOSIS — K42.9 UMBILICAL HERNIA WITHOUT OBSTRUCTION AND WITHOUT GANGRENE: Primary | ICD-10-CM

## 2018-06-07 PROCEDURE — 99243 OFF/OP CNSLTJ NEW/EST LOW 30: CPT | Performed by: SURGERY

## 2018-06-07 NOTE — LETTER
June 7, 2018     Terrie Elton, [address]  [address]  [place] [id number]    Patient: Teagan Magaña   YOB: 1955   Date of Visit: 6/7/2018       Dear Dr Rena Shelby:    Thank you for referring Samara Prieto to me for evaluation  Below are my notes for this consultation  If you have questions, please do not hesitate to call me  I look forward to following your patient along with you  Sincerely,        Elías Mina MD        CC: No Recipients  Cetin Nyle Hamman  6/7/2018  3:43 PM  Sign at close encounter  Assessment/Plan:  Teagan Magaña is a 58 y o  male who presents today, after being admitted to [address] from 6/3/18- 6/5/18 for acute abdominal pain secondary to cholelithiasis, to discuss cholecystectomy  On exam, his RUQ is non-tender  He has a small reducible umbilical hernia which will be repaired during his cholecystectomy  Discussed risks, benefits, and alternatives of laparoscopic cholecystectomy and open umbilical hernia repair without mesh along with pre- and post- operative protocol  Lifting restrictions of nothing greater than 15 lbs will be in place for weeks 1-2 after surgery  These will progress to 25 lbs during weeks 3-4  Light cardiovascular exercise may be resumed during weeks 3-4  He understands that his driving will be restricted while he is taking narcotic pain medication  He will schedule cholecystectomy for his earliest convenience  He will see his cardiologist to gain surgical clearance on 6/21/18  There are no diagnoses linked to this encounter  Subjective:      Patient ID: Teagan Magaña is a 58 y o  male who presents today, after being admitted to [address] from 6/3/18- 6/5/18 for acute abdominal pain secondary to cholelithiasis, to discuss cholecystectomy  He initially presented to the ER on 6/3/18 with severe RUQ pain radiating to his back   US and CTAP were performed on 6/3/18 identifying cholelithiasis without cholecystitis  CTAP also showed cirrhosis of the liver with prominent gastroesophageal varices  Since discharge on 6/5/18  He has reduced his fat and salt intake  He has felt well since discharge from the hospital  Denies any nausea, vomiting, fever, and chills  The following portions of the patient's history were reviewed and updated as appropriate: allergies, current medications, past family history, past medical history, past social history, past surgical history and problem list     Review of Systems   Constitutional: Negative  HENT: Negative  Eyes: Negative  Respiratory: Negative  Cardiovascular: Negative  Gastrointestinal: Negative  Endocrine: Negative  Genitourinary: Negative  Musculoskeletal: Negative  Skin: Negative  Allergic/Immunologic: Negative  Neurological: Negative  Hematological: Negative  Psychiatric/Behavioral: Negative  All other systems reviewed and are negative  Objective:      /68 (BP Location: Left arm, Patient Position: Sitting, Cuff Size: Standard)   Pulse 76   Temp 99 4 °F (37 4 °C) (Tympanic)   Resp 16   Ht 5' 10" (1 778 m)   Wt 110 kg (243 lb 6 4 oz)   BMI 34 92 kg/m²           Physical Exam   Constitutional: He is oriented to person, place, and time  He appears well-developed and well-nourished  No distress  Obese  HENT:   Head: Normocephalic and atraumatic  Right Ear: External ear normal    Left Ear: External ear normal    Nose: Nose normal    Mouth/Throat: Oropharynx is clear and moist  No oropharyngeal exudate  Eyes: Conjunctivae and EOM are normal  Right eye exhibits no discharge  Left eye exhibits no discharge  No scleral icterus  Neck: Normal range of motion  Neck supple  No JVD present  No tracheal deviation present  No thyromegaly present  Cardiovascular: Normal rate, regular rhythm, normal heart sounds and intact distal pulses  Exam reveals no gallop and no friction rub  No murmur heard  Pulmonary/Chest: Effort normal and breath sounds normal  No stridor  No respiratory distress  He has no wheezes  He has no rales  He exhibits no tenderness  Abdominal: Soft  Bowel sounds are normal  He exhibits no distension and no mass  There is no tenderness  There is no rebound and no guarding  Non-tender RUQ  Small reducible umbilical hernia  Musculoskeletal: Normal range of motion  He exhibits no edema, tenderness or deformity  Lymphadenopathy:     He has no cervical adenopathy  Neurological: He is alert and oriented to person, place, and time  No cranial nerve deficit  Coordination normal    Skin: Skin is dry  No rash noted  He is not diaphoretic  No erythema  No pallor  Psychiatric: He has a normal mood and affect  His behavior is normal  Thought content normal    Nursing note and vitals reviewed  By signing my name below, Genny Cedeno, attest that this documentation has been prepared under the direction and in the presence of Leonarda Castelan MD  Electonically Signed: [address] 6/7/2018      I, Leonarda Castelan, personally performed the services described in this documenation  All medical record entries made by the scribe were at my direction and in my presence  I have reviewed the chart and discharge instructions (if applicable) and agree that the record reflects my personal performance and is accurate and complete  Leonarda Castelan MD  6/7/2018

## 2018-06-07 NOTE — PROGRESS NOTES
Assessment/Plan:  Jaswinder Scott is a 58 y o  male who presents today, after being admitted to [address] from 6/3/18- 6/5/18 for acute abdominal pain secondary to cholelithiasis, to discuss cholecystectomy  On exam, his RUQ is non-tender  He has a small reducible umbilical hernia which will be repaired during his cholecystectomy  Discussed risks, benefits, and alternatives of laparoscopic cholecystectomy and open umbilical hernia repair without mesh along with pre- and post- operative protocol  Lifting restrictions of nothing greater than 15 lbs will be in place for weeks 1-2 after surgery  These will progress to 25 lbs during weeks 3-4  Light cardiovascular exercise may be resumed during weeks 3-4  He understands that his driving will be restricted while he is taking narcotic pain medication  He will schedule cholecystectomy for his earliest convenience  He will see his cardiologist to gain surgical clearance on 6/21/18  There are no diagnoses linked to this encounter  Subjective:      Patient ID: Jaswinder Scott is a 58 y o  male who presents today, after being admitted to [address] from 6/3/18- 6/5/18 for acute abdominal pain secondary to cholelithiasis, to discuss cholecystectomy  He initially presented to the ER on 6/3/18 with severe RUQ pain radiating to his back  US and CTAP were performed on 6/3/18 identifying cholelithiasis without cholecystitis  CTAP also showed cirrhosis of the liver with prominent gastroesophageal varices  Since discharge on 6/5/18  He has reduced his fat and salt intake  He has felt well since discharge from the hospital  Denies any nausea, vomiting, fever, and chills         The following portions of the patient's history were reviewed and updated as appropriate: allergies, current medications, past family history, past medical history, past social history, past surgical history and problem list     Review of Systems Constitutional: Negative  HENT: Negative  Eyes: Negative  Respiratory: Negative  Cardiovascular: Negative  Gastrointestinal: Negative  Endocrine: Negative  Genitourinary: Negative  Musculoskeletal: Negative  Skin: Negative  Allergic/Immunologic: Negative  Neurological: Negative  Hematological: Negative  Psychiatric/Behavioral: Negative  All other systems reviewed and are negative  Objective:      /68 (BP Location: Left arm, Patient Position: Sitting, Cuff Size: Standard)   Pulse 76   Temp 99 4 °F (37 4 °C) (Tympanic)   Resp 16   Ht 5' 10" (1 778 m)   Wt 110 kg (243 lb 6 4 oz)   BMI 34 92 kg/m²          Physical Exam   Constitutional: He is oriented to person, place, and time  He appears well-developed and well-nourished  No distress  Obese  HENT:   Head: Normocephalic and atraumatic  Right Ear: External ear normal    Left Ear: External ear normal    Nose: Nose normal    Mouth/Throat: Oropharynx is clear and moist  No oropharyngeal exudate  Eyes: Conjunctivae and EOM are normal  Right eye exhibits no discharge  Left eye exhibits no discharge  No scleral icterus  Neck: Normal range of motion  Neck supple  No JVD present  No tracheal deviation present  No thyromegaly present  Cardiovascular: Normal rate, regular rhythm, normal heart sounds and intact distal pulses  Exam reveals no gallop and no friction rub  No murmur heard  Pulmonary/Chest: Effort normal and breath sounds normal  No stridor  No respiratory distress  He has no wheezes  He has no rales  He exhibits no tenderness  Abdominal: Soft  Bowel sounds are normal  He exhibits no distension and no mass  There is no tenderness  There is no rebound and no guarding  Non-tender RUQ  Small reducible umbilical hernia  Musculoskeletal: Normal range of motion  He exhibits no edema, tenderness or deformity  Lymphadenopathy:     He has no cervical adenopathy     Neurological: He is alert and oriented to person, place, and time  No cranial nerve deficit  Coordination normal    Skin: Skin is dry  No rash noted  He is not diaphoretic  No erythema  No pallor  Psychiatric: He has a normal mood and affect  His behavior is normal  Thought content normal    Nursing note and vitals reviewed  By signing my name below, Mike Adler, attest that this documentation has been prepared under the direction and in the presence of Kwabena Spencer MD  Electonically Signed: Aidan Anasriford, [address] [place] [address] 6/7/2018      I, Kwabena Spencer, personally performed the services described in this documenation  All medical record entries made by the scribe were at my direction and in my presence  I have reviewed the chart and discharge instructions (if applicable) and agree that the record reflects my personal performance and is accurate and complete  Kwabena Spencer MD  6/7/2018

## 2018-06-12 NOTE — CASE MANAGEMENT
Notification of Inpatient Admission/Inpatient Authorization Request  This is a Notification of Inpatient Admission/Request for Inpatient Authorization to our facility [address]  Please be advised that this patient is currently in our facility under Inpatient Status  Below you will find the Attending Physician and Facilitys information including NPI# and contact information for the Utilization  assigned to the [facility] where the patient is receiving services  Please feel free to contact the Utilization Review Department with any questions  Patient Information:  PATIENT NAME: Chris Matta  MRN: [id number]  YOB: 1955    PRESENTATION DATE: 6/3/2018 12:19 AM  IP ADMISSION DATE: 6/3/18 0401  DISCHARGE DATE: 6/5/2018 11:22 AM   DISPOSITION: Home/Self Care    Attending Physician: CARLITO Bellamy  Specialty- Internal Medicine  [facility] ID- [id number]  [address]  Phone 1: [phone]  Fax: [phone]   Initial Clinical Review  Admission: Date/Time/Statement: 6/3/18 @ 0401   Orders Placed This Encounter   Procedures    Inpatient Admission (expected length of stay for this patient is greater than two midnights)     Standing Status:   Standing     Number of Occurrences:   1     Order Specific Question:   Admitting Physician     Answer:   Nohemi Evans [[id number]]     Order Specific Question:   Level of Care     Answer:   Med Surg [16]     Order Specific Question:   Estimated length of stay     Answer:   More than 2 Midnights     Order Specific Question:   Certification     Answer:   I certify that inpatient services are medically necessary for this patient for a duration of greater than two midnights  See H&P and MD Progress Notes for additional information about the patient's course of treatment     ED: Date/Time/Mode of Arrival:   ED Arrival Information     Expected Arrival Acuity Means of Arrival Escorted By Service Admission Type    - 6/3/2018 00:14 Emergent Walk-In Self General Medicine Emergency    Arrival Complaint    POSSIBLE FOOD POISONING      Chief Complaint:   Chief Complaint   Patient presents with    Abdominal Pain     Pt states abdominal pain  Denies V/D/N  Pt  states had chicken soup and thinks it might be food poisoning  History of Illness:   16-year-old man status post recent  Abdominal aortic EVAR  Complains of epigastric pain  This started after eating soup around 6:00 p m     This pain is been throbbing and continuous  It is progressively worsening  Radiates to his back  Briefly radiated upwards into the chest but that has resolved  He has not felt this pain in the past     ED Vital Signs:   ED Triage Vitals   Temperature Pulse Respirations Blood Pressure SpO2   06/03/18 0022 06/03/18 0022 06/03/18 0022 06/03/18 0025 06/03/18 0022   97 6 °F (36 4 °C) 61 18 (!) 199/94 94 %      Temp Source Heart Rate Source Patient Position - Orthostatic VS BP Location FiO2 (%)   06/03/18 0022 06/03/18 0022 06/03/18 0025 06/03/18 0025 --   Tympanic Monitor Lying Right arm       Pain Score       06/03/18 0022       8          Wt Readings from Last 1 Encounters:   06/07/18 110 kg (243 lb 6 4 oz)   Vital Signs (abnormal):   / 83, 173/87  Abnormal Labs/Diagnostic Test Results:    PT 15 1 INR 1 26 PTT 37 K 3 4 GLUC 163 TBILI 1 40 DBILI 0 44 ALK PHOS 134 AST 60 ALB 3 1   CTA A/P=1  Patent infrarenal aortic endograft  Stable excluded sac measuring 5 8 cm  No evidence of endoleak  2   Cirrhosis  Gastric and esophageal varices  No ascites  3   Collapsed gallbladder containing punctate gallstones in the neck    No clear evidence of acute cholecystitis or biliary obstruction  ED Treatment:   Medication Administration from 06/03/2018 0014 to 06/03/2018 0505       Date/Time Order Dose Route Action Action by Comments     06/03/2018 0112 pantoprazole (PROTONIX) injection 40 mg 40 mg Intravenous Given Romelia Norwood RN      06/03/2018 0119 aluminum-magnesium hydroxide-simethicone (MYLANTA) 200-200-20 mg/5 mL oral suspension 15 mL 15 mL Oral Given Romelia Norwood RN      06/03/2018 0120 lidocaine viscous (XYLOCAINE) 2 % mucosal solution 10 mL 10 mL Oral Given Romelia Norwood RN      06/03/2018 0145 HYDROmorphone (DILAUDID) injection 0 5 mg 0 5 mg Intravenous Given Romelia Norwood RN      06/03/2018 0256 HYDROmorphone (DILAUDID) injection 1 mg 1 mg Intravenous Given Romelia Norwood RN      06/03/2018 0329 iohexol (OMNIPAQUE) 350 MG/ML injection (SINGLE-DOSE) 100 mL 100 mL Intravenous Given Sergio Mejia       Past Medical/Surgical History: Active Ambulatory Problems     Diagnosis Date Noted    AAA (abdominal aortic aneurysm) without rupture ([facility] [address] ) 03/01/2018    Calculus of gallbladder without cholecystitis without obstruction 62/85/1274    Umbilical hernia without obstruction and without gangrene 06/07/2018     Resolved Ambulatory Problems     Diagnosis Date Noted    No Resolved Ambulatory Problems     Past Medical History:   Diagnosis Date    Abdominal aortic aneurysm (AAA) ([facility] [address] )     H/O shoulder surgery     Hard to intubate     Hypertension     Liver disease     MILLER (nonalcoholic steatohepatitis)    Admitting Diagnosis: Acute cholecystitis [K81 0]  Cholelithiasis [K80 20]  Abdominal pain [R10 9]  Age/Sex: 58 y o  male  Assessment/Plan:   Cholelithiasis with acute cholecystitis   Assessment & Plan     CT scan of abdomen shows cholelithiasis including a punctate calculus in the cystic duct  Gallbladder mildly distended  Will keep patient NPO  IV fluids for hydration  Will place inpatient consult to acute care surgery        Hyperbilirubinemia   Assessment & Plan     Most likely due to coli lithiasis  Will continue to monitor        Transaminitis   Assessment & Plan     Alk phos and AST slightly elevated    Most likely due to his non alcoholic liver disease         Essential hypertension Assessment & Plan     BP elevated  Most likely pain related  Continue hydrochlorothiazide and lisinopril          AAA (abdominal aortic aneurysm) without rupture [facility])   Assessment & Plan     Patient is status post EVAR on 04/23/2018  CT scan of abdomen is negative for leak or dissection        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 [facility])   Assessment & Plan     Patient follows with Dr Stanford Hung  from [address]  Next scheduled appointment is June 18th  Will place inpatient consult to GI  Admission Orders:  MED SURG  CONSULT SURGERY  CONSULT GI  VENODYNES  NPO  Scheduled Meds: Continuous Infusions:   No current facility-administered medications for this encounter  PRN Meds: Thank you,  [facility] in the [facility] by Dontae Rich for 2017  Network Utilization Review Department  Phone: [phone]; Fax [phone]  ATTENTION: The Network Utilization Review Department is now centralized for our 7 Facilities  Make a note that we have a new phone and fax numbers for our Department  Please call with any questions or concerns to [phone] and carefully follow the prompts so that you are directed to the right person  All voicemails are confidential  Fax any determinations, approvals, denials, and requests for initial or continue stay review clinical to [phone]  Due to HIGH CALL volume, it would be easier if you could please send faxed requests to expedite your requests and in part, help us provide discharge notifications faster  Facility:  [address] [place] [address] [facility], [address] [place] [address] Road  [phone]  Tax ID: [id number]  NPI: [id number]    [address] [place] [address] in [facility] by Dontae Rich for 2017  Network Utilization Review Department  Phone: [phone];  Fax [phone]  ATTENTION: The Network Utilization Review Department is now centralized for our  Facilities  Make a note that we have a new phone and fax numbers for our Department  Please call with any questions or concerns to [phone] and carefully follow the prompts so that you are directed to the right person  All voicemails are confidential  Fax any determinations, approvals, denials, and requests for initial or continue stay review clinical to [phone]  Due to HIGH CALL volume, it would be easier if you could please send faxed requests to expedite your requests and in part, help us provide discharge notifications faster

## 2018-06-18 ENCOUNTER — OFFICE VISIT (OUTPATIENT)
Dept: VASCULAR SURGERY | Facility: CLINIC | Age: 63
End: 2018-06-18

## 2018-06-18 VITALS
HEIGHT: 70 IN | TEMPERATURE: 99.1 F | WEIGHT: 232 LBS | BODY MASS INDEX: 33.21 KG/M2 | SYSTOLIC BLOOD PRESSURE: 130 MMHG | DIASTOLIC BLOOD PRESSURE: 76 MMHG

## 2018-06-18 DIAGNOSIS — I71.4 AAA (ABDOMINAL AORTIC ANEURYSM) WITHOUT RUPTURE (HCC): Primary | Chronic | ICD-10-CM

## 2018-06-18 PROCEDURE — 99024 POSTOP FOLLOW-UP VISIT: CPT | Performed by: SURGERY

## 2018-06-18 NOTE — LETTER
June 18, 2018     Ingris Fragoso, [address]  [address]  [facility] [address]    Patient: Elisha Vasquez   YOB: 1955   Date of Visit: 6/18/2018       Dear Dr Lennox Foyer:    Thank you for referring Jett Lopez to me for evaluation  Below are the relevant portions of my assessment and plan of care  AAA (abdominal aortic aneurysm) without rupture [facility])  Doing well following endovascular repair of abdominal aortic aneurysm  Will plan standard post EVAR  follow-up  If you have questions, please do not hesitate to call me  I look forward to following Abhishek Barillas along with you           Sincerely,        Jose Valerio MD        CC: No Recipients

## 2018-06-18 NOTE — PATIENT INSTRUCTIONS
AAA (abdominal aortic aneurysm) without rupture [facility])  Doing well following endovascular repair of abdominal aortic aneurysm  Will plan standard post EVAR  follow-up

## 2018-06-18 NOTE — PROGRESS NOTES
Assessment/Plan:    AAA (abdominal aortic aneurysm) without rupture ([facility])  Doing well following endovascular repair of abdominal aortic aneurysm  Will plan standard post EVAR  follow-up  Diagnoses and all orders for this visit:    AAA (abdominal aortic aneurysm) without rupture [facility])               Patient ID: Herb Vega is a 58 y o  male  Chief complaint: Pt is here to review CTA of abd on 6/1/18  Pt denies abd/ lower back pain  Pt states he is feeling much better since sx  Denies swelling or drainage  59-year-old approximately 6 weeks status post endovascular repair of abdominal aortic aneurysm  From a vascular standpoint he has no complaints following aneurysm repair  He has returned to a normal level of activity  He denies any claudication symptoms  He does have some abdominal discomfort which has been diagnosed as cholelithiasis for which he is scheduled for cholecystectomy within the next few weeks  CT angiogram 06/01/2018 shows good position of endovascular stent graft with no evidence of endoleak and some decrease in aneurysm size from 6 cm to 5 8 cm  The following portions of the patient's history were reviewed and updated as appropriate: allergies, current medications, past family history, past medical history, past social history, past surgical history and problem list     Review of Systems   Constitutional: Negative  HENT: Negative  Eyes: Negative  Respiratory: Positive for cough  Cardiovascular: Negative  Gastrointestinal: Negative  Endocrine: Negative  Genitourinary: Negative  Musculoskeletal: Negative  Skin: Negative  Allergic/Immunologic: Positive for environmental allergies  Neurological: Negative  Hematological: Negative  Psychiatric/Behavioral: Negative            Objective:      /76 (BP Location: Right arm, Patient Position: Sitting, Cuff Size: Adult)   Temp 99 1 °F (37 3 °C) (Tympanic)   Ht 5' 10" (1 778 m)   Wt 105 kg (232 lb) Comment: per pt  BMI 33 29 kg/m²          Physical Exam

## 2018-06-18 NOTE — ASSESSMENT & PLAN NOTE
Doing well following endovascular repair of abdominal aortic aneurysm  Will plan standard post EVAR  follow-up

## 2018-06-19 ENCOUNTER — ANESTHESIA (OUTPATIENT)
Dept: ANESTHESIOLOGY | Facility: HOSPITAL | Age: 63
End: 2018-06-19

## 2018-06-19 ENCOUNTER — ANESTHESIA EVENT (OUTPATIENT)
Dept: PERIOP | Facility: HOSPITAL | Age: 63
End: 2018-06-19
Payer: COMMERCIAL

## 2018-06-19 ENCOUNTER — ANESTHESIA EVENT (OUTPATIENT)
Dept: ANESTHESIOLOGY | Facility: HOSPITAL | Age: 63
End: 2018-06-19

## 2018-06-19 NOTE — PRE-PROCEDURE INSTRUCTIONS
Pre-Surgery Instructions:   Medication Instructions    hydrochlorothiazide (HYDRODIURIL) 25 mg tablet Instructed patient per Anesthesia Guidelines   lisinopril (ZESTRIL) 20 mg tablet Instructed patient per Anesthesia Guidelines   Multiple Vitamin (MULTIVITAMIN) capsule Instructed patient per Anesthesia Guidelines   zolpidem (AMBIEN) 10 mg tablet Instructed patient per Anesthesia Guidelines  Pre-op Showering Instructions for Surgery Patients    Before your operation, you play an important role in decreasing your risk for infection by washing with special antiseptic soap  This is an effective way to reduce bacteria on the skin which may help to prevent infections at the surgical site  Please read the following directions in advance  1  In the week before your operation, purchase a 4 ounce bottle of antiseptic soap containing chlorhexidine gluconate (CHG)  4%  Some brand names include: Aplicare®, Endure, and Hibiclens®  The cost is usually less than $5 00   For your convenience, the [address] [place] [address] carries the soap   It may also be available at your doctors office or pre-admission testing center, and at most retail pharmacies   If you are allergic or sensitive to soaps containing CHG, please let your doctor know so another antiseptic can be suggested   CHG antiseptic soap is for external use only  2   The day before your operation, follow these instructions carefully to get ready   Please clean linens (sheets) on your bed; you should sleep on clean sheets after your evening shower   Get clean towels and washcloth ready - you need enough for 2 showers   Set aside clean underwear, pajamas, and clothing to wear after the showers     Reminders:   DO NOT use any other soap or body rinse on your skin during or after the antiseptic showers   DO NOT use lotion, powder, deodorant, or perfume/aftershave of any kind on your skin after your antiseptic shower   DO NOT shave any body parts in the 24 hours/day before your operation   DO NOT get the antiseptic soap in your eyes, ears, nose, mouth, or vaginal area    3  You will need to shower the night before AND the morning of your surgery  Shower 1:   The first evening before the operation, take the first shower   First, shampoo your hair with regular shampoo and rinse it completely before you use the antiseptic soap  After washing and rinsing your hair, rinse your body   Next, use a clean washcloth to apply the antiseptic soap and wash your body from the neck down to your toes using ½ bottle of the antiseptic soap   You will use the other ½ bottle for the second shower   Clean the area where your incision will be; lather this area well for about 2 minutes   If you are having head or neck surgery, wash areas with the antiseptic soap   Rinse yourself completely with running water   Use a clean towel to dry off   Wear clean underwear and clothing/pajamas  Shower 2   The morning of your operation, take the second shower following the same steps as Shower 1 using the second ½ of the bottle of antiseptic soap   Use clean cloths and towels to wash and dry yourself   Wear clean underwear and clothing

## 2018-06-20 RX ORDER — SODIUM CHLORIDE, SODIUM LACTATE, POTASSIUM CHLORIDE, CALCIUM CHLORIDE 600; 310; 30; 20 MG/100ML; MG/100ML; MG/100ML; MG/100ML
125 INJECTION, SOLUTION INTRAVENOUS CONTINUOUS
Status: CANCELLED | OUTPATIENT
Start: 2018-06-26 | End: 2018-06-26

## 2018-06-21 ENCOUNTER — OFFICE VISIT (OUTPATIENT)
Dept: CARDIOLOGY CLINIC | Facility: CLINIC | Age: 63
End: 2018-06-21
Payer: COMMERCIAL

## 2018-06-21 VITALS
WEIGHT: 244.6 LBS | RESPIRATION RATE: 12 BRPM | SYSTOLIC BLOOD PRESSURE: 150 MMHG | HEIGHT: 70 IN | DIASTOLIC BLOOD PRESSURE: 94 MMHG | HEART RATE: 60 BPM | BODY MASS INDEX: 35.02 KG/M2

## 2018-06-21 DIAGNOSIS — I70.90 ATHEROSCLEROSIS: Primary | ICD-10-CM

## 2018-06-21 PROCEDURE — 99214 OFFICE O/P EST MOD 30 MIN: CPT | Performed by: INTERNAL MEDICINE

## 2018-06-21 RX ORDER — LORAZEPAM 0.5 MG/1
TABLET ORAL
COMMUNITY
End: 2019-03-05 | Stop reason: ALTCHOICE

## 2018-06-21 RX ORDER — CITALOPRAM 40 MG/1
TABLET ORAL EVERY 24 HOURS
COMMUNITY
End: 2020-09-10 | Stop reason: ALTCHOICE

## 2018-06-21 RX ORDER — ALBUTEROL SULFATE 90 UG/1
AEROSOL, METERED RESPIRATORY (INHALATION)
COMMUNITY
Start: 2016-10-10 | End: 2019-10-23 | Stop reason: ALTCHOICE

## 2018-06-21 RX ORDER — SILDENAFIL CITRATE 20 MG/1
TABLET ORAL EVERY 24 HOURS
COMMUNITY
Start: 2015-06-04

## 2018-06-21 NOTE — PROGRESS NOTES
[address] [facility] [place]  [address] [facility]  [phone]    Cardiology Follow up    Patient:  Mario Shay  :  1955  MRN:  [id number]    History of Present Illness:     ***    Patient Active Problem List   Diagnosis    AAA (abdominal aortic aneurysm) without rupture ([facility] )    Hyperbilirubinemia    Transaminitis    Essential hypertension    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    Cholelithiasis    Calculus of gallbladder without cholecystitis without obstruction    Umbilical hernia without obstruction and without gangrene       Past Surgical History  Past Surgical History:   Procedure Laterality Date    COLON SURGERY      colonoscopy    HERNIA REPAIR      KNEE ARTHROSCOPY      OR EVASC RPR DPLMNT AORTO-AORTIC NDGFT N/A 2018    Procedure: REPAIR ANEURYSM ENDOVASCULAR ABDOMINAL AORTIC  (EVAR); Surgeon: Pierre Dudley MD;  [facility];  Service: Vascular    ROTATOR CUFF REPAIR Right        Social History   Social History     Social History    Marital status: Single     Spouse name: N/A    Number of children: N/A    Years of education: N/A     Occupational History    Not on file       Social History Main Topics    Smoking status: Former Smoker     Quit date:     Smokeless tobacco: Never Used    Alcohol use Yes      Comment: Ocassional, 1 beer a month    Drug use: No    Sexual activity: Not on file     Other Topics Concern    Not on file     Social History Narrative    No narrative on file        No Active Allergies    Family History   Family History   Problem Relation Age of Onset    Pancreatic cancer Mother     Diabetes Mother     Cancer Father     Diabetes Father        Review of Systems:  Review of Systems      Current Outpatient Prescriptions:     hydrochlorothiazide (HYDRODIURIL) 25 mg tablet, Take 25 mg by mouth daily As needed , Disp: , Rfl:     lisinopril (ZESTRIL) 20 mg tablet, Take 1 tablet (20 mg total) by mouth daily, Disp: , Rfl: 0    LORazepam (ATIVAN) 0 5 mg tablet, 1 tablet, Disp: , Rfl:     Multiple Vitamin (MULTIVITAMIN) capsule, Take 1 capsule by mouth daily, Disp: , Rfl:     sildenafil (REVATIO) 20 mg tablet, every 24 hours, Disp: , Rfl:     zolpidem (AMBIEN) 10 mg tablet, Take 10 mg by mouth daily at bedtime as needed  , Disp: , Rfl:     albuterol (PROAIR HFA) 90 mcg/act inhaler, 6 (six) times a day, Disp: , Rfl:     citalopram (CeleXA) 40 mg tablet, every 24 hours, Disp: , Rfl:      Physical Exam:    Vitals:    06/21/18 0854   BP: 150/94   Pulse: 60   Resp: 12   Weight: 111 kg (244 lb 9 6 oz)   Height: 5' 10" (1 778 m)       Physical Exam    Labs:{Recent GXLL:91893::"WFE applicable"}    Assessment/Plan:    ***      Sandra Burks MD  6/21/2018   9:15 AM

## 2018-06-21 NOTE — LETTER
2018     Aline Garcia, [address]  [address]  [facility] [address]    Patient: Pao [address]   YOB: 1955   Date of Visit: 2018       Dear Dr Melissa Forbes:    Thank you for referring Muna Shearer to me for evaluation  Below are my notes for this consultation  If you have questions, please do not hesitate to call me  I look forward to following your patient along with you  Sincerely,        Vasquez Vogel MD        CC: No Recipients  Vasquez Vogel MD  2018  9:21 AM  Sign at close encounter  [address] [facility] AT [place] [address] [address] [place] [address]  [phone]    Cardiology Follow up    Patient:  Pao [address]  :  1955  MRN:  [id number]    History of Present Illness:     ***    Patient Active Problem List   Diagnosis    AAA (abdominal aortic aneurysm) without rupture ([facility] )    Hyperbilirubinemia    Transaminitis    Essential hypertension    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    Cholelithiasis    Calculus of gallbladder without cholecystitis without obstruction    Umbilical hernia without obstruction and without gangrene       Past Surgical History  Past Surgical History:   Procedure Laterality Date    COLON SURGERY      colonoscopy    HERNIA REPAIR      KNEE ARTHROSCOPY      [place] EVASC RPR DPLMNT AORTO-AORTIC NDGFT N/A 2018    Procedure: REPAIR ANEURYSM ENDOVASCULAR ABDOMINAL AORTIC  (EVAR); Surgeon: Osito Lazo MD;  [facility];  Service: Vascular    ROTATOR CUFF REPAIR Right        Social History   Social History     Social History    Marital status: Single     Spouse name: N/A    Number of children: N/A    Years of education: N/A     Occupational History    Not on file       Social History Main Topics    Smoking status: Former Smoker     Quit date:     Smokeless tobacco: Never Used    Alcohol use Yes      Comment: Ocassional, 1 beer a month    Drug use: No    Sexual activity: Not on file Other Topics Concern    Not on file     Social History Narrative    No narrative on file        No Active Allergies    Family History   Family History   Problem Relation Age of Onset    Pancreatic cancer Mother     Diabetes Mother     Cancer Father     Diabetes Father        Review of Systems:  Review of Systems      Current Outpatient Prescriptions:     hydrochlorothiazide (HYDRODIURIL) 25 mg tablet, Take 25 mg by mouth daily As needed , Disp: , Rfl:     lisinopril (ZESTRIL) 20 mg tablet, Take 1 tablet (20 mg total) by mouth daily, Disp: , Rfl: 0    LORazepam (ATIVAN) 0 5 mg tablet, 1 tablet, Disp: , Rfl:     Multiple Vitamin (MULTIVITAMIN) capsule, Take 1 capsule by mouth daily, Disp: , Rfl:     sildenafil (REVATIO) 20 mg tablet, every 24 hours, Disp: , Rfl:     zolpidem (AMBIEN) 10 mg tablet, Take 10 mg by mouth daily at bedtime as needed  , Disp: , Rfl:     albuterol (PROAIR HFA) 90 mcg/act inhaler, 6 (six) times a day, Disp: , Rfl:     citalopram (CeleXA) 40 mg tablet, every 24 hours, Disp: , Rfl:      Physical Exam:    Vitals:    18 0854   BP: 150/94   Pulse: 60   Resp: 12   Weight: 111 kg (244 lb 9 6 oz)   Height: 5' 10" (1 778 m)       Physical Exam    Labs:{Recent [facility]:81852::"LPJ applicable"}    Assessment/Plan:    ***      Altagracia Aguilera MD  2018  9:15 AM    Altagracia Aguilera MD  2018  9:28 AM  Sign at close encounter  [facility] AT [place] [address], [address] [place] [address]  [phone]    Cardiology Follow up    Patient:  Reggie Boss  :  1955  MRN:  [id number]    History of Present Illness:     74-year-old man with past medical history of hypertension abdominal aortic aneurysm status post percutaneous repair, and cirrhosis with gastroesophageal varices noted on CT scan-etiology of cirrhosis appears to be MILLER presents for evaluation prior to gallbladder surgery next Tuesday      He denies any chest pain, shortness of breath, palpitations, dizziness, or syncope  He feels better since his abdominal aortic repair  He works for [employer] in his job is ending in the fall  He continues to be  Patient Active Problem List   Diagnosis    AAA (abdominal aortic aneurysm) without rupture (HCC)    Hyperbilirubinemia    Transaminitis    Essential hypertension    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 ([address] )    Cholelithiasis    Calculus of gallbladder without cholecystitis without obstruction    Umbilical hernia without obstruction and without gangrene       Past Surgical History  Past Surgical History:   Procedure Laterality Date    COLON SURGERY      colonoscopy    HERNIA REPAIR      KNEE ARTHROSCOPY      [place] EVASC RPR DPLMNT AORTO-AORTIC NDGFT N/A 4/23/2018    Procedure: REPAIR ANEURYSM ENDOVASCULAR ABDOMINAL AORTIC  (EVAR); Surgeon: Steffen Gorman MD;  [facility];  Service: Vascular    ROTATOR CUFF REPAIR Right        Social History   Social History     Social History    Marital status: Single     Spouse name: N/A    Number of children: N/A    Years of education: N/A     Occupational History    Not on file  Social History Main Topics    Smoking status: Former Smoker     Quit date: 1990    Smokeless tobacco: Never Used    Alcohol use Yes      Comment: Ocassional, 1 beer a month    Drug use: No    Sexual activity: Not on file     Other Topics Concern    Not on file     Social History Narrative    No narrative on file        Allergies   Allergen Reactions    Iodinated Diagnostic Agents      Other reaction(s): Unknown       Family History   Family History   Problem Relation Age of Onset    Pancreatic cancer Mother     Diabetes Mother     Cancer Father     Diabetes Father        Review of Systems:  Review of Systems   Constitutional: Negative for chills, fatigue and fever  HENT: Negative for hearing loss and trouble swallowing  Eyes: Negative for pain     Respiratory: Negative for cough, chest tightness and shortness of breath  Cardiovascular: Negative for chest pain, palpitations and leg swelling  Gastrointestinal: Negative for abdominal pain, blood in stool, nausea and vomiting  Endocrine: Negative for cold intolerance and heat intolerance  Genitourinary: Negative for difficulty urinating, frequency and hematuria  Musculoskeletal: Negative for arthralgias and neck pain  Skin: Negative for rash  Allergic/Immunologic: Negative for environmental allergies  Neurological: Negative for dizziness, weakness and headaches  Hematological: Does not bruise/bleed easily  Psychiatric/Behavioral: Negative for decreased concentration and sleep disturbance  The patient is not nervous/anxious  Current Outpatient Prescriptions:     hydrochlorothiazide (HYDRODIURIL) 25 mg tablet, Take 25 mg by mouth daily As needed , Disp: , Rfl:     lisinopril (ZESTRIL) 20 mg tablet, Take 1 tablet (20 mg total) by mouth daily, Disp: , Rfl: 0    LORazepam (ATIVAN) 0 5 mg tablet, 1 tablet, Disp: , Rfl:     Multiple Vitamin (MULTIVITAMIN) capsule, Take 1 capsule by mouth daily, Disp: , Rfl:     sildenafil (REVATIO) 20 mg tablet, every 24 hours, Disp: , Rfl:     zolpidem (AMBIEN) 10 mg tablet, Take 10 mg by mouth daily at bedtime as needed  , Disp: , Rfl:     albuterol (PROAIR HFA) 90 mcg/act inhaler, 6 (six) times a day, Disp: , Rfl:     citalopram (CeleXA) 40 mg tablet, every 24 hours, Disp: , Rfl:      Physical Exam:    Vitals:    06/21/18 0854   Weight: 111 kg (244 lb 9 6 oz)   Height: 5' 10" (1 778 m)       Physical Exam   Constitutional: He is oriented to person, place, and time  He appears well-developed and well-nourished  HENT:   Head: Normocephalic  Right Ear: External ear normal    Left Ear: External ear normal    Mouth/Throat: Oropharynx is clear and moist    Eyes: Pupils are equal, round, and reactive to light  Neck: No JVD present  Carotid bruit is not present     Cardiovascular: Normal rate, regular rhythm and intact distal pulses  Exam reveals no gallop and no friction rub  No murmur heard  Pulmonary/Chest: Effort normal and breath sounds normal  No tachypnea  No respiratory distress  He has no wheezes  He has no rales  He exhibits no tenderness  Abdominal: Soft  He exhibits no distension  There is no tenderness  There is no rebound and no guarding  Musculoskeletal: He exhibits no edema  Neurological: He is alert and oriented to person, place, and time  Skin: Skin is warm and dry  Psychiatric: He has a normal mood and affect  His behavior is normal  Judgment and thought content normal    Nursing note and vitals reviewed  Labs:not applicable    Assessment/Plan:    1  Preoperative evaluation prior to laparoscopic cholecystectomy-at this time he has no contraindications to proceeding from a cardiovascular standpoint and may proceed to the operating room without further testing  He is acceptable risk for cholecystectomy from a cardiovascular standpoint  2   Prevention of cardiovascular disease-he does have a coronary disease equivalent in the abdominal aortic aneurysm-I understand he is getting an endoscopy in July  Ultimately I would like him on aspirin statin but we will have to discussed the risks and benefits of doing so  I would like to order lipid testing and have him follow-up in 2 months and we can discuss a plan from a preventive cardiologic standpoint at that time  Thank you so much, please do not hesitate to contact me if there any questions or concerns        Britta Vizcaino MD  6/21/2018  9:02 AM

## 2018-06-21 NOTE — PROGRESS NOTES
[address] [facility]  [address] [facility]  [phone]    Cardiology Follow up    Patient:  Jaswinder Scott  :  1955  MRN:  [id number]    History of Present Illness:     65-year-old man with past medical history of hypertension abdominal aortic aneurysm status post percutaneous repair, and cirrhosis with gastroesophageal varices noted on CT scan-etiology of cirrhosis appears to be MILLER presents for evaluation prior to gallbladder surgery next Tuesday  He denies any chest pain, shortness of breath, palpitations, dizziness, or syncope  He feels better since his abdominal aortic repair  He works for [employer] in his job is ending in the fall  He continues to be  Patient Active Problem List   Diagnosis    AAA (abdominal aortic aneurysm) without rupture (HCC)    Hyperbilirubinemia    Transaminitis    Essential hypertension    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 ([address]    Cholelithiasis    Calculus of gallbladder without cholecystitis without obstruction    Umbilical hernia without obstruction and without gangrene       Past Surgical History  Past Surgical History:   Procedure Laterality Date    COLON SURGERY      colonoscopy    HERNIA REPAIR      KNEE ARTHROSCOPY      [place] EVASC RPR DPLMNT AORTO-AORTIC NDGFT N/A 2018    Procedure: REPAIR ANEURYSM ENDOVASCULAR ABDOMINAL AORTIC  (EVAR); Surgeon: Tanika Steele MD;  [facility];  Service: Vascular    ROTATOR CUFF REPAIR Right        Social History   Social History     Social History    Marital status: Single     Spouse name: N/A    Number of children: N/A    Years of education: N/A     Occupational History    Not on file       Social History Main Topics    Smoking status: Former Smoker     Quit date:     Smokeless tobacco: Never Used    Alcohol use Yes      Comment: Ocassional, 1 beer a month    Drug use: No    Sexual activity: Not on file     Other Topics Concern    Not on file     Social History Narrative    No narrative on file        Allergies   Allergen Reactions    Iodinated Diagnostic Agents      Other reaction(s): Unknown       Family History   Family History   Problem Relation Age of Onset    Pancreatic cancer Mother     Diabetes Mother     Cancer Father     Diabetes Father        Review of Systems:  Review of Systems   Constitutional: Negative for chills, fatigue and fever  HENT: Negative for hearing loss and trouble swallowing  Eyes: Negative for pain  Respiratory: Negative for cough, chest tightness and shortness of breath  Cardiovascular: Negative for chest pain, palpitations and leg swelling  Gastrointestinal: Negative for abdominal pain, blood in stool, nausea and vomiting  Endocrine: Negative for cold intolerance and heat intolerance  Genitourinary: Negative for difficulty urinating, frequency and hematuria  Musculoskeletal: Negative for arthralgias and neck pain  Skin: Negative for rash  Allergic/Immunologic: Negative for environmental allergies  Neurological: Negative for dizziness, weakness and headaches  Hematological: Does not bruise/bleed easily  Psychiatric/Behavioral: Negative for decreased concentration and sleep disturbance  The patient is not nervous/anxious            Current Outpatient Prescriptions:     hydrochlorothiazide (HYDRODIURIL) 25 mg tablet, Take 25 mg by mouth daily As needed , Disp: , Rfl:     lisinopril (ZESTRIL) 20 mg tablet, Take 1 tablet (20 mg total) by mouth daily, Disp: , Rfl: 0    LORazepam (ATIVAN) 0 5 mg tablet, 1 tablet, Disp: , Rfl:     Multiple Vitamin (MULTIVITAMIN) capsule, Take 1 capsule by mouth daily, Disp: , Rfl:     sildenafil (REVATIO) 20 mg tablet, every 24 hours, Disp: , Rfl:     zolpidem (AMBIEN) 10 mg tablet, Take 10 mg by mouth daily at bedtime as needed  , Disp: , Rfl:     albuterol (PROAIR HFA) 90 mcg/act inhaler, 6 (six) times a day, Disp: , Rfl:     citalopram (CeleXA) 40 mg tablet, every 24 hours, Disp: , Rfl:      Physical Exam:    Vitals:    06/21/18 0854   Weight: 111 kg (244 lb 9 6 oz)   Height: 5' 10" (1 778 m)       Physical Exam   Constitutional: He is oriented to person, place, and time  He appears well-developed and well-nourished  HENT:   Head: Normocephalic  Right Ear: External ear normal    Left Ear: External ear normal    Mouth/Throat: Oropharynx is clear and moist    Eyes: Pupils are equal, round, and reactive to light  Neck: No JVD present  Carotid bruit is not present  Cardiovascular: Normal rate, regular rhythm and intact distal pulses  Exam reveals no gallop and no friction rub  No murmur heard  Pulmonary/Chest: Effort normal and breath sounds normal  No tachypnea  No respiratory distress  He has no wheezes  He has no rales  He exhibits no tenderness  Abdominal: Soft  He exhibits no distension  There is no tenderness  There is no rebound and no guarding  Musculoskeletal: He exhibits no edema  Neurological: He is alert and oriented to person, place, and time  Skin: Skin is warm and dry  Psychiatric: He has a normal mood and affect  His behavior is normal  Judgment and thought content normal    Nursing note and vitals reviewed  Labs:not applicable    Assessment/Plan:    1  Preoperative evaluation prior to laparoscopic cholecystectomy-at this time he has no contraindications to proceeding from a cardiovascular standpoint and may proceed to the operating room without further testing  He is acceptable risk for cholecystectomy from a cardiovascular standpoint  2   Prevention of cardiovascular disease-he does have a coronary disease equivalent in the abdominal aortic aneurysm-I understand he is getting an endoscopy in July  Ultimately I would like him on aspirin statin but we will have to discussed the risks and benefits of doing so      I would like to order lipid testing and have him follow-up in 2 months and we can discuss a plan from a preventive cardiologic standpoint at that time  3   Hypertension-this is a bit elevated but he did not take his blood pressure medicines this morning  We will follow-up on this in the future when he takes his medicines on time  Thank you so much, please do not hesitate to contact me if there any questions or concerns        Hailey Hillman MD  6/21/2018  9:02 AM

## 2018-06-21 NOTE — LETTER
2018     Ingris Fragoso, [address]  [address]  [facility] [address]    Patient: Elisha Vasquez   YOB: 1955   Date of Visit: 2018       Dear Dr Lennox Foyer:    Thank you for referring Jett Lopez to me for evaluation  Below are my notes for this consultation  If you have questions, please do not hesitate to call me  I look forward to following your patient along with you  Sincerely,        Parish Marshall MD        CC: No Recipients  Parish Marshall MD  2018  9:29 AM  Sign at close encounter  [address] Cardiology [facility] AT [facility], [address] [place] [address]  [phone]    Cardiology Follow up    Patient:  Elisha Vasquez  :  1955  MRN:  [id number]    History of Present Illness:     79-year-old man with past medical history of hypertension abdominal aortic aneurysm status post percutaneous repair, and cirrhosis with gastroesophageal varices noted on CT scan-etiology of cirrhosis appears to be MILLER presents for evaluation prior to gallbladder surgery next Tuesday  He denies any chest pain, shortness of breath, palpitations, dizziness, or syncope  He feels better since his abdominal aortic repair  He works for [employer] in his job is ending in the fall  He continues to be  Patient Active Problem List   Diagnosis    AAA (abdominal aortic aneurysm) without rupture (HCC)    Hyperbilirubinemia    Transaminitis    Essential hypertension    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 ([address] )    Cholelithiasis    Calculus of gallbladder without cholecystitis without obstruction    Umbilical hernia without obstruction and without gangrene       Past Surgical History  Past Surgical History:   Procedure Laterality Date    COLON SURGERY      colonoscopy    HERNIA REPAIR      KNEE ARTHROSCOPY      MD EVASC RPR DPLMNT AORTO-AORTIC NDGFT N/A 2018    Procedure: REPAIR ANEURYSM ENDOVASCULAR ABDOMINAL AORTIC  (EVAR);   Surgeon: Angelia Maharaj Seble Avendaño MD;  [facility]: [facility];  Service: Vascular    ROTATOR CUFF REPAIR Right        Social History   Social History     Social History    Marital status: Single     Spouse name: N/A    Number of children: N/A    Years of education: N/A     Occupational History    Not on file  Social History Main Topics    Smoking status: Former Smoker     Quit date: 1990    Smokeless tobacco: Never Used    Alcohol use Yes      Comment: Ocassional, 1 beer a month    Drug use: No    Sexual activity: Not on file     Other Topics Concern    Not on file     Social History Narrative    No narrative on file        Allergies   Allergen Reactions    Iodinated Diagnostic Agents      Other reaction(s): Unknown       Family History   Family History   Problem Relation Age of Onset    Pancreatic cancer Mother     Diabetes Mother     Cancer Father     Diabetes Father        Review of Systems:  Review of Systems   Constitutional: Negative for chills, fatigue and fever  HENT: Negative for hearing loss and trouble swallowing  Eyes: Negative for pain  Respiratory: Negative for cough, chest tightness and shortness of breath  Cardiovascular: Negative for chest pain, palpitations and leg swelling  Gastrointestinal: Negative for abdominal pain, blood in stool, nausea and vomiting  Endocrine: Negative for cold intolerance and heat intolerance  Genitourinary: Negative for difficulty urinating, frequency and hematuria  Musculoskeletal: Negative for arthralgias and neck pain  Skin: Negative for rash  Allergic/Immunologic: Negative for environmental allergies  Neurological: Negative for dizziness, weakness and headaches  Hematological: Does not bruise/bleed easily  Psychiatric/Behavioral: Negative for decreased concentration and sleep disturbance  The patient is not nervous/anxious            Current Outpatient Prescriptions:     hydrochlorothiazide (HYDRODIURIL) 25 mg tablet, Take 25 mg by mouth daily As needed , Disp: , Rfl:     lisinopril (ZESTRIL) 20 mg tablet, Take 1 tablet (20 mg total) by mouth daily, Disp: , Rfl: 0    LORazepam (ATIVAN) 0 5 mg tablet, 1 tablet, Disp: , Rfl:     Multiple Vitamin (MULTIVITAMIN) capsule, Take 1 capsule by mouth daily, Disp: , Rfl:     sildenafil (REVATIO) 20 mg tablet, every 24 hours, Disp: , Rfl:     zolpidem (AMBIEN) 10 mg tablet, Take 10 mg by mouth daily at bedtime as needed  , Disp: , Rfl:     albuterol (PROAIR HFA) 90 mcg/act inhaler, 6 (six) times a day, Disp: , Rfl:     citalopram (CeleXA) 40 mg tablet, every 24 hours, Disp: , Rfl:      Physical Exam:    Vitals:    06/21/18 0854   Weight: 111 kg (244 lb 9 6 oz)   Height: 5' 10" (1 778 m)       Physical Exam   Constitutional: He is oriented to person, place, and time  He appears well-developed and well-nourished  HENT:   Head: Normocephalic  Right Ear: External ear normal    Left Ear: External ear normal    Mouth/Throat: Oropharynx is clear and moist    Eyes: Pupils are equal, round, and reactive to light  Neck: No JVD present  Carotid bruit is not present  Cardiovascular: Normal rate, regular rhythm and intact distal pulses  Exam reveals no gallop and no friction rub  No murmur heard  Pulmonary/Chest: Effort normal and breath sounds normal  No tachypnea  No respiratory distress  He has no wheezes  He has no rales  He exhibits no tenderness  Abdominal: Soft  He exhibits no distension  There is no tenderness  There is no rebound and no guarding  Musculoskeletal: He exhibits no edema  Neurological: He is alert and oriented to person, place, and time  Skin: Skin is warm and dry  Psychiatric: He has a normal mood and affect  His behavior is normal  Judgment and thought content normal    Nursing note and vitals reviewed  Labs:not applicable    Assessment/Plan:    1    Preoperative evaluation prior to laparoscopic cholecystectomy-at this time he has no contraindications to proceeding from a cardiovascular standpoint and may proceed to the operating room without further testing  He is acceptable risk for cholecystectomy from a cardiovascular standpoint  2   Prevention of cardiovascular disease-he does have a coronary disease equivalent in the abdominal aortic aneurysm-I understand he is getting an endoscopy in July  Ultimately I would like him on aspirin statin but we will have to discussed the risks and benefits of doing so  I would like to order lipid testing and have him follow-up in 2 months and we can discuss a plan from a preventive cardiologic standpoint at that time  3   Hypertension-this is a bit elevated but he did not take his blood pressure medicines this morning  We will follow-up on this in the future when he takes his medicines on time  Thank you so much, please do not hesitate to contact me if there any questions or concerns        Malena Sanchez MD  6/21/2018  9:02 AM

## 2018-06-21 NOTE — LETTER
2018     Farhan Valerio, [address]  [address]  [facility] [address]    Patient: Linda Olmedo   YOB: 1955   Date of Visit: 2018       Dear Dr Zaria Tejada:    Thank you for referring Natacha Almaraz to me for evaluation  Below are my notes for this consultation  If you have questions, please do not hesitate to call me  I look forward to following your patient along with you  Sincerely,        Donette Olszewski, MD        CC: No Recipients  Donette Olszewski, MD  2018  9:21 AM  Sign at close encounter  [address] Cardiology [facility] AT [facility]  [address], [address] [facility]  [phone]    Cardiology Follow up    Patient:  Linda Olmedo  :  1955  MRN:  [id number]    History of Present Illness:     ***    Patient Active Problem List   Diagnosis    AAA (abdominal aortic aneurysm) without rupture ([facility] )    Hyperbilirubinemia    Transaminitis    Essential hypertension    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 ([facility]    Cholelithiasis    Calculus of gallbladder without cholecystitis without obstruction    Umbilical hernia without obstruction and without gangrene       Past Surgical History  Past Surgical History:   Procedure Laterality Date    COLON SURGERY      colonoscopy    HERNIA REPAIR      KNEE ARTHROSCOPY      NM EVASC RPR DPLMNT AORTO-AORTIC NDGFT N/A 2018    Procedure: REPAIR ANEURYSM ENDOVASCULAR ABDOMINAL AORTIC  (EVAR); Surgeon: Sydney Chavarria MD;  [facility]: [facility];  Service: Vascular    ROTATOR CUFF REPAIR Right        Social History   Social History     Social History    Marital status: Single     Spouse name: N/A    Number of children: N/A    Years of education: N/A     Occupational History    Not on file       Social History Main Topics    Smoking status: Former Smoker     Quit date:     Smokeless tobacco: Never Used    Alcohol use Yes      Comment: Ocassional, 1 beer a month    Drug use: No    Sexual activity: Not on file Other Topics Concern    Not on file     Social History Narrative    No narrative on file        No Active Allergies    Family History   Family History   Problem Relation Age of Onset    Pancreatic cancer Mother     Diabetes Mother     Cancer Father     Diabetes Father        Review of Systems:  Review of Systems      Current Outpatient Prescriptions:     hydrochlorothiazide (HYDRODIURIL) 25 mg tablet, Take 25 mg by mouth daily As needed , Disp: , Rfl:     lisinopril (ZESTRIL) 20 mg tablet, Take 1 tablet (20 mg total) by mouth daily, Disp: , Rfl: 0    LORazepam (ATIVAN) 0 5 mg tablet, 1 tablet, Disp: , Rfl:     Multiple Vitamin (MULTIVITAMIN) capsule, Take 1 capsule by mouth daily, Disp: , Rfl:     sildenafil (REVATIO) 20 mg tablet, every 24 hours, Disp: , Rfl:     zolpidem (AMBIEN) 10 mg tablet, Take 10 mg by mouth daily at bedtime as needed  , Disp: , Rfl:     albuterol (PROAIR HFA) 90 mcg/act inhaler, 6 (six) times a day, Disp: , Rfl:     citalopram (CeleXA) 40 mg tablet, every 24 hours, Disp: , Rfl:      Physical Exam:    Vitals:    18 0854   BP: 150/94   Pulse: 60   Resp: 12   Weight: 111 kg (244 lb 9 6 oz)   Height: 5' 10" (1 778 m)       Physical Exam    Labs:{Recent [facility]:17842::"TCF applicable"}    Assessment/Plan:    ***      Betty Neville MD  2018  9:15 AM    Betty Neville MD  2018  9:29 AM  Sign at close encounter  [facility] AT [place] [address], [address] [place], [place] [phone]    Cardiology Follow up    Patient:  Didier Crespo  :  1955  MRN:  [id number]    History of Present Illness:     76-year-old man with past medical history of hypertension abdominal aortic aneurysm status post percutaneous repair, and cirrhosis with gastroesophageal varices noted on CT scan-etiology of cirrhosis appears to be MILLER presents for evaluation prior to gallbladder surgery next Tuesday      He denies any chest pain, shortness of breath, palpitations, dizziness, or syncope  He feels better since his abdominal aortic repair  He works for [email or web address] in his job is ending in the fall  He continues to be  Patient Active Problem List   Diagnosis    AAA (abdominal aortic aneurysm) without rupture (HCC)    Hyperbilirubinemia    Transaminitis    Essential hypertension    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 ([address] )    Cholelithiasis    Calculus of gallbladder without cholecystitis without obstruction    Umbilical hernia without obstruction and without gangrene       Past Surgical History  Past Surgical History:   Procedure Laterality Date    COLON SURGERY      colonoscopy    HERNIA REPAIR      KNEE ARTHROSCOPY      [place] EVASC RPR DPLMNT AORTO-AORTIC NDGFT N/A 4/23/2018    Procedure: REPAIR ANEURYSM ENDOVASCULAR ABDOMINAL AORTIC  (EVAR); Surgeon: Phuc Marie MD;  [facility];  Service: Vascular    ROTATOR CUFF REPAIR Right        Social History   Social History     Social History    Marital status: Single     Spouse name: N/A    Number of children: N/A    Years of education: N/A     Occupational History    Not on file  Social History Main Topics    Smoking status: Former Smoker     Quit date: 1990    Smokeless tobacco: Never Used    Alcohol use Yes      Comment: Ocassional, 1 beer a month    Drug use: No    Sexual activity: Not on file     Other Topics Concern    Not on file     Social History Narrative    No narrative on file        Allergies   Allergen Reactions    Iodinated Diagnostic Agents      Other reaction(s): Unknown       Family History   Family History   Problem Relation Age of Onset    Pancreatic cancer Mother     Diabetes Mother     Cancer Father     Diabetes Father        Review of Systems:  Review of Systems   Constitutional: Negative for chills, fatigue and fever  HENT: Negative for hearing loss and trouble swallowing  Eyes: Negative for pain     Respiratory: Negative for cough, chest tightness and shortness of breath  Cardiovascular: Negative for chest pain, palpitations and leg swelling  Gastrointestinal: Negative for abdominal pain, blood in stool, nausea and vomiting  Endocrine: Negative for cold intolerance and heat intolerance  Genitourinary: Negative for difficulty urinating, frequency and hematuria  Musculoskeletal: Negative for arthralgias and neck pain  Skin: Negative for rash  Allergic/Immunologic: Negative for environmental allergies  Neurological: Negative for dizziness, weakness and headaches  Hematological: Does not bruise/bleed easily  Psychiatric/Behavioral: Negative for decreased concentration and sleep disturbance  The patient is not nervous/anxious  Current Outpatient Prescriptions:     hydrochlorothiazide (HYDRODIURIL) 25 mg tablet, Take 25 mg by mouth daily As needed , Disp: , Rfl:     lisinopril (ZESTRIL) 20 mg tablet, Take 1 tablet (20 mg total) by mouth daily, Disp: , Rfl: 0    LORazepam (ATIVAN) 0 5 mg tablet, 1 tablet, Disp: , Rfl:     Multiple Vitamin (MULTIVITAMIN) capsule, Take 1 capsule by mouth daily, Disp: , Rfl:     sildenafil (REVATIO) 20 mg tablet, every 24 hours, Disp: , Rfl:     zolpidem (AMBIEN) 10 mg tablet, Take 10 mg by mouth daily at bedtime as needed  , Disp: , Rfl:     albuterol (PROAIR HFA) 90 mcg/act inhaler, 6 (six) times a day, Disp: , Rfl:     citalopram (CeleXA) 40 mg tablet, every 24 hours, Disp: , Rfl:      Physical Exam:    Vitals:    06/21/18 0854   Weight: 111 kg (244 lb 9 6 oz)   Height: 5' 10" (1 778 m)       Physical Exam   Constitutional: He is oriented to person, place, and time  He appears well-developed and well-nourished  HENT:   Head: Normocephalic  Right Ear: External ear normal    Left Ear: External ear normal    Mouth/Throat: Oropharynx is clear and moist    Eyes: Pupils are equal, round, and reactive to light  Neck: No JVD present  Carotid bruit is not present     Cardiovascular: Normal rate, regular rhythm and intact distal pulses  Exam reveals no gallop and no friction rub  No murmur heard  Pulmonary/Chest: Effort normal and breath sounds normal  No tachypnea  No respiratory distress  He has no wheezes  He has no rales  He exhibits no tenderness  Abdominal: Soft  He exhibits no distension  There is no tenderness  There is no rebound and no guarding  Musculoskeletal: He exhibits no edema  Neurological: He is alert and oriented to person, place, and time  Skin: Skin is warm and dry  Psychiatric: He has a normal mood and affect  His behavior is normal  Judgment and thought content normal    Nursing note and vitals reviewed  Labs:not applicable    Assessment/Plan:    1  Preoperative evaluation prior to laparoscopic cholecystectomy-at this time he has no contraindications to proceeding from a cardiovascular standpoint and may proceed to the operating room without further testing  He is acceptable risk for cholecystectomy from a cardiovascular standpoint  2   Prevention of cardiovascular disease-he does have a coronary disease equivalent in the abdominal aortic aneurysm-I understand he is getting an endoscopy in July  Ultimately I would like him on aspirin statin but we will have to discussed the risks and benefits of doing so  I would like to order lipid testing and have him follow-up in 2 months and we can discuss a plan from a preventive cardiologic standpoint at that time  3   Hypertension-this is a bit elevated but he did not take his blood pressure medicines this morning  We will follow-up on this in the future when he takes his medicines on time  Thank you so much, please do not hesitate to contact me if there any questions or concerns        Priscila Calvillo MD  6/21/2018  9:02 AM

## 2018-06-26 ENCOUNTER — APPOINTMENT (OUTPATIENT)
Dept: RADIOLOGY | Facility: HOSPITAL | Age: 63
End: 2018-06-26
Attending: SURGERY
Payer: COMMERCIAL

## 2018-06-26 ENCOUNTER — HOSPITAL ENCOUNTER (OUTPATIENT)
Facility: HOSPITAL | Age: 63
Setting detail: OUTPATIENT SURGERY
Discharge: HOME | End: 2018-06-26
Attending: SURGERY | Admitting: SURGERY
Payer: COMMERCIAL

## 2018-06-26 ENCOUNTER — ANESTHESIA (OUTPATIENT)
Dept: PERIOP | Facility: HOSPITAL | Age: 63
End: 2018-06-26
Payer: COMMERCIAL

## 2018-06-26 VITALS
RESPIRATION RATE: 18 BRPM | WEIGHT: 240 LBS | BODY MASS INDEX: 34.36 KG/M2 | SYSTOLIC BLOOD PRESSURE: 173 MMHG | HEIGHT: 70 IN | OXYGEN SATURATION: 95 % | TEMPERATURE: 97.8 F | DIASTOLIC BLOOD PRESSURE: 84 MMHG | HEART RATE: 57 BPM

## 2018-06-26 DIAGNOSIS — K80.20 CALCULUS OF GALLBLADDER WITHOUT CHOLECYSTITIS WITHOUT OBSTRUCTION: ICD-10-CM

## 2018-06-26 DIAGNOSIS — K42.9 UMBILICAL HERNIA WITHOUT OBSTRUCTION AND WITHOUT GANGRENE: ICD-10-CM

## 2018-06-26 LAB
ABO GROUP BLD: NORMAL
ALBUMIN SERPL BCP-MCNC: 3.1 G/DL (ref 3.5–5)
ALP SERPL-CCNC: 114 U/L (ref 46–116)
ALT SERPL W P-5'-P-CCNC: 48 U/L (ref 12–78)
AST SERPL W P-5'-P-CCNC: 66 U/L (ref 5–45)
BILIRUB DIRECT SERPL-MCNC: 0.49 MG/DL (ref 0–0.2)
BILIRUB SERPL-MCNC: 2.1 MG/DL (ref 0.2–1)
BLD GP AB SCN SERPL QL: NEGATIVE
ERYTHROCYTE [DISTWIDTH] IN BLOOD BY AUTOMATED COUNT: 14 % (ref 11.6–15.1)
GLUCOSE SERPL-MCNC: 111 MG/DL (ref 65–140)
HCT VFR BLD AUTO: 41.1 % (ref 36.5–49.3)
HGB BLD-MCNC: 14 G/DL (ref 12–17)
MCH RBC QN AUTO: 32.7 PG (ref 26.8–34.3)
MCHC RBC AUTO-ENTMCNC: 34.1 G/DL (ref 31.4–37.4)
MCV RBC AUTO: 96 FL (ref 82–98)
PLATELET # BLD AUTO: 96 THOUSANDS/UL (ref 149–390)
PMV BLD AUTO: 9.8 FL (ref 8.9–12.7)
PROT SERPL-MCNC: 7.5 G/DL (ref 6.4–8.2)
RBC # BLD AUTO: 4.28 MILLION/UL (ref 3.88–5.62)
RH BLD: NEGATIVE
SPECIMEN EXPIRATION DATE: NORMAL
WBC # BLD AUTO: 5.68 THOUSAND/UL (ref 4.31–10.16)

## 2018-06-26 PROCEDURE — 74300 X-RAY BILE DUCTS/PANCREAS: CPT

## 2018-06-26 PROCEDURE — 47563 LAPARO CHOLECYSTECTOMY/GRAPH: CPT | Performed by: SURGERY

## 2018-06-26 PROCEDURE — 47001 NDL BIOPSY LVR TM OTH MAJ PX: CPT | Performed by: SURGERY

## 2018-06-26 PROCEDURE — 82948 REAGENT STRIP/BLOOD GLUCOSE: CPT

## 2018-06-26 PROCEDURE — 80076 HEPATIC FUNCTION PANEL: CPT | Performed by: PHYSICIAN ASSISTANT

## 2018-06-26 PROCEDURE — 85027 COMPLETE CBC AUTOMATED: CPT | Performed by: PHYSICIAN ASSISTANT

## 2018-06-26 PROCEDURE — 88307 TISSUE EXAM BY PATHOLOGIST: CPT | Performed by: PATHOLOGY

## 2018-06-26 PROCEDURE — 88313 SPECIAL STAINS GROUP 2: CPT | Performed by: PATHOLOGY

## 2018-06-26 PROCEDURE — 88304 TISSUE EXAM BY PATHOLOGIST: CPT | Performed by: PATHOLOGY

## 2018-06-26 PROCEDURE — 86850 RBC ANTIBODY SCREEN: CPT | Performed by: SURGERY

## 2018-06-26 PROCEDURE — 47563 LAPARO CHOLECYSTECTOMY/GRAPH: CPT | Performed by: PHYSICIAN ASSISTANT

## 2018-06-26 PROCEDURE — 86901 BLOOD TYPING SEROLOGIC RH(D): CPT | Performed by: SURGERY

## 2018-06-26 PROCEDURE — 86900 BLOOD TYPING SEROLOGIC ABO: CPT | Performed by: SURGERY

## 2018-06-26 RX ORDER — SODIUM CHLORIDE, SODIUM LACTATE, POTASSIUM CHLORIDE, CALCIUM CHLORIDE 600; 310; 30; 20 MG/100ML; MG/100ML; MG/100ML; MG/100ML
125 INJECTION, SOLUTION INTRAVENOUS CONTINUOUS
Status: DISPENSED | OUTPATIENT
Start: 2018-06-26 | End: 2018-06-26

## 2018-06-26 RX ORDER — GLYCOPYRROLATE 0.2 MG/ML
INJECTION INTRAMUSCULAR; INTRAVENOUS AS NEEDED
Status: DISCONTINUED | OUTPATIENT
Start: 2018-06-26 | End: 2018-06-26 | Stop reason: SURG

## 2018-06-26 RX ORDER — ONDANSETRON 2 MG/ML
4 INJECTION INTRAMUSCULAR; INTRAVENOUS EVERY 6 HOURS PRN
Status: DISCONTINUED | OUTPATIENT
Start: 2018-06-26 | End: 2018-06-26 | Stop reason: HOSPADM

## 2018-06-26 RX ORDER — SUCCINYLCHOLINE CHLORIDE 20 MG/ML
INJECTION INTRAMUSCULAR; INTRAVENOUS AS NEEDED
Status: DISCONTINUED | OUTPATIENT
Start: 2018-06-26 | End: 2018-06-26 | Stop reason: SURG

## 2018-06-26 RX ORDER — OXYCODONE HYDROCHLORIDE 5 MG/1
10 TABLET ORAL EVERY 4 HOURS PRN
Status: DISCONTINUED | OUTPATIENT
Start: 2018-06-26 | End: 2018-06-26 | Stop reason: HOSPADM

## 2018-06-26 RX ORDER — METOCLOPRAMIDE HYDROCHLORIDE 5 MG/ML
10 INJECTION INTRAMUSCULAR; INTRAVENOUS ONCE AS NEEDED
Status: DISCONTINUED | OUTPATIENT
Start: 2018-06-26 | End: 2018-06-26 | Stop reason: HOSPADM

## 2018-06-26 RX ORDER — MIDAZOLAM HYDROCHLORIDE 1 MG/ML
INJECTION INTRAMUSCULAR; INTRAVENOUS AS NEEDED
Status: DISCONTINUED | OUTPATIENT
Start: 2018-06-26 | End: 2018-06-26 | Stop reason: SURG

## 2018-06-26 RX ORDER — ROCURONIUM BROMIDE 10 MG/ML
INJECTION, SOLUTION INTRAVENOUS AS NEEDED
Status: DISCONTINUED | OUTPATIENT
Start: 2018-06-26 | End: 2018-06-26 | Stop reason: SURG

## 2018-06-26 RX ORDER — FENTANYL CITRATE/PF 50 MCG/ML
25 SYRINGE (ML) INJECTION
Status: DISCONTINUED | OUTPATIENT
Start: 2018-06-26 | End: 2018-06-26 | Stop reason: HOSPADM

## 2018-06-26 RX ORDER — PROPOFOL 10 MG/ML
INJECTION, EMULSION INTRAVENOUS AS NEEDED
Status: DISCONTINUED | OUTPATIENT
Start: 2018-06-26 | End: 2018-06-26 | Stop reason: SURG

## 2018-06-26 RX ORDER — FENTANYL CITRATE 50 UG/ML
INJECTION, SOLUTION INTRAMUSCULAR; INTRAVENOUS AS NEEDED
Status: DISCONTINUED | OUTPATIENT
Start: 2018-06-26 | End: 2018-06-26 | Stop reason: SURG

## 2018-06-26 RX ORDER — OXYCODONE HYDROCHLORIDE 5 MG/1
5-10 TABLET ORAL EVERY 4 HOURS PRN
Qty: 30 TABLET | Refills: 0 | Status: SHIPPED | OUTPATIENT
Start: 2018-06-26 | End: 2018-07-06

## 2018-06-26 RX ORDER — ONDANSETRON 2 MG/ML
INJECTION INTRAMUSCULAR; INTRAVENOUS AS NEEDED
Status: DISCONTINUED | OUTPATIENT
Start: 2018-06-26 | End: 2018-06-26 | Stop reason: SURG

## 2018-06-26 RX ADMIN — ROCURONIUM BROMIDE 40 MG: 10 SOLUTION INTRAVENOUS at 07:40

## 2018-06-26 RX ADMIN — GLYCOPYRROLATE 0.8 MG: 0.2 INJECTION, SOLUTION INTRAMUSCULAR; INTRAVENOUS at 08:35

## 2018-06-26 RX ADMIN — FENTANYL CITRATE 50 MCG: 50 INJECTION, SOLUTION INTRAMUSCULAR; INTRAVENOUS at 07:45

## 2018-06-26 RX ADMIN — CEFAZOLIN SODIUM 2000 MG: 2 SOLUTION INTRAVENOUS at 07:45

## 2018-06-26 RX ADMIN — SODIUM CHLORIDE, SODIUM LACTATE, POTASSIUM CHLORIDE, AND CALCIUM CHLORIDE 125 ML/HR: .6; .31; .03; .02 INJECTION, SOLUTION INTRAVENOUS at 06:35

## 2018-06-26 RX ADMIN — FENTANYL CITRATE 50 MCG: 50 INJECTION, SOLUTION INTRAMUSCULAR; INTRAVENOUS at 07:40

## 2018-06-26 RX ADMIN — METRONIDAZOLE 500 MG: 500 INJECTION, SOLUTION INTRAVENOUS at 07:30

## 2018-06-26 RX ADMIN — MIDAZOLAM HYDROCHLORIDE 2 MG: 1 INJECTION, SOLUTION INTRAMUSCULAR; INTRAVENOUS at 07:35

## 2018-06-26 RX ADMIN — ONDANSETRON 4 MG: 2 INJECTION INTRAMUSCULAR; INTRAVENOUS at 08:00

## 2018-06-26 RX ADMIN — FENTANYL CITRATE 50 MCG: 50 INJECTION, SOLUTION INTRAMUSCULAR; INTRAVENOUS at 08:00

## 2018-06-26 RX ADMIN — SUCCINYLCHOLINE CHLORIDE 100 MG: 20 INJECTION, SOLUTION INTRAMUSCULAR; INTRAVENOUS; PARENTERAL at 07:35

## 2018-06-26 RX ADMIN — NEOSTIGMINE METHYLSULFATE 4 MG: 1 INJECTION, SOLUTION INTRAMUSCULAR; INTRAVENOUS; SUBCUTANEOUS at 08:30

## 2018-06-26 RX ADMIN — PROPOFOL 200 MG: 10 INJECTION, EMULSION INTRAVENOUS at 07:35

## 2018-06-26 RX ADMIN — DEXAMETHASONE SODIUM PHOSPHATE 4 MG: 10 INJECTION INTRAMUSCULAR; INTRAVENOUS at 07:45

## 2018-06-26 RX ADMIN — FENTANYL CITRATE 50 MCG: 50 INJECTION, SOLUTION INTRAMUSCULAR; INTRAVENOUS at 07:35

## 2018-06-26 RX ADMIN — OXYCODONE HYDROCHLORIDE 10 MG: 5 TABLET ORAL at 09:42

## 2018-06-26 NOTE — DISCHARGE INSTRUCTIONS
Dulce Ramsay Instructions      Dr Leonarda GARRETT     1  General: Jennifer Roche will feel pulling sensations around the wound or funny aches and pains around the incisions  This is normal  Even minor surgery is a change in your body and this is your bodys way of reaction to it  If you have had abdominal surgery, it may help to support the incision with a small pillow or blanket for comfort when moving or coughing  2  Wound care:       Glue - Leave glue alone, it will fall off on its own, no need for an additional dressings    3  Water: You may shower over the wound, unless there are drain tubes left in place  Do not bathe or use a pool or hot tub until cleared by the physician  You may shower right over the staples, glue or Steri-Strips and rinse wound with soapy water but do not scrub incision pat dry when you are done  4  Activity: You may go up and down stairs, walk as much as you are comfortable, but walk at least 3 times each day  If you have had abdominal surgery, do not lift anything heavier than 15 pounds for at least 2 weeks, unless cleared by the doctor  5  Diet: You may resume a regular diet  If you had a same-day surgery or overnight stay surgery, you may wish to eat lightly for a few days: soups, crackers, and sandwiches  You may resume a regular diet when ready  6  Medications: Resume all of your previous medications, unless told otherwise by the doctor  Avoid aspirin or ibuprofen (Advil, Motrin, etc ) products for 2-3 days after the date of surgery  You may, at that time, began to take them again  Tylenol is always fine, unless you are taking any narcotic pain medication containing Tylenol (such as Percocet, Darvocet, Vicodin, or anything containing acetaminophen)  Do not take Tylenol if you're taking these medications  You do not need to take the narcotic pain medications unless you are having significant pain and discomfort      7  Driving: He will need someone to drive you home on the day of surgery  Do not drive or make any important decisions while on narcotic pain medication or 24 hours and after anesthesia or sedation for surgery  Generally, you may drive when your off all narcotic pain medications  8  Upset Stomach: You may take Maalox, Tums, or similar items for an upset stomach  If your narcotic pain medication causes an upset stomach, do not take it on an empty stomach  Try taking it with at least some crackers or toast      9  Constipation: Patients often experienced constipation after surgery  You may take over-the-counter medication for this, such as Metamucil, Senokot, Dulcolax, milk of magnesia, etc  You may take a suppository unless you have had anorectal surgery such as a procedure on your hemorrhoids  If you experience significant nausea or vomiting after abdominal surgery, call the office before trying any of these medications  10  Call the office: If you are experiencing any of the following, fevers above 101 5°, significant nausea or vomiting, if the wound develops drainage and/or is excessive redness around the wound, or if you have significant diarrhea or other worsening symptoms  11  Pain: You may be given a prescription for pain  This will be given to the hospital, the day of surgery  12  Sexual Activity: You may resume sexual activity when you feel ready and comfortable and your incision is sealed and healed without apparent infection risk  13  Urination: If you haven't urinated in 6 hours, go directly to the ER for evaluation for urinary retention  14  Follow-up in 2 weeks          [facility]  Phone: [phone]

## 2018-06-26 NOTE — OP NOTE
CHOLECYSTECTOMY LAPAROSCOPIC, REPAIR HERNIA UMBILICAL, CHOLANGIOGRAM intraop, BIOPSY CORE NEEDLE liver  Postoperative Note  PATIENT NAME: Kleber Ward  : 1955  MRN: [id number]  [facility] RO[facility] 02    Surgery Date: 2018    Pre-op Dx:  Calculus of gallbladder without cholecystitis without obstruction [A13 57]  Umbilical hernia without obstruction and without gangrene [K42 9]  Elevated LFT's    Post-Op Diagnosis Codes:     * Calculus of gallbladder without cholecystitis without obstruction [Z22 84]     * Umbilical hernia without obstruction and without gangrene [K42 9]  Elevated Lft's    Procedure(s):  CHOLECYSTECTOMY LAPAROSCOPIC  REPAIR HERNIA UMBILICAL  CHOLANGIOGRAM intraop  BIOPSY CORE NEEDLE liver    Surgeon(s) and Role:     * Khris Welsh MD - Primary     * Mario Petty PA-C - Assisting    The Physician Assistant was medically necessary for surgical safety the case including suturing, retraction, and hemostasis  Specimens:  ID Type Source Tests Collected by Time Destination   1 :  Tissue Gallbladder TISSUE EXAM Khris Welsh MD 2018    2 : liver biopsy Tissue Liver TISSUE EXAM Khris Welsh MD 2018        Estimated Blood Loss:   Minimal    Anesthesia Type:   General     Procedure: The patient was seen again in the Holding Room  The risks, benefits, complications, treatment options, and expected outcomes were discussed with the patient  The possibilities of reaction to medication, pulmonary aspiration, perforation of viscus, bleeding, recurrent infection, finding a normal gallbladder, the need for additional procedures, failure to diagnose a condition, the possible need to convert to an open procedure, and creating a complication requiring transfusion or operation were discussed with the patient  The patient and/or family concurred with the proposed plan, giving informed consent  The site of surgery properly noted/marked   The patient was taken to Operating Room, identified and the procedure verified as Laparoscopic Cholecystectomy with possible Intraoperative Cholangiogram  A Time Out was held after prepping and draping in sterile fashion  The above information was confirmed  Prior to the induction of general anesthesia, antibiotic prophylaxis was administered  General endotracheal anesthesia was then administered and tolerated well  After the induction, the abdomen was prepped in the usual sterile fashion  The patient was positioned in the supine position, along with some reverse Trendelenburg  Local anesthetic agent was injected into the skin near the umbilicus and an incision made  A sheridan was passed around the umbilical stalk and this was  from the underlying umbilical hernia using cautery  A tabby trocar was placed through the umbilical hernia defect and pneumoperitoneum was then created with CO2 and tolerated well without any adverse changes in the patient's vital signs  Additional trocars were introduced under direct vision  All skin incisions were infiltrated with a local anesthetic agent before making the incision and placing the trocars  The patient was placed in the head up, left side down position  The gallbladder was identified, the fundus grasped and retracted cephalad and laterally  Adhesions were lysed bluntly and with the electrocautery or harmonic scapel  where indicated, taking care not to injure any adjacent organs or viscus  The infundibulum was grasped and retracted laterally, exposing the peritoneum overlying the triangle of Calot  This was then dissected anteriorily and posteriorly from the gallbladder,  and exposed in a blunt fashion or using cautery or harmonic scapel where appropriate  The cystic duct was clearly identified and  dissected circumferentially, as was the cystic artery, as the only two tubular structures leading into the gallbladder   The critical view of the [address] was identified and opened  The posterior aspect of the gallbladder was lifted off the cystic plate, to insure that there were no posterior structres behind the gallbladder or leading into the liver  Once this was all clearly identified, the cystic duct was clipped superiorly and divided in half below this and an IOC was performed in standard fashion  The cholangiogram showed no filling defects  The catheter was removed and the cystic duct was then doubly ligated with surgical clips on the patient side and singly clipped on the gallbladder side and divided  The cystic artery was re-identified,  ligated with clips and divided as well  If necessary, the cystic duct was "miked" back of any stones felt in the cystic duct, prior to clipping the patient side of the duct  The gallbladder was dissected from the liver bed in retrograde fashion with the electrocautery and/or harmonic scalpel where appropriate  The gallbladder was removed, via an endopouch, through the umbilical port  The liver bed was irrigated and inspected  Hemostasis was achieved with the electrocautery  Copious irrigation was utilized and was repeatedly aspirated until clear  Given the normal cholangiogram, elevated LFt's and severe cirrhotic appearance of the liver  A core needle liver biopsy was performed  Two passes were performed and specimen were sent to pathology  The biopsy sites were cauterized  Pneumoperitoneum was completely reduced after viewing removal of the trocars under direct vision  The wound was thoroughly irrigated and the umbilical hernia fascia defect was then closed with a figure of eight 2-0 Prolene sutures, The subcutaneous tissues were closed with 3-0 Vicryl, the skin was then closed with 4-0 monocryl and a sterile dressings were applied  Instrument, sponge, and needle counts were correct at closure and at the conclusion of the case  This text is generated with voice recognition software   There may be translation, syntax,  or grammatical errors  If you have any questions, please contact the dictating provider         Complications: None    Condition: Stable to PACU    SIGNATURE: Leonarda Castelan MD   DATE: June 26, 2018   TIME: 8:48 AM

## 2018-06-26 NOTE — ANESTHESIA PREPROCEDURE EVALUATION
Review of Systems/Medical History      History of anesthetic complications difficult airway    Cardiovascular  Hypertension , Aortic disease, PVD,    Pulmonary       GI/Hepatic    Liver disease , cirrhosis, Hepatitis Chronic,             Endo/Other     GYN       Hematology   Musculoskeletal       Neurology   Psychology           Physical Exam    Airway    Mallampati score: II  TM Distance: >3 FB       Dental       Cardiovascular  Rhythm: regular, Rate: normal,     Pulmonary  Breath sounds clear to auscultation,     Other Findings        Anesthesia Plan  ASA Score- 3     Anesthesia Type- general with ASA Monitors  Additional Monitors:   Airway Plan: ETT  Plan Factors-    Induction- intravenous  Postoperative Plan-     Informed Consent- Anesthetic plan and risks discussed with patient  I personally reviewed this patient with the CRNA  Discussed and agreed on the Anesthesia Plan with the CRNA  Saima Olmedo

## 2018-06-26 NOTE — H&P (VIEW-ONLY)
Assessment/Plan:  Bryon Matamoros is a 58 y o  male who presents today, after being admitted to [facility] from 6/3/18- 6/5/18 for acute abdominal pain secondary to cholelithiasis, to discuss cholecystectomy  On exam, his RUQ is non-tender  He has a small reducible umbilical hernia which will be repaired during his cholecystectomy  Discussed risks, benefits, and alternatives of laparoscopic cholecystectomy and open umbilical hernia repair without mesh along with pre- and post- operative protocol  Lifting restrictions of nothing greater than 15 lbs will be in place for weeks 1-2 after surgery  These will progress to 25 lbs during weeks 3-4  Light cardiovascular exercise may be resumed during weeks 3-4  He understands that his driving will be restricted while he is taking narcotic pain medication  He will schedule cholecystectomy for his earliest convenience  He will see his cardiologist to gain surgical clearance on 6/21/18  There are no diagnoses linked to this encounter  Subjective:      Patient ID: Bryon Matamoros is a 58 y o  male who presents today, after being admitted to [facility] from 6/3/18- 6/5/18 for acute abdominal pain secondary to cholelithiasis, to discuss cholecystectomy  He initially presented to the ER on 6/3/18 with severe RUQ pain radiating to his back  US and CTAP were performed on 6/3/18 identifying cholelithiasis without cholecystitis  CTAP also showed cirrhosis of the liver with prominent gastroesophageal varices  Since discharge on 6/5/18  He has reduced his fat and salt intake  He has felt well since discharge from the hospital  Denies any nausea, vomiting, fever, and chills         The following portions of the patient's history were reviewed and updated as appropriate: allergies, current medications, past family history, past medical history, past social history, past surgical history and problem list     Review of Systems Constitutional: Negative  HENT: Negative  Eyes: Negative  Respiratory: Negative  Cardiovascular: Negative  Gastrointestinal: Negative  Endocrine: Negative  Genitourinary: Negative  Musculoskeletal: Negative  Skin: Negative  Allergic/Immunologic: Negative  Neurological: Negative  Hematological: Negative  Psychiatric/Behavioral: Negative  All other systems reviewed and are negative  Objective:      /68 (BP Location: Left arm, Patient Position: Sitting, Cuff Size: Standard)   Pulse 76   Temp 99 4 °F (37 4 °C) (Tympanic)   Resp 16   Ht 5' 10" (1 778 m)   Wt 110 kg (243 lb 6 4 oz)   BMI 34 92 kg/m²          Physical Exam   Constitutional: He is oriented to person, place, and time  He appears well-developed and well-nourished  No distress  Obese  HENT:   Head: Normocephalic and atraumatic  Right Ear: External ear normal    Left Ear: External ear normal    Nose: Nose normal    Mouth/Throat: Oropharynx is clear and moist  No oropharyngeal exudate  Eyes: Conjunctivae and EOM are normal  Right eye exhibits no discharge  Left eye exhibits no discharge  No scleral icterus  Neck: Normal range of motion  Neck supple  No JVD present  No tracheal deviation present  No thyromegaly present  Cardiovascular: Normal rate, regular rhythm, normal heart sounds and intact distal pulses  Exam reveals no gallop and no friction rub  No murmur heard  Pulmonary/Chest: Effort normal and breath sounds normal  No stridor  No respiratory distress  He has no wheezes  He has no rales  He exhibits no tenderness  Abdominal: Soft  Bowel sounds are normal  He exhibits no distension and no mass  There is no tenderness  There is no rebound and no guarding  Non-tender RUQ  Small reducible umbilical hernia  Musculoskeletal: Normal range of motion  He exhibits no edema, tenderness or deformity  Lymphadenopathy:     He has no cervical adenopathy     Neurological: He is alert and oriented to person, place, and time  No cranial nerve deficit  Coordination normal    Skin: Skin is dry  No rash noted  He is not diaphoretic  No erythema  No pallor  Psychiatric: He has a normal mood and affect  His behavior is normal  Thought content normal    Nursing note and vitals reviewed  By signing my name below, Efrem Pennington, attest that this documentation has been prepared under the direction and in the presence of Arron Montero MD  Electonically Signed: Tye Martin, [address] [place] [address] 6/7/2018      I, Arron Montero, personally performed the services described in this documenation  All medical record entries made by the scribe were at my direction and in my presence  I have reviewed the chart and discharge instructions (if applicable) and agree that the record reflects my personal performance and is accurate and complete  Arron Montero MD  6/7/2018

## 2018-07-09 ENCOUNTER — OFFICE VISIT (OUTPATIENT)
Dept: SURGERY | Facility: HOSPITAL | Age: 63
End: 2018-07-09

## 2018-07-09 VITALS — BODY MASS INDEX: 33.43 KG/M2 | WEIGHT: 233 LBS | TEMPERATURE: 98.7 F

## 2018-07-09 DIAGNOSIS — Z09 POSTOP CHECK: Primary | ICD-10-CM

## 2018-07-09 PROCEDURE — 99024 POSTOP FOLLOW-UP VISIT: CPT | Performed by: SURGERY

## 2018-07-09 NOTE — PROGRESS NOTES
Assessment/Plan:    Reggie Boss is a 58 y o  male who presents today in postoperative follow-up status post laparoscopic cholecystectomy and umbilical hernia repair performed on 6/26/18  Physical examination revealed mild abdominal ecchymosis that is resolving as well as well healing wounds of the incisions  Discussed pathology of the gallbladder which revealed chronic cholecystitis, adenomyoma, one reactive lymph node  Also discussed that liver biopsy pathology revealed minimal steatosis and focal portal fibrosis as well as centrizonal sinusoidal fibrosis  Post-surgery restrictions were reviewed with patient: For the next two weeks, no heavy lifting greater than 25 pounds, but patient can introduce light cardiac activity, e g   elliptical and stationary bike  Following the fourth week from the date of surgery there are no restrictions but the patient should have common sense and be cognizant of pain, reducing activity as needed  He knows to contact our office should he have any problems or concerns  There are no diagnoses linked to this encounter  Subjective:      Patient ID: Reggie Boss is a 58 y o  male who presents today in postoperative follow-up status post laparoscopic cholecystectomy and umbilical hernia repair performed on 6/26/18  Patient reports that he is doing well and he has not had any postoperative issues  His appetite has returned to normal  He has a mild burning sensation at his surgical site which is not significant  Bowel and bladder habits are normal          The following portions of the patient's history were reviewed and updated as appropriate: allergies, current medications, past family history, past medical history, past social history, past surgical history and problem list     Review of Systems   Constitutional: Negative  Negative for appetite change  HENT: Negative  Eyes: Negative  Respiratory: Negative  Cardiovascular: Negative      Gastrointestinal: Negative  Negative for constipation, diarrhea, nausea and vomiting  Mild burning sensation at the surgical site   Endocrine: Negative  Genitourinary: Negative  Musculoskeletal: Negative  Skin: Negative  Allergic/Immunologic: Negative  Neurological: Negative  Hematological: Negative  Psychiatric/Behavioral: Negative  All other systems reviewed and are negative  Objective:      Temp 98 7 °F (37 1 °C) (Tympanic)   Wt 106 kg (233 lb)   BMI 33 43 kg/m²          Physical Exam   Constitutional: He is oriented to person, place, and time  He appears well-developed and well-nourished  No distress  obese   HENT:   Head: Normocephalic and atraumatic  Right Ear: External ear normal    Left Ear: External ear normal    Nose: Nose normal    Mouth/Throat: Oropharynx is clear and moist  No oropharyngeal exudate  Eyes: Conjunctivae and EOM are normal  Right eye exhibits no discharge  Left eye exhibits no discharge  No scleral icterus  Neck: Normal range of motion  Neck supple  No JVD present  No tracheal deviation present  No thyromegaly present  Cardiovascular: Normal rate, regular rhythm, normal heart sounds and intact distal pulses  Exam reveals no gallop and no friction rub  No murmur heard  Pulmonary/Chest: Effort normal and breath sounds normal  No stridor  No respiratory distress  He has no wheezes  He has no rales  He exhibits no tenderness  Abdominal: Soft  Bowel sounds are normal  He exhibits no distension and no mass  There is no tenderness  There is no rebound and no guarding  mild abdominal ecchymosis that is resolving as well as well healing wounds of the incisions   Musculoskeletal: Normal range of motion  He exhibits no edema, tenderness or deformity  Lymphadenopathy:     He has no cervical adenopathy  Neurological: He is alert and oriented to person, place, and time  No cranial nerve deficit  Coordination normal    Skin: Skin is dry  No rash noted   He is not diaphoretic  No erythema  No pallor  Psychiatric: He has a normal mood and affect  His behavior is normal  Thought content normal    Nursing note and vitals reviewed  Attestation:   By signing my name below, Jacklyn Tim, attest that this documentation has been prepared under the direction and in the presence of Monica Mancia MD  Electronically Signed: Jennifer Montez  07/09/18     I, Monica Mancia, personally performed the services described in this documentation  All medical record entries made by the scribe were at my direction and in my presence  I have reviewed the chart and discharge instructions and agree that the record reflects my personal performance and is accurate and complete    Monica Mancia MD  07/09/18

## 2018-08-15 DIAGNOSIS — I71.4 ABDOMINAL AORTIC ANEURYSM WITHOUT RUPTURE (HCC): Primary | ICD-10-CM

## 2018-08-23 ENCOUNTER — OFFICE VISIT (OUTPATIENT)
Dept: CARDIOLOGY CLINIC | Facility: CLINIC | Age: 63
End: 2018-08-23
Payer: COMMERCIAL

## 2018-08-23 VITALS
SYSTOLIC BLOOD PRESSURE: 134 MMHG | RESPIRATION RATE: 16 BRPM | HEART RATE: 64 BPM | HEIGHT: 70 IN | WEIGHT: 235 LBS | BODY MASS INDEX: 33.64 KG/M2 | DIASTOLIC BLOOD PRESSURE: 82 MMHG

## 2018-08-23 DIAGNOSIS — I70.90 ATHEROSCLEROSIS: Primary | ICD-10-CM

## 2018-08-23 PROCEDURE — 99214 OFFICE O/P EST MOD 30 MIN: CPT | Performed by: INTERNAL MEDICINE

## 2018-08-23 RX ORDER — HYDROCHLOROTHIAZIDE 12.5 MG/1
12.5 TABLET ORAL DAILY
Qty: 30 TABLET | Refills: 5 | Status: SHIPPED | OUTPATIENT
Start: 2018-08-23 | End: 2019-01-29

## 2018-08-23 RX ORDER — ASPIRIN 81 MG/1
81 TABLET ORAL DAILY
COMMUNITY

## 2018-08-23 RX ORDER — CHLORAL HYDRATE 500 MG
1000 CAPSULE ORAL DAILY
COMMUNITY

## 2018-08-23 RX ORDER — LISINOPRIL 40 MG/1
40 TABLET ORAL DAILY
Qty: 30 TABLET | Refills: 5 | Status: SHIPPED | OUTPATIENT
Start: 2018-08-23 | End: 2019-01-29

## 2018-08-23 NOTE — PROGRESS NOTES
[address] [facility] [place]  [address]  [facility] [address] [facility]  [phone]    Cardiology Follow up    Patient:  Zi Quiñonez  :  1955  MRN:  [id number]    History of Present Illness:     26-year-old man with past medical history of hypertension abdominal aortic aneurysm status post percutaneous repair, and cirrhosis with gastroesophageal varices noted on CT scan-etiology of cirrhosis appears to be MILLER presents for cardiology follow-up  He notes no chest pain, shortness of breath, palpitations, or syncope  He does note some dizziness if he gets dehydrated and drinks salt water if he feels his blood pressure is low  This happened a few weeks ago when he was working out in the hot weather  He did have his gallbladder out and is doing well after this  Patient Active Problem List   Diagnosis    AAA (abdominal aortic aneurysm) without rupture (HCC)    Hyperbilirubinemia    Transaminitis    Essential hypertension    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 ([address] )    Cholelithiasis       Past Surgical History  Past Surgical History:   Procedure Laterality Date    BIOPSY CORE NEEDLE N/A 2018    Procedure: BIOPSY CORE NEEDLE liver;  Surgeon: Margo Merritt MD;  [facility];  Service: General    CHOLANGIOGRAM N/A 2018    Procedure: CHOLANGIOGRAM intraop; Surgeon: Margo Merritt MD;  [facility];  Service: General    COLON SURGERY      colonoscopy    HERNIA REPAIR      KNEE ARTHROSCOPY      GA EVASC RPR DPLMNT AORTO-AORTIC NDGFT N/A 2018    Procedure: REPAIR ANEURYSM ENDOVASCULAR ABDOMINAL AORTIC  (EVAR);   Surgeon: Grace Ganser, MD;  [facility];  Service: Vascular    GA LAP,CHOLECYSTECTOMY N/A 2018    Procedure: CHOLECYSTECTOMY LAPAROSCOPIC;  Surgeon: Margo Merritt MD;  [facility];  Service: General    GA REPAIR UMBILICAL ANAZ,8+I/V,EQNCE N/A 2018    Procedure: REPAIR HERNIA UMBILICAL;  Surgeon: Claudia Barrera Vona Ormond, MD;  Location:  MAIN OR;  Service: General    ROTATOR CUFF REPAIR Right        Social History   Social History     Social History    Marital status: Single     Spouse name: N/A    Number of children: N/A    Years of education: N/A     Occupational History    Not on file  Social History Main Topics    Smoking status: Former Smoker     Quit date: 1990    Smokeless tobacco: Never Used    Alcohol use Yes      Comment: Ocassional, 1 beer a month    Drug use: No    Sexual activity: Not on file     Other Topics Concern    Not on file     Social History Narrative    No narrative on file        No Known Allergies    Family History   Family History   Problem Relation Age of Onset    Pancreatic cancer Mother     Diabetes Mother     Cancer Father     Diabetes Father        Review of Systems:  Review of Systems   Constitutional: Negative for chills, fatigue and fever  HENT: Negative for hearing loss and trouble swallowing  Eyes: Negative for pain  Respiratory: Negative for cough, chest tightness and shortness of breath  Cardiovascular: Negative for chest pain, palpitations and leg swelling  Gastrointestinal: Negative for abdominal pain, blood in stool, nausea and vomiting  Endocrine: Negative for cold intolerance and heat intolerance  Genitourinary: Negative for difficulty urinating, frequency and hematuria  Musculoskeletal: Negative for arthralgias and neck pain  Skin: Negative for rash  Allergic/Immunologic: Negative for environmental allergies  Neurological: Negative for dizziness, weakness and headaches  Hematological: Does not bruise/bleed easily  Psychiatric/Behavioral: Negative for decreased concentration and sleep disturbance  The patient is not nervous/anxious            Current Outpatient Prescriptions:     albuterol (PROAIR HFA) 90 mcg/act inhaler, 6 (six) times a day, Disp: , Rfl:     aspirin (ECOTRIN LOW STRENGTH) 81 mg EC tablet, Take 81 mg by mouth daily, Disp: , Rfl:     citalopram (CeleXA) 40 mg tablet, every 24 hours, Disp: , Rfl:     hydrochlorothiazide (HYDRODIURIL) 25 mg tablet, Take 25 mg by mouth daily As needed , Disp: , Rfl:     lisinopril (ZESTRIL) 20 mg tablet, Take 1 tablet (20 mg total) by mouth daily, Disp: , Rfl: 0    LORazepam (ATIVAN) 0 5 mg tablet, 1 tablet, Disp: , Rfl:     Multiple Vitamin (MULTIVITAMIN) capsule, Take 1 capsule by mouth daily, Disp: , Rfl:     Omega-3 Fatty Acids (FISH OIL) 1,000 mg, Take 1,000 mg by mouth daily, Disp: , Rfl:     sildenafil (REVATIO) 20 mg tablet, every 24 hours, Disp: , Rfl:     zolpidem (AMBIEN) 10 mg tablet, Take 10 mg by mouth daily at bedtime as needed  , Disp: , Rfl:      Physical Exam:    Vitals:    08/23/18 0903   BP: 134/82   Pulse: 64   Resp: 16   Weight: 107 kg (235 lb)   Height: 5' 10" (1 778 m)       Physical Exam   Constitutional: He is oriented to person, place, and time  He appears well-developed and well-nourished  HENT:   Head: Normocephalic  Right Ear: External ear normal    Left Ear: External ear normal    Mouth/Throat: Oropharynx is clear and moist    Eyes: Pupils are equal, round, and reactive to light  Neck: No JVD present  Carotid bruit is not present  Cardiovascular: Normal rate, regular rhythm and intact distal pulses  Exam reveals no gallop and no friction rub  Murmur heard  Pulmonary/Chest: Effort normal and breath sounds normal  No tachypnea  No respiratory distress  He has no wheezes  He has no rales  He exhibits no tenderness  Abdominal: Soft  He exhibits no distension  There is no tenderness  There is no rebound and no guarding  Musculoskeletal: He exhibits no edema  Neurological: He is alert and oriented to person, place, and time  Skin: Skin is warm and dry  Psychiatric: He has a normal mood and affect  His behavior is normal  Judgment and thought content normal    Nursing note and vitals reviewed        Labs:not applicable    Assessment/Plan:    1  Prevention of vascular disease-he does have an abdominal aortic aneurysm repair with evidence of atherosclerosis  He is back on aspirin  I would like to check fasting lipids and strongly consider statin for him if it is okay from a gastroenterology standpoint  2   Hypertension-I would like to increase his lisinopril to 40 and decrease hydrochlorothiazide to 12 5  His goal blood pressures less than 130 millimeter systolic  3   Systolic murmur-I would like to get an echocardiogram-this is likely benign  I would like to see him back in about 4 weeks and will follow up on the lipids as well as get the echocardiogram at that time and see how his blood pressure is as well  Thank you so much, please do not hesitate to contact me with any questions or concerns        Shadi Pérez MD  8/23/2018  9:18 AM

## 2018-08-23 NOTE — LETTER
2018     Ami Cammie, [address]  [address]  [facility] [address]    Patient: Rachael Holcomb   YOB: 1955   Date of Visit: 2018       Dear Dr  [facility]:    Thank you for referring Eduardo Sanchez to me for evaluation  Below are my notes for this consultation  If you have questions, please do not hesitate to call me  I look forward to following your patient along with you  Sincerely,        Amie Hung MD        CC: No Recipients  Amie Hung MD  2018  9:43 AM  Sign at close encounter  [address] Cardiology [facility] AT [place] [address], 05 [facility]  [phone]    Cardiology Follow up    Patient:  Rachael Holcomb  :  1955  MRN:  [id number]    History of Present Illness:     44-year-old man with past medical history of hypertension abdominal aortic aneurysm status post percutaneous repair, and cirrhosis with gastroesophageal varices noted on CT scan-etiology of cirrhosis appears to be MILLER presents for cardiology follow-up  He notes no chest pain, shortness of breath, palpitations, or syncope  He does note some dizziness if he gets dehydrated and drinks salt water if he feels his blood pressure is low  This happened a few weeks ago when he was working out in the hot weather  He did have his gallbladder out and is doing well after this  Patient Active Problem List   Diagnosis    AAA (abdominal aortic aneurysm) without rupture (HCC)    Hyperbilirubinemia    Transaminitis    Essential hypertension    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 ([address] )    Cholelithiasis       Past Surgical History  Past Surgical History:   Procedure Laterality Date    BIOPSY CORE NEEDLE N/A 2018    Procedure: BIOPSY CORE NEEDLE liver;  Surgeon: Fanta Jessica MD;  Location:  MAIN OR;  Service: General    CHOLANGIOGRAM N/A 2018    Procedure: CHOLANGIOGRAM intraop;   Surgeon: Fanta Jessica MD;  Location:  MAIN OR;  Service: General    COLON SURGERY      colonoscopy    HERNIA REPAIR      KNEE ARTHROSCOPY      GA EVASC RPR DPLMNT AORTO-AORTIC NDGFT N/A 4/23/2018    Procedure: REPAIR ANEURYSM ENDOVASCULAR ABDOMINAL AORTIC  (EVAR); Surgeon: Colleen Dempsey MD;  Location: [facility] MAIN OR;  Service: Vascular    GA LAP,CHOLECYSTECTOMY N/A 6/26/2018    Procedure: Nereyda Fernandoney;  Surgeon: Magi Langston MD;  Location: [facility] MAIN OR;  Service: General    GA REPAIR UMBILICAL BUEJ,0+U/D,GWBCE N/A 6/26/2018    Procedure: REPAIR HERNIA UMBILICAL;  Surgeon: Magi Langston MD;  Location: [facility] OR;  Service: General    ROTATOR CUFF REPAIR Right        Social History   Social History     Social History    Marital status: Single     Spouse name: N/A    Number of children: N/A    Years of education: N/A     Occupational History    Not on file  Social History Main Topics    Smoking status: Former Smoker     Quit date: 1990    Smokeless tobacco: Never Used    Alcohol use Yes      Comment: Ocassional, 1 beer a month    Drug use: No    Sexual activity: Not on file     Other Topics Concern    Not on file     Social History Narrative    No narrative on file        No Known Allergies    Family History   Family History   Problem Relation Age of Onset    Pancreatic cancer Mother     Diabetes Mother     Cancer Father     Diabetes Father        Review of Systems:  Review of Systems   Constitutional: Negative for chills, fatigue and fever  HENT: Negative for hearing loss and trouble swallowing  Eyes: Negative for pain  Respiratory: Negative for cough, chest tightness and shortness of breath  Cardiovascular: Negative for chest pain, palpitations and leg swelling  Gastrointestinal: Negative for abdominal pain, blood in stool, nausea and vomiting  Endocrine: Negative for cold intolerance and heat intolerance  Genitourinary: Negative for difficulty urinating, frequency and hematuria     Musculoskeletal: Negative for arthralgias and neck pain  Skin: Negative for rash  Allergic/Immunologic: Negative for environmental allergies  Neurological: Negative for dizziness, weakness and headaches  Hematological: Does not bruise/bleed easily  Psychiatric/Behavioral: Negative for decreased concentration and sleep disturbance  The patient is not nervous/anxious  Current Outpatient Prescriptions:     albuterol (PROAIR HFA) 90 mcg/act inhaler, 6 (six) times a day, Disp: , Rfl:     aspirin (ECOTRIN LOW STRENGTH) 81 mg EC tablet, Take 81 mg by mouth daily, Disp: , Rfl:     citalopram (CeleXA) 40 mg tablet, every 24 hours, Disp: , Rfl:     hydrochlorothiazide (HYDRODIURIL) 25 mg tablet, Take 25 mg by mouth daily As needed , Disp: , Rfl:     lisinopril (ZESTRIL) 20 mg tablet, Take 1 tablet (20 mg total) by mouth daily, Disp: , Rfl: 0    LORazepam (ATIVAN) 0 5 mg tablet, 1 tablet, Disp: , Rfl:     Multiple Vitamin (MULTIVITAMIN) capsule, Take 1 capsule by mouth daily, Disp: , Rfl:     Omega-3 Fatty Acids (FISH OIL) 1,000 mg, Take 1,000 mg by mouth daily, Disp: , Rfl:     sildenafil (REVATIO) 20 mg tablet, every 24 hours, Disp: , Rfl:     zolpidem (AMBIEN) 10 mg tablet, Take 10 mg by mouth daily at bedtime as needed  , Disp: , Rfl:      Physical Exam:    Vitals:    08/23/18 0903   BP: 134/82   Pulse: 64   Resp: 16   Weight: 107 kg (235 lb)   Height: 5' 10" (1 778 m)       Physical Exam   Constitutional: He is oriented to person, place, and time  He appears well-developed and well-nourished  HENT:   Head: Normocephalic  Right Ear: External ear normal    Left Ear: External ear normal    Mouth/Throat: Oropharynx is clear and moist    Eyes: Pupils are equal, round, and reactive to light  Neck: No JVD present  Carotid bruit is not present  Cardiovascular: Normal rate, regular rhythm and intact distal pulses  Exam reveals no gallop and no friction rub  Murmur heard    Pulmonary/Chest: Effort normal and breath sounds normal  No tachypnea  No respiratory distress  He has no wheezes  He has no rales  He exhibits no tenderness  Abdominal: Soft  He exhibits no distension  There is no tenderness  There is no rebound and no guarding  Musculoskeletal: He exhibits no edema  Neurological: He is alert and oriented to person, place, and time  Skin: Skin is warm and dry  Psychiatric: He has a normal mood and affect  His behavior is normal  Judgment and thought content normal    Nursing note and vitals reviewed  Labs:not applicable    Assessment/Plan:    1  Prevention of vascular disease-he does have an abdominal aortic aneurysm repair with evidence of atherosclerosis  He is back on aspirin  I would like to check fasting lipids and strongly consider statin for him if it is okay from a gastroenterology standpoint  2   Hypertension-I would like to increase his lisinopril to 40 and decrease hydrochlorothiazide to 12 5  His goal blood pressures less than 130 millimeter systolic  3   Systolic murmur-I would like to get an echocardiogram-this is likely benign  I would like to see him back in about 4 weeks and will follow up on the lipids as well as get the echocardiogram at that time and see how his blood pressure is as well  Thank you so much, please do not hesitate to contact me with any questions or concerns        David Lorenzo MD  8/23/2018  9:18 AM

## 2018-09-24 ENCOUNTER — APPOINTMENT (OUTPATIENT)
Dept: LAB | Facility: CLINIC | Age: 63
End: 2018-09-24
Payer: COMMERCIAL

## 2018-09-24 DIAGNOSIS — I70.90 ATHEROSCLEROSIS: ICD-10-CM

## 2018-09-24 LAB
CHOLEST SERPL-MCNC: 165 MG/DL (ref 50–200)
HDLC SERPL-MCNC: 47 MG/DL (ref 40–60)
LDLC SERPL CALC-MCNC: 98 MG/DL (ref 0–100)
NONHDLC SERPL-MCNC: 118 MG/DL
TRIGL SERPL-MCNC: 100 MG/DL

## 2018-09-24 PROCEDURE — 83704 LIPOPROTEIN BLD QUAN PART: CPT

## 2018-09-24 PROCEDURE — 80061 LIPID PANEL: CPT

## 2018-09-24 PROCEDURE — 36415 COLL VENOUS BLD VENIPUNCTURE: CPT

## 2018-09-27 ENCOUNTER — HOSPITAL ENCOUNTER (OUTPATIENT)
Dept: NON INVASIVE DIAGNOSTICS | Facility: CLINIC | Age: 63
Discharge: HOME | End: 2018-09-27
Payer: COMMERCIAL

## 2018-09-27 ENCOUNTER — OFFICE VISIT (OUTPATIENT)
Dept: CARDIOLOGY CLINIC | Facility: CLINIC | Age: 63
End: 2018-09-27
Payer: COMMERCIAL

## 2018-09-27 VITALS
HEART RATE: 60 BPM | DIASTOLIC BLOOD PRESSURE: 68 MMHG | WEIGHT: 232 LBS | SYSTOLIC BLOOD PRESSURE: 122 MMHG | HEIGHT: 70 IN | RESPIRATION RATE: 16 BRPM | BODY MASS INDEX: 33.21 KG/M2

## 2018-09-27 DIAGNOSIS — I70.90 ATHEROSCLEROSIS: ICD-10-CM

## 2018-09-27 DIAGNOSIS — I70.90 ATHEROSCLEROSIS: Primary | ICD-10-CM

## 2018-09-27 LAB
CHOLEST SERPL-MCNC: 174 MG/DL (ref 100–199)
HDL SERPL-SCNC: 21.3 UMOL/L
HDLC SERPL-MCNC: 47 MG/DL
LDL SERPL QN: 21.9 NM
LDL SERPL QN: 259 NMOL/L
LDL SERPL-SCNC: 1294 NMOL/L
LDLC SERPL CALC-MCNC: 106 MG/DL (ref 0–99)
LP-IR SCORE SERPL: <25
TRIGL SERPL-MCNC: 107 MG/DL (ref 0–149)

## 2018-09-27 PROCEDURE — 99214 OFFICE O/P EST MOD 30 MIN: CPT | Performed by: INTERNAL MEDICINE

## 2018-09-27 PROCEDURE — 93306 TTE W/DOPPLER COMPLETE: CPT | Performed by: INTERNAL MEDICINE

## 2018-09-27 PROCEDURE — 93306 TTE W/DOPPLER COMPLETE: CPT

## 2018-09-27 NOTE — LETTER
2018     Cinda Tangn, [address]  [address]  [facility] [address]    Patient: Tony Cameron   YOB: 1955   Date of Visit: 2018       Dear Dr Joaquin Alfredo:    Thank you for referring Kavin Browne to me for evaluation  Below are my notes for this consultation  If you have questions, please do not hesitate to call me  I look forward to following your patient along with you  Sincerely,        Marleny Norman MD        CC: No Recipients  Marleny Norman MD  2018  1:50 PM  Sign at close encounter  [facility] Cardiology [place]  [address]  [facility] [address] [facility]  [phone]    Cardiology Follow up    Patient:  Tony Cameron  :  1955  MRN:  [id number]    History of Present Illness:     79-year-old man with past medical history of hypertension abdominal aortic aneurysm status post percutaneous repair, and cirrhosis with gastroesophageal varices noted on CT scan-etiology of cirrhosis appears to be MILLER presents for cardiology follow-up  He feels relatively well from a cardiovascular symptom standpoint denying complaints of chest pain, shortness of breath, palpitations, dizziness, or syncope    He notes he has had significant stress 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[employer]  He notes he has had across [address] [facility] how much she wants to work verses enjoying more time  He is going for colonoscopy on Tuesday          Patient Active Problem List   Diagnosis    AAA (abdominal aortic aneurysm) without rupture (HCC)    Hyperbilirubinemia    Transaminitis    Essential hypertension    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 ([facility] [address]    Cholelithiasis       Past Surgical History  Past Surgical History:   Procedure Laterality Date    BIOPSY CORE NEEDLE N/A 2018    Procedure: BIOPSY CORE NEEDLE liver;  Surgeon: Laurent Vizcaino MD;  Location:  MAIN OR;  Service: General    CHOLANGIOGRAM N/A 6/26/2018    Procedure: CHOLANGIOGRAM intraop; Surgeon: Baird Councilman, MD;  Location: [facility];  Service: General    COLON SURGERY      colonoscopy    HERNIA REPAIR      KNEE ARTHROSCOPY      NC EVASC RPR DPLMNT AORTO-AORTIC NDGFT N/A 4/23/2018    Procedure: REPAIR ANEURYSM ENDOVASCULAR ABDOMINAL AORTIC  (EVAR); Surgeon: Hardeep Coleman MD;  Location: [facility] MA[facility] OR;  Service: Vascular    NC LAP,CHOLECYSTECTOMY N/A 6/26/2018    Procedure: Meera Baxterer;  Surgeon: Baird Councilman, MD;  Location: [facility];  Service: General    NC REPAIR UMBILICAL PBBP,8+D/M,KJMFK N/A 6/26/2018    Procedure: REPAIR HERNIA UMBILICAL;  Surgeon: Baird Councilman, MD;  Location: [facility];  Service: General    ROTATOR CUFF REPAIR Right        Social History   Social History     Social History    Marital status: Single     Spouse name: N/A    Number of children: N/A    Years of education: N/A     Occupational History    Not on file  Social History Main Topics    Smoking status: Former Smoker     Quit date: 1990    Smokeless tobacco: Never Used    Alcohol use Yes      Comment: Ocassional, 1 beer a month    Drug use: No    Sexual activity: Not on file     Other Topics Concern    Not on file     Social History Narrative    No narrative on file        No Known Allergies    Family History   Family History   Problem Relation Age of Onset    Pancreatic cancer Mother     Diabetes Mother     Cancer Father     Diabetes Father        Review of Systems:  Review of Systems   Constitutional: Negative for chills, fatigue and fever  HENT: Negative for hearing loss and trouble swallowing  Eyes: Negative for pain  Respiratory: Negative for cough, chest tightness and shortness of breath  Cardiovascular: Negative for chest pain, palpitations and leg swelling  Gastrointestinal: Negative for abdominal pain, blood in stool, nausea and vomiting     Endocrine: Negative for cold intolerance and heat intolerance  Genitourinary: Negative for difficulty urinating, frequency and hematuria  Musculoskeletal: Negative for arthralgias and neck pain  Skin: Negative for rash  Allergic/Immunologic: Negative for environmental allergies  Neurological: Negative for dizziness, weakness and headaches  Hematological: Does not bruise/bleed easily  Psychiatric/Behavioral: Negative for decreased concentration and sleep disturbance  The patient is not nervous/anxious  Current Outpatient Prescriptions:     albuterol (PROAIR HFA) 90 mcg/act inhaler, 6 (six) times a day, Disp: , Rfl:     aspirin (ECOTRIN LOW STRENGTH) 81 mg EC tablet, Take 81 mg by mouth daily, Disp: , Rfl:     citalopram (CeleXA) 40 mg tablet, every 24 hours, Disp: , Rfl:     hydrochlorothiazide (HYDRODIURIL) 12 5 mg tablet, Take 1 tablet (12 5 mg total) by mouth daily As needed, Disp: 30 tablet, Rfl: 5    lisinopril (ZESTRIL) 40 mg tablet, Take 1 tablet (40 mg total) by mouth daily, Disp: 30 tablet, Rfl: 5    LORazepam (ATIVAN) 0 5 mg tablet, 1 tablet, Disp: , Rfl:     Multiple Vitamin (MULTIVITAMIN) capsule, Take 1 capsule by mouth daily, Disp: , Rfl:     Omega-3 Fatty Acids (FISH OIL) 1,000 mg, Take 1,000 mg by mouth daily, Disp: , Rfl:     sildenafil (REVATIO) 20 mg tablet, every 24 hours, Disp: , Rfl:     zolpidem (AMBIEN) 10 mg tablet, Take 10 mg by mouth daily at bedtime as needed  , Disp: , Rfl:      Physical Exam:    Vitals:    09/27/18 1310   BP: 122/68   Pulse: 60   Resp: 16   Weight: 105 kg (232 lb)   Height: 5' 10" (1 778 m)       Physical Exam   Constitutional: He is oriented to person, place, and time  He appears well-developed and well-nourished  HENT:   Head: Normocephalic  Right Ear: External ear normal    Left Ear: External ear normal    Mouth/Throat: Oropharynx is clear and moist    Eyes: Pupils are equal, round, and reactive to light  Neck: No JVD present  Carotid bruit is not present     Cardiovascular: Normal rate, regular rhythm and intact distal pulses  Exam reveals no gallop and no friction rub  Murmur heard  Pulmonary/Chest: Effort normal and breath sounds normal  No tachypnea  No respiratory distress  He has no wheezes  He has no rales  He exhibits no tenderness  Abdominal: Soft  He exhibits no distension  There is no tenderness  There is no rebound and no guarding  Musculoskeletal: He exhibits no edema  Neurological: He is alert and oriented to person, place, and time  Skin: Skin is warm and dry  Psychiatric: He has a normal mood and affect  His behavior is normal  Judgment and thought content normal    Nursing note and vitals reviewed  Labs:not applicable    Assessment/Plan:    1  Basal septal hypertrophy with systolic anterior motion of the mitral valve and mitral regurgitation and left ventricular outflow tract gradient  I would like him to follow up with Dr Selena Coles in [facility]  2   Hypertension-he continues on the lisinopril as well as the hydrochlorothiazide  I did ask him to make sure he remains hydrated given his left ventricular outflow tract gradient  3   Evidence of atherosclerosis with abdominal aortic aneurysm-I would like him on Lipitor if this is acceptable from a hepatology standpoint  He is going to meet with the gastroenterologist in the near future and will contact me after this  Once he is on Lipitor would like to check fasting lipids as well as nmr lipid profile lipoprotein (a) before the next visit  I would see him back in about 3 months for follow-up  Thank you so much, please do not hesitate to contact me if there any questions or concerns            Donette Olszewski, MD  9/27/2018  1:19 PM

## 2018-09-27 NOTE — PROGRESS NOTES
[address] Cardiology [place]  [address]  [facility], [address] [facility]  [phone]    Cardiology Follow up    Patient:  Erik Adams  :  1955  MRN:  [id number]    History of Present Illness:     27-year-old man with past medical history of hypertension abdominal aortic aneurysm status post percutaneous repair, and cirrhosis with gastroesophageal varices noted on CT scan-etiology of cirrhosis appears to be MILLER presents for cardiology follow-up  He feels relatively well from a cardiovascular symptom standpoint denying complaints of chest pain, shortness of breath, palpitations, dizziness, or syncope    He notes he has had significant stress 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[employer]  He notes he has had across [address] how much she wants to work verses enjoying more time  He is going for colonoscopy on Tuesday  Patient Active Problem List   Diagnosis    AAA (abdominal aortic aneurysm) without rupture (HCC)    Hyperbilirubinemia    Transaminitis    Essential hypertension    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 ([address] )    Cholelithiasis       Past Surgical History  Past Surgical History:   Procedure Laterality Date    BIOPSY CORE NEEDLE N/A 2018    Procedure: BIOPSY CORE NEEDLE liver;  Surgeon: Nacho Ash MD;  Location: [facility];  Service: General    CHOLANGIOGRAM N/A 2018    Procedure: CHOLANGIOGRAM intraop; Surgeon: Nacho Ash MD;  Location: [facility];  Service: General    COLON SURGERY      colonoscopy    HERNIA REPAIR      KNEE ARTHROSCOPY      MN EVASC RPR DPLMNT AORTO-AORTIC NDGFT N/A 2018    Procedure: REPAIR ANEURYSM ENDOVASCULAR ABDOMINAL AORTIC  (EVAR);   Surgeon: Atul Lopez MD;  Location: [facility] OR;  Service: Vascular    MN LAP,CHOLECYSTECTOMY N/A 2018    Procedure: Latha Primas;  Surgeon: Nacho Ash MD;  Location: [facility];  Service: General  [place] REPAIR UMBILICAL HCRG,5+Y/U,NBZMF N/A 6/26/2018    Procedure: REPAIR HERNIA UMBILICAL;  Surgeon: Jen Gorman MD;  Location:  MAIN OR;  Service: General    ROTATOR CUFF REPAIR Right        Social History   Social History     Social History    Marital status: Single     Spouse name: N/A    Number of children: N/A    Years of education: N/A     Occupational History    Not on file  Social History Main Topics    Smoking status: Former Smoker     Quit date: 1990    Smokeless tobacco: Never Used    Alcohol use Yes      Comment: Ocassional, 1 beer a month    Drug use: No    Sexual activity: Not on file     Other Topics Concern    Not on file     Social History Narrative    No narrative on file        No Known Allergies    Family History   Family History   Problem Relation Age of Onset    Pancreatic cancer Mother     Diabetes Mother     Cancer Father     Diabetes Father        Review of Systems:  Review of Systems   Constitutional: Negative for chills, fatigue and fever  HENT: Negative for hearing loss and trouble swallowing  Eyes: Negative for pain  Respiratory: Negative for cough, chest tightness and shortness of breath  Cardiovascular: Negative for chest pain, palpitations and leg swelling  Gastrointestinal: Negative for abdominal pain, blood in stool, nausea and vomiting  Endocrine: Negative for cold intolerance and heat intolerance  Genitourinary: Negative for difficulty urinating, frequency and hematuria  Musculoskeletal: Negative for arthralgias and neck pain  Skin: Negative for rash  Allergic/Immunologic: Negative for environmental allergies  Neurological: Negative for dizziness, weakness and headaches  Hematological: Does not bruise/bleed easily  Psychiatric/Behavioral: Negative for decreased concentration and sleep disturbance  The patient is not nervous/anxious            Current Outpatient Prescriptions:     albuterol (PROAIR HFA) 90 mcg/act inhaler, 6 (six) times a day, Disp: , Rfl:     aspirin (ECOTRIN LOW STRENGTH) 81 mg EC tablet, Take 81 mg by mouth daily, Disp: , Rfl:     citalopram (CeleXA) 40 mg tablet, every 24 hours, Disp: , Rfl:     hydrochlorothiazide (HYDRODIURIL) 12 5 mg tablet, Take 1 tablet (12 5 mg total) by mouth daily As needed, Disp: 30 tablet, Rfl: 5    lisinopril (ZESTRIL) 40 mg tablet, Take 1 tablet (40 mg total) by mouth daily, Disp: 30 tablet, Rfl: 5    LORazepam (ATIVAN) 0 5 mg tablet, 1 tablet, Disp: , Rfl:     Multiple Vitamin (MULTIVITAMIN) capsule, Take 1 capsule by mouth daily, Disp: , Rfl:     Omega-3 Fatty Acids (FISH OIL) 1,000 mg, Take 1,000 mg by mouth daily, Disp: , Rfl:     sildenafil (REVATIO) 20 mg tablet, every 24 hours, Disp: , Rfl:     zolpidem (AMBIEN) 10 mg tablet, Take 10 mg by mouth daily at bedtime as needed  , Disp: , Rfl:      Physical Exam:    Vitals:    09/27/18 1310   BP: 122/68   Pulse: 60   Resp: 16   Weight: 105 kg (232 lb)   Height: 5' 10" (1 778 m)       Physical Exam   Constitutional: He is oriented to person, place, and time  He appears well-developed and well-nourished  HENT:   Head: Normocephalic  Right Ear: External ear normal    Left Ear: External ear normal    Mouth/Throat: Oropharynx is clear and moist    Eyes: Pupils are equal, round, and reactive to light  Neck: No JVD present  Carotid bruit is not present  Cardiovascular: Normal rate, regular rhythm and intact distal pulses  Exam reveals no gallop and no friction rub  Murmur heard  Pulmonary/Chest: Effort normal and breath sounds normal  No tachypnea  No respiratory distress  He has no wheezes  He has no rales  He exhibits no tenderness  Abdominal: Soft  He exhibits no distension  There is no tenderness  There is no rebound and no guarding  Musculoskeletal: He exhibits no edema  Neurological: He is alert and oriented to person, place, and time  Skin: Skin is warm and dry     Psychiatric: He has a normal mood and affect  His behavior is normal  Judgment and thought content normal    Nursing note and vitals reviewed  Labs:not applicable    Assessment/Plan:    1  Basal septal hypertrophy with systolic anterior motion of the mitral valve and mitral regurgitation and left ventricular outflow tract gradient  I would like him to follow up with Dr Satinder Ewing in [place]  2   Hypertension-he continues on the lisinopril as well as the hydrochlorothiazide  I did ask him to make sure he remains hydrated given his left ventricular outflow tract gradient  3   Evidence of atherosclerosis with abdominal aortic aneurysm-I would like him on Lipitor if this is acceptable from a hepatology standpoint  He is going to meet with the gastroenterologist in the near future and will contact me after this  Once he is on Lipitor would like to check fasting lipids as well as nmr lipid profile lipoprotein (a) before the next visit  4 Mild aortic root dilatation-this is noted  I would see him back in about 3 months for follow-up  Thank you so much, please do not hesitate to contact me if there any questions or concerns            Priscila Calvillo MD  9/27/2018  1:19 PM

## 2018-10-09 DIAGNOSIS — E78.5 HYPERLIPIDEMIA, UNSPECIFIED HYPERLIPIDEMIA TYPE: Primary | ICD-10-CM

## 2018-10-16 RX ORDER — ATORVASTATIN CALCIUM 20 MG/1
20 TABLET, FILM COATED ORAL
Qty: 30 TABLET | Refills: 2 | Status: SHIPPED | OUTPATIENT
Start: 2018-10-16 | End: 2019-02-21

## 2018-10-16 NOTE — TELEPHONE ENCOUNTER
Pt would like to start this atorvastatin, but Rx was not at the pharmacy  It is still pending and not on his medication list please process  Thanks

## 2018-11-29 ENCOUNTER — TELEPHONE (OUTPATIENT)
Dept: CARDIOLOGY CLINIC | Facility: CLINIC | Age: 63
End: 2018-11-29

## 2018-11-29 NOTE — TELEPHONE ENCOUNTER
Phone call from patient informing our office that he has been having other medical issues and sees Dr Sulaiman Rawls a gastroenterologist   He is not taking his Lipitor at this time until his other issues have resolved    Just AB

## 2018-12-05 RX ORDER — SODIUM PICOSULFATE, MAGNESIUM OXIDE, AND ANHYDROUS CITRIC ACID 10; 3.5; 12 MG/160ML; G/160ML; G/160ML
LIQUID ORAL
Refills: 0 | COMMUNITY
Start: 2018-09-25 | End: 2020-09-10 | Stop reason: ALTCHOICE

## 2018-12-06 ENCOUNTER — APPOINTMENT (OUTPATIENT)
Dept: RADIOLOGY | Facility: CLINIC | Age: 63
End: 2018-12-06
Payer: COMMERCIAL

## 2018-12-06 ENCOUNTER — TRANSCRIBE ORDERS (OUTPATIENT)
Dept: ADMINISTRATIVE | Facility: HOSPITAL | Age: 63
End: 2018-12-06

## 2018-12-06 DIAGNOSIS — R05.9 COUGH: Primary | ICD-10-CM

## 2018-12-06 DIAGNOSIS — R05.9 COUGH: ICD-10-CM

## 2018-12-06 PROCEDURE — 71046 X-RAY EXAM CHEST 2 VIEWS: CPT

## 2018-12-10 ENCOUNTER — OFFICE VISIT (OUTPATIENT)
Dept: PULMONOLOGY | Facility: CLINIC | Age: 63
End: 2018-12-10
Payer: COMMERCIAL

## 2018-12-10 VITALS
BODY MASS INDEX: 32.93 KG/M2 | SYSTOLIC BLOOD PRESSURE: 142 MMHG | HEIGHT: 70 IN | TEMPERATURE: 97.9 F | RESPIRATION RATE: 16 BRPM | WEIGHT: 230 LBS | DIASTOLIC BLOOD PRESSURE: 90 MMHG | HEART RATE: 65 BPM | OXYGEN SATURATION: 95 %

## 2018-12-10 DIAGNOSIS — I51.89 DIASTOLIC DYSFUNCTION: Primary | ICD-10-CM

## 2018-12-10 PROBLEM — R04.2 HEMOPTYSIS: Status: ACTIVE | Noted: 2018-12-10

## 2018-12-10 PROCEDURE — 99245 OFF/OP CONSLTJ NEW/EST HI 55: CPT | Performed by: INTERNAL MEDICINE

## 2018-12-10 NOTE — LETTER
December 10, 2018     Ligia Javier, [address]  [address]  [facility] [address]    Patient: Landon Villalobos   YOB: 1955   Date of Visit: 12/10/2018       Dear Dr Jamey Mosqueda:    Thank you for referring Sally Luque to me for evaluation  Below are my notes for this consultation  If you have questions, please do not hesitate to call me  I look forward to following your patient along with you  Sincerely,        Summer Elliott MD        CC: MD Summer Lewis MD  12/10/2018 11:28 AM  Sign at close encounter  Pulmonary Initial Visit  Landon Villalobos 58 y o  male MRN: [id number]  @ Encounter: [id number]      Impressions/Recommendations:   Patient is a 48-year-old male with past medical history significant for AAA status post repair, MILLER who presents for initial pulmonary evaluation  The patient's primary concern was related to hemoptysis  Per the patient's description, he had an isolated episode approximately 6 weeks ago which has since improved over the past several weeks  He reports no evidence of hemoptysis or blood streaked sputum this week  It is unclear exactly what the underlying cause for this but there was no major concerning factors presented by the patient  I told the patient that if he has a change in character quantity or characteristic of the mop this that he should call my office immediately at which point we will obtain a high-resolution CT scan with supine and prone images given the dependent atelectasis  We will hold on this imaging until the patient has a change in his symptoms  The patient denies any significant shortness of breath  He reports he was able to walk 90 blocks recently in [place] at a normal pace without significant dyspnea  Given this stability, there is no indication for pulmonary function testing      The patient does have evidence of diastolic dysfunction as noted on his recent echocardiogram as well as slight effusion on previous CT imaging  On physical exam, the patient did have 2+ pitting lower extremity edema  He does not endorse any significant respiratory difficulties but I have encouraged him to follow up with his primary cardiologist for possible diuresis and continued monitoring of this diastolic dysfunction  The patient may follow up on as-needed basis  History of Present Illness   HPI:  Fernando Khan is a 58 y o  male with pmhx sig for MILLER, AAA s/p repair who presents for initial pulmonary evaluation  The patient noted difficulty with working out  He did also notice a period of hemoptysis about 6 weeks ago  Since that point, he has noted slight periods of blood tinged sputum  Generally, the hemoptysis is improving  Currently, he reports his breathing feels normal       In the spring, he did report shortness of breath at that time  Over the summer, the patient felt as if he has water on his lungs  He also noted significant swelling in his legs with edema, but that feels better now  He does notice occasional wheezing but does not use an albuterol inhaler  The patient also does report concern as 2 friends were recently diagnosed with lung cancer  Review of systems:  12 point review of systems was completed and was otherwise negative except as listed in HPI      Historical Information   Past Medical History:   Diagnosis Date    Abdominal aortic aneurysm (AAA) ([address] )     Cancer ([facility] [address]     skin    H/O shoulder surgery     Hard to intubate     Grade 4 view with MAC 4, suggest video laryngoscope in future    Hypertension     Liver disease     non- alcoholic    MILLER (nonalcoholic steatohepatitis)     PONV (postoperative nausea and vomiting)      Past Surgical History:   Procedure Laterality Date    BIOPSY CORE NEEDLE N/A 6/26/2018    Procedure: BIOPSY CORE NEEDLE liver;  Surgeon: Roxana Gomez MD;  Location:  MAIN OR;  Service: General    CHOLANGIOGRAM N/A 6/26/2018 Procedure: CHOLANGIOGRAM intraop; Surgeon: Fidel Hidalgo MD;  [facility];  Service: General    CHOLECYSTECTOMY      COLON SURGERY      colonoscopy    HERNIA REPAIR      KNEE ARTHROSCOPY      OK EVASC RPR DPLMNT AORTO-AORTIC NDGFT N/A 4/23/2018    Procedure: REPAIR ANEURYSM ENDOVASCULAR ABDOMINAL AORTIC  (EVAR); Surgeon: Emma Douglas MD;  [facility];  Service: Vascular    OK LAP,CHOLECYSTECTOMY N/A 6/26/2018    Procedure: Luevenia Gula;  Surgeon: Fidel Hidalgo MD;  [facility];  Service: General    OK REPAIR UMBILICAL DJLL,7+X/U,WBFOX N/A 6/26/2018    Procedure: REPAIR HERNIA UMBILICAL;  Surgeon: Fidel Hidalgo MD;  [facility];  Service: General    ROTATOR CUFF REPAIR Right      Family History   Problem Relation Age of Onset    Pancreatic cancer Mother     Diabetes Mother     Cancer Father     Diabetes Father        Social History     Social History    Marital status: Single     Spouse name: N/A    Number of children: N/A    Years of education: N/A     Social History Main Topics    Smoking status: Former Smoker     Packs/day: 0 25     Years: 15 00     Types: Cigarettes     Quit date: 1990    Smokeless tobacco: Never Used    Alcohol use Yes      Comment: Ocassional, 1 beer a month    Drug use: No    Sexual activity: Not Asked     Other Topics Concern    None     Social History Narrative    WORK:    1  [employer]    2  [employer]    3   x [id number] - denies exposure    -  Denies dust/gas/fume        HOBBIES:    -  Denies        PETS:    -  Previous dogs/cats    -  None currently    -  Denies birds        TRAVEL:    -  Previous travel to [place] - significant illness w/ cough        EXPOSURES:    -  Denies down pillows, hot tubs    -  No current mold exposure    -  Childhood exposure to pesticides/fertilizers               Meds/Allergies   No current facility-administered medications for this visit          (Not in a hospital admission)  No Known Allergies    Vitals: Blood pressure 142/90, pulse 65, temperature 97 9 °F (36 6 °C), temperature source Tympanic, resp  rate 16, height 5' 10" (1 778 m), weight 104 kg (230 lb), SpO2 95 % , RA, Body mass index is 33 kg/m²  Physical Exam  General: Pleasant, Awake alert and oriented x 3, conversant without conversational dyspnea, NAD, normal affect  HEENT:  PERRL, Sclera noninjected, nonicteric OU, Nares patent, no nasal flaring, no nasal drainage, Mucous membranes, moist, no oral lesions, normal dentition  NECK: Trachea midline, no accessory muscle use, no stridor, no cervical or supraclavicular adenopathy, JVP not elevated  CARDIAC: Reg, single s1/S2, no m/r/g  PULM: CTA B/L  CHEST: No gross deformities, equal chest expansion on inspiration bilaterally  ABD: Normoactive bowel sounds, soft nontender, nondistended, no rebound, no rigidity, no guarding  EXT: No cyanosis, no clubbing,  2+ edema, normal capillary refill  SKIN:  No rashes, no lesions  NEURO: no focal neurologic deficits, AAOx3, moving all extremities appropriately    Labs: I have personally reviewed pertinent lab results  Lab Results   Component Value Date    GLUCOSE 164 (H) 03/07/2018    CALCIUM 7 5 (L) 06/04/2018    K 3 7 06/04/2018    CO2 24 06/04/2018     06/04/2018    BUN 9 06/04/2018    CREATININE 1 08 06/04/2018     Lab Results   Component Value Date    WBC 5 68 06/26/2018    HGB 14 0 06/26/2018    HCT 41 1 06/26/2018    MCV 96 06/26/2018    PLT 96 (L) 06/26/2018     Imaging and other studies: I have personally reviewed pertinent reports  and I have personally reviewed pertinent films in PACS  CTA Chest 3/5/2018:  LUNGS:    There  is  a  5  mm  nodule  within  the  right  middle  lobe  (series  3  image  75)      Patchy  ground  glass  opacities  are  seen  within  the  left  upper  lobe  and  lingula  PLEURA:    Unremarkable      Pulmonary function testing:    No pulmonary function testing available for review  Echocardiogram: I have personally reviewed pertinent reports  9/27/2018:  LEFT VENTRICLE: Size was normal  Systolic function was normal  Ejection fraction was estimated to be 65 %  There were no regional wall motion abnormalities  There was mild concentric hypertrophy  No evidence of apical thrombus  DOPPLER:  Doppler parameters were consistent with abnormal left ventricular relaxation (grade 1 diastolic dysfunction)      VENTRICULAR SEPTUM: Significant basal septal hypertrophy      RIGHT VENTRICLE: The size was normal  Systolic function was normal  Wall thickness was normal      LEFT ATRIUM: The atrium was mildly dilated      RIGHT ATRIUM: The atrium was mildly dilated      EKG, Pathology, and Other Studies: I have personally reviewed pertinent reports  and I have personally reviewed pertinent films in PACS  6/5/2018:  Sinus bradycardia w/ PVCs    John Thomas

## 2018-12-10 NOTE — PROGRESS NOTES
Pulmonary Initial Visit  Danny Mcintyre 58 y o  male MRN: [id number]  @ Encounter: [id number]      Impressions/Recommendations:   Patient is a 59-year-old male with past medical history significant for AAA status post repair, MILLER who presents for initial pulmonary evaluation  The patient's primary concern was related to hemoptysis  Per the patient's description, he had an isolated episode approximately 6 weeks ago which has since improved over the past several weeks  He reports no evidence of hemoptysis or blood streaked sputum this week  It is unclear exactly what the underlying cause for this but there was no major concerning factors presented by the patient  I told the patient that if he has a change in character quantity or characteristic of the mop this that he should call my office immediately at which point we will obtain a high-resolution CT scan with supine and prone images given the dependent atelectasis  We will hold on this imaging until the patient has a change in his symptoms  The patient denies any significant shortness of breath  He reports he was able to walk 90 blocks recently in [place] at a normal pace without significant dyspnea  Given this stability, there is no indication for pulmonary function testing  The patient does have evidence of diastolic dysfunction as noted on his recent echocardiogram as well as slight effusion on previous CT imaging  On physical exam, the patient did have 2+ pitting lower extremity edema  He does not endorse any significant respiratory difficulties but I have encouraged him to follow up with his primary cardiologist for possible diuresis and continued monitoring of this diastolic dysfunction  The patient may follow up on as-needed basis  History of Present Illness   HPI:  Danny Mcintyre is a 58 y o  male with pmhx sig for MILLER, AAA s/p repair who presents for initial pulmonary evaluation        The patient noted difficulty with working out   He did also notice a period of hemoptysis about 6 weeks ago  Since that point, he has noted slight periods of blood tinged sputum  Generally, the hemoptysis is improving  Currently, he reports his breathing feels normal       In the spring, he did report shortness of breath at that time  Over the summer, the patient felt as if he has water on his lungs  He also noted significant swelling in his legs with edema, but that feels better now  He does notice occasional wheezing but does not use an albuterol inhaler  The patient also does report concern as 2 friends were recently diagnosed with lung cancer  Of note, the patient did report a significant respiratory illness after returning from [facility] to [place]  He reports he was placed on antibiotics for what appears to be several months but the cause or nature of the illness is unknown  He did state the condition has resolved  Review of systems:  12 point review of systems was completed and was otherwise negative except as listed in HPI  Historical Information   Past Medical History:   Diagnosis Date    [facility] [address] ([address]) [address] [address]     Cancer ([facility] [address] )     skin    H/O shoulder surgery     Hard to intubate     Grade 4 view with MAC 4, suggest video laryngoscope in future    Hypertension     Liver disease     non- alcoholic    MILLER (nonalcoholic steatohepatitis)     PONV (postoperative nausea and vomiting)      Past Surgical History:   Procedure Laterality Date    BIOPSY CORE NEEDLE N/A 6/26/2018    Procedure: BIOPSY CORE NEEDLE liver;  Surgeon: Ginger Whitman MD;  Location:  MAIN OR;  Service: General    CHOLANGIOGRAM N/A 6/26/2018    Procedure: CHOLANGIOGRAM intraop;   Surgeon: Ginger Whitman MD;  [facility];  Service: General    CHOLECYSTECTOMY      COLON SURGERY      colonoscopy    HERNIA REPAIR      KNEE ARTHROSCOPY      CA EVASC RPR DPLMNT AORTO-AORTIC NDGFT N/A 4/23/2018    Procedure: REPAIR ANEURYSM ENDOVASCULAR ABDOMINAL AORTIC  (EVAR); Surgeon: Mendel Angel, MD;  [facility];  Service: Vascular    NC LAP,CHOLECYSTECTOMY N/A 6/26/2018    Procedure: Mayda Repress;  Surgeon: Stu Jones MD;  [facility];  Service: General    NC REPAIR UMBILICAL JADM,7+K/G,PROCX N/A 6/26/2018    Procedure: REPAIR HERNIA UMBILICAL;  Surgeon: Stu Jones MD;  [facility];  Service: General    ROTATOR CUFF REPAIR Right      Family History   Problem Relation Age of Onset    Pancreatic cancer Mother     Diabetes Mother     Cancer Father     Diabetes Father        Social History     Social History    Marital status: Single     Spouse name: N/A    Number of children: N/A    Years of education: N/A     Social History Main Topics    Smoking status: Former Smoker     Packs/day: 0 25     Years: 15 00     Types: Cigarettes     Quit date: 1990    Smokeless tobacco: Never Used    Alcohol use Yes      Comment: Ocassional, 1 beer a month    Drug use: No    Sexual activity: Not Asked     Other Topics Concern    None     Social History Narrative    WORK:    1  [employer]    2  [employer]    3   x [id number] - denies exposure    -  Denies dust/gas/fume        HOBBIES:    -  Denies        PETS:    -  Previous dogs/cats    -  None currently    -  Denies birds        TRAVEL:    -  Previous travel to [place] - significant illness w/ cough        EXPOSURES:    -  Denies down pillows, hot tubs    -  No current mold exposure    -  Childhood exposure to pesticides/fertilizers               Meds/Allergies   No current facility-administered medications for this visit  (Not in a hospital admission)  No Known Allergies    Vitals: Blood pressure 142/90, pulse 65, temperature 97 9 °F (36 6 °C), temperature source Tympanic, resp  rate 16, height 5' 10" (1 778 m), weight 104 kg (230 lb), SpO2 95 % , RA, Body mass index is 33 kg/m²      Physical Exam  General: Pleasant, Awake alert and oriented x 3, conversant without conversational dyspnea, NAD, normal affect  HEENT:  PERRL, Sclera noninjected, nonicteric OU, Nares patent, no nasal flaring, no nasal drainage, Mucous membranes, moist, no oral lesions, normal dentition  NECK: Trachea midline, no accessory muscle use, no stridor, no cervical or supraclavicular adenopathy, JVP not elevated  CARDIAC: Reg, single s1/S2, no m/r/g  PULM: CTA B/L  CHEST: No gross deformities, equal chest expansion on inspiration bilaterally  ABD: Normoactive bowel sounds, soft nontender, nondistended, no rebound, no rigidity, no guarding  EXT: No cyanosis, no clubbing,  2+ edema, normal capillary refill  SKIN:  No rashes, no lesions  NEURO: no focal neurologic deficits, AAOx3, moving all extremities appropriately    Labs: I have personally reviewed pertinent lab results  Lab Results   Component Value Date    GLUCOSE 164 (H) 03/07/2018    CALCIUM 7 5 (L) 06/04/2018    K 3 7 06/04/2018    CO2 24 06/04/2018     06/04/2018    BUN 9 06/04/2018    CREATININE 1 08 06/04/2018     Lab Results   Component Value Date    WBC 5 68 06/26/2018    HGB 14 0 06/26/2018    HCT 41 1 06/26/2018    MCV 96 06/26/2018    PLT 96 (L) 06/26/2018     Imaging and other studies: I have personally reviewed pertinent reports  and I have personally reviewed pertinent films in PACS  CTA Chest 3/5/2018:  LUNGS:    There  is  a  5  mm  nodule  within  the  right  middle  lobe  (series  3  image  75)      Patchy  ground  glass  opacities  are  seen  within  the  left  upper  lobe  and  lingula  PLEURA:    Unremarkable  Pulmonary function testing:    No pulmonary function testing available for review  Echocardiogram: I have personally reviewed pertinent reports  9/27/2018:  LEFT VENTRICLE: Size was normal  Systolic function was normal  Ejection fraction was estimated to be 65 %  There were no regional wall motion abnormalities   There was mild concentric hypertrophy  No evidence of apical thrombus  DOPPLER:  Doppler parameters were consistent with abnormal left ventricular relaxation (grade 1 diastolic dysfunction)      VENTRICULAR SEPTUM: Significant basal septal hypertrophy      RIGHT VENTRICLE: The size was normal  Systolic function was normal  Wall thickness was normal      LEFT ATRIUM: The atrium was mildly dilated      RIGHT ATRIUM: The atrium was mildly dilated      EKG, Pathology, and Other Studies: I have personally reviewed pertinent reports  and I have personally reviewed pertinent films in PACS  6/5/2018:  Sinus bradycardia w/ PVCs    John Acevedo

## 2018-12-18 ENCOUNTER — TRANSCRIBE ORDERS (OUTPATIENT)
Dept: LAB | Facility: CLINIC | Age: 63
End: 2018-12-18

## 2018-12-18 ENCOUNTER — APPOINTMENT (OUTPATIENT)
Dept: LAB | Facility: CLINIC | Age: 63
End: 2018-12-18
Payer: COMMERCIAL

## 2018-12-18 ENCOUNTER — HOSPITAL ENCOUNTER (OUTPATIENT)
Dept: NON INVASIVE DIAGNOSTICS | Facility: CLINIC | Age: 63
Discharge: HOME | End: 2018-12-18
Payer: COMMERCIAL

## 2018-12-18 DIAGNOSIS — L29.8 CHOLESTATIC PRURITUS: ICD-10-CM

## 2018-12-18 DIAGNOSIS — K74.60 HEPATIC CIRRHOSIS, UNSPECIFIED HEPATIC CIRRHOSIS TYPE, UNSPECIFIED WHETHER ASCITES PRESENT (HCC): Primary | ICD-10-CM

## 2018-12-18 DIAGNOSIS — R93.3 ABNORMAL VIRTUAL COLONOSCOPE: ICD-10-CM

## 2018-12-18 DIAGNOSIS — I71.4 AAA (ABDOMINAL AORTIC ANEURYSM) WITHOUT RUPTURE (HCC): Chronic | ICD-10-CM

## 2018-12-18 DIAGNOSIS — K74.60 HEPATIC CIRRHOSIS, UNSPECIFIED HEPATIC CIRRHOSIS TYPE, UNSPECIFIED WHETHER ASCITES PRESENT (HCC): ICD-10-CM

## 2018-12-18 LAB
AFP-TM SERPL-MCNC: 6 NG/ML (ref 0.5–8)
ALBUMIN SERPL BCP-MCNC: 3 G/DL (ref 3.5–5)
ALP SERPL-CCNC: 103 U/L (ref 46–116)
ALT SERPL W P-5'-P-CCNC: 36 U/L (ref 12–78)
ANION GAP SERPL CALCULATED.3IONS-SCNC: 9 MMOL/L (ref 4–13)
AST SERPL W P-5'-P-CCNC: 67 U/L (ref 5–45)
BILIRUB SERPL-MCNC: 2.2 MG/DL (ref 0.2–1)
BUN SERPL-MCNC: 13 MG/DL (ref 5–25)
CALCIUM SERPL-MCNC: 8.5 MG/DL (ref 8.3–10.1)
CHLORIDE SERPL-SCNC: 105 MMOL/L (ref 100–108)
CO2 SERPL-SCNC: 25 MMOL/L (ref 21–32)
CREAT SERPL-MCNC: 0.98 MG/DL (ref 0.6–1.3)
GFR SERPL CREATININE-BSD FRML MDRD: 81 ML/MIN/1.73SQ M
GLUCOSE P FAST SERPL-MCNC: 95 MG/DL (ref 65–99)
INR PPP: 1.35 (ref 0.86–1.17)
POTASSIUM SERPL-SCNC: 3.9 MMOL/L (ref 3.5–5.3)
PROT SERPL-MCNC: 7.6 G/DL (ref 6.4–8.2)
PROTHROMBIN TIME: 16.3 SECONDS (ref 11.8–14.2)
SODIUM SERPL-SCNC: 139 MMOL/L (ref 136–145)

## 2018-12-18 PROCEDURE — 36415 COLL VENOUS BLD VENIPUNCTURE: CPT

## 2018-12-18 PROCEDURE — 93922 UPR/L XTREMITY ART 2 LEVELS: CPT | Performed by: SURGERY

## 2018-12-18 PROCEDURE — 85610 PROTHROMBIN TIME: CPT

## 2018-12-18 PROCEDURE — 93979 VASCULAR STUDY: CPT | Performed by: SURGERY

## 2018-12-18 PROCEDURE — 93978 VASCULAR STUDY: CPT

## 2018-12-18 PROCEDURE — 82105 ALPHA-FETOPROTEIN SERUM: CPT

## 2018-12-18 PROCEDURE — 80053 COMPREHEN METABOLIC PANEL: CPT

## 2018-12-21 ENCOUNTER — TRANSCRIBE ORDERS (OUTPATIENT)
Dept: ADMINISTRATIVE | Facility: HOSPITAL | Age: 63
End: 2018-12-21

## 2018-12-21 DIAGNOSIS — K74.60 HEPATIC CIRRHOSIS, UNSPECIFIED HEPATIC CIRRHOSIS TYPE, UNSPECIFIED WHETHER ASCITES PRESENT (HCC): Primary | ICD-10-CM

## 2018-12-26 ENCOUNTER — HOSPITAL ENCOUNTER (OUTPATIENT)
Dept: ULTRASOUND IMAGING | Facility: CLINIC | Age: 63
Discharge: HOME | End: 2018-12-26
Payer: COMMERCIAL

## 2018-12-26 DIAGNOSIS — K74.60 HEPATIC CIRRHOSIS, UNSPECIFIED HEPATIC CIRRHOSIS TYPE, UNSPECIFIED WHETHER ASCITES PRESENT (HCC): ICD-10-CM

## 2018-12-26 PROCEDURE — 76705 ECHO EXAM OF ABDOMEN: CPT

## 2019-01-16 LAB — HBA1C MFR BLD HPLC: 5.6 %

## 2019-01-28 RX ORDER — LISINOPRIL 20 MG/1
20 TABLET ORAL DAILY
Refills: 3 | COMMUNITY
Start: 2018-12-13 | End: 2019-01-29

## 2019-01-28 RX ORDER — HYDROCHLOROTHIAZIDE 25 MG/1
25 TABLET ORAL DAILY
Refills: 3 | COMMUNITY
Start: 2018-12-13 | End: 2019-01-29

## 2019-01-29 ENCOUNTER — OFFICE VISIT (OUTPATIENT)
Dept: CARDIOLOGY CLINIC | Facility: CLINIC | Age: 64
End: 2019-01-29
Payer: COMMERCIAL

## 2019-01-29 VITALS
SYSTOLIC BLOOD PRESSURE: 144 MMHG | HEART RATE: 66 BPM | HEIGHT: 70 IN | WEIGHT: 243.1 LBS | DIASTOLIC BLOOD PRESSURE: 88 MMHG | BODY MASS INDEX: 34.8 KG/M2

## 2019-01-29 DIAGNOSIS — I71.4 AAA (ABDOMINAL AORTIC ANEURYSM) WITHOUT RUPTURE (HCC): Primary | ICD-10-CM

## 2019-01-29 DIAGNOSIS — I10 HYPERTENSION, UNSPECIFIED TYPE: ICD-10-CM

## 2019-01-29 PROCEDURE — 99245 OFF/OP CONSLTJ NEW/EST HI 55: CPT | Performed by: INTERNAL MEDICINE

## 2019-01-29 RX ORDER — NADOLOL 20 MG/1
40 TABLET ORAL DAILY
Qty: 30 TABLET | Refills: 3 | Status: SHIPPED | OUTPATIENT
Start: 2019-01-29 | End: 2019-02-28 | Stop reason: ALTCHOICE

## 2019-01-29 RX ORDER — VERAPAMIL HYDROCHLORIDE 40 MG/1
40 TABLET ORAL 3 TIMES DAILY
Qty: 90 TABLET | Refills: 3 | Status: SHIPPED | OUTPATIENT
Start: 2019-01-29 | End: 2019-02-28 | Stop reason: ALTCHOICE

## 2019-01-30 PROCEDURE — 93000 ELECTROCARDIOGRAM COMPLETE: CPT | Performed by: INTERNAL MEDICINE

## 2019-01-30 NOTE — PROGRESS NOTES
HCM Consultation - Cardiology   Elizabeth Graves 58 y o  male MRN: [id number]  Unit/Bed#:  Encounter: [id number]      Assessment & plan: Mr Katie Amado has HOCM phenotype on echocardiogram  He also has long standing hypertension treated with a vasodilator and a diuretic  His personal and family history is benign in reference to risk of sudden cardiac death  I spoke in details to him regarding the possibilities and course of action  To begin with I have switched him to a combination of nadolol and verapamil to exclude the contribution of current antihypertensive medications to his dynamic left ventricular outflow tract obstruction  He will check his blood pressure at home twice daily and inform me if the values are high or low or if he develops any new symptoms  I will see him in one month to reevaluate the effects of the new medical regimen and decide regarding further work up for risk stratification, family screening and possible genetic testing  Physician Requesting Consult: Juan Condon DO  Reason for Consult / Principal Problem: Hypertrophic Cardiomyopathy    HPI: Elizabeth Graves is a 58y o  year old male who is referred to the [place] [id number] for evaluation after an echocardiogram performed on 9- showed asymmetric septal hypertrophy, systolic anterior motion of anterior mitral leaflet and dynamic outflow tract obstruction with a peak resting gradient of 64 mmHg  The study also showed mild biatrial dilation and mild to moderate mitral regurgitation  He has history of longstanding hypertension (since 1990s), large abdominal aortic aneurysm that has been repaired percutaneously, non-alcoholic liver cirrhosis with esophageal varices, and a 5 mm lung nodule seen on computed tomography  He is treated for his hypertension with a combination of lisinopril and hydrochlorothiazide   In October 2017 he underwent an exercise test where he exercised for 6 minutes and 36 seconds (7 METs) to reach 90% of predicted heart rate for age without obvious electrocardiographic evidence for myocardial ischemia  He has worked for the [employer] and after a short period of assisted is getting back to work again  Family History  Father  in his 62s (46) from ischemic heart disease  Mother  at age 78 and had no known heart disease  A half brother (father's side)  at age 76 and had carotid artery disease  An older sister (75) has no heart problem  He has no children  There is no history of cardiomyopathy or sudden unexplained or cardiac death in the family  Consults    Review of Systems:  Denies chest pain, shortness of breath, or syncope  Has noted occasional brief episode of dizziness and palpitation  No headache or visual changes  All other organ systems negative for symptoms  Historical Information   Past Medical History:   Diagnosis Date    Abdominal [facility] aneurysm (AAA) ([facility] )     Cancer ([facility] [address] )     skin    H/O shoulder surgery     Hard to intubate     Grade 4 view with MAC 4, suggest video laryngoscope in future    Hypertension     Liver disease     non- alcoholic    MILLER (nonalcoholic steatohepatitis)     PONV (postoperative nausea and vomiting)      Past Surgical History:   Procedure Laterality Date    BIOPSY CORE NEEDLE N/A 2018    Procedure: BIOPSY CORE NEEDLE liver;  Surgeon: Christiano Mayen MD;  [facility];  Service: General    CHOLANGIOGRAM N/A 2018    Procedure: CHOLANGIOGRAM intraop; Surgeon: Christiano Mayen MD;  [facility];  Service: General    CHOLECYSTECTOMY      COLON SURGERY      colonoscopy    HERNIA REPAIR      KNEE ARTHROSCOPY      AR EVASC RPR DPLMNT AORTO-AORTIC NDGFT N/A 2018    Procedure: REPAIR ANEURYSM ENDOVASCULAR ABDOMINAL AORTIC  (EVAR);   Surgeon: Mildred Chatterjee MD;  [facility];  Service: Vascular    AR LAP,CHOLECYSTECTOMY N/A 2018    Procedure: Lazaro Laughter;  Surgeon: Christiano Mayen MD; Location:  MAIN OR;  Service: General    LA REPAIR UMBILICAL NWIA,4+Y/C,TGHQA N/A 6/26/2018    Procedure: REPAIR HERNIA UMBILICAL;  Surgeon: Narciso Villagran MD;  Location:  MAIN OR;  Service: General    ROTATOR CUFF REPAIR Right      Family History   Problem Relation Age of Onset    Pancreatic cancer Mother     Diabetes Mother     Cancer Father     Diabetes Father      Current Outpatient Prescriptions on File Prior to Visit   Medication Sig Dispense Refill    aspirin (ECOTRIN LOW STRENGTH) 81 mg EC tablet Take 81 mg by mouth daily      citalopram (CeleXA) 40 mg tablet every 24 hours      LORazepam (ATIVAN) 0 5 mg tablet 1 tablet      Multiple Vitamin (MULTIVITAMIN) capsule Take 1 capsule by mouth daily      Omega-3 Fatty Acids (FISH OIL) 1,000 mg Take 1,000 mg by mouth daily      sildenafil (REVATIO) 20 mg tablet every 24 hours      zolpidem (AMBIEN) 10 mg tablet Take 10 mg by mouth daily at bedtime as needed        albuterol (PROAIR HFA) 90 mcg/act inhaler 6 (six) times a day      atorvastatin (LIPITOR) 20 mg tablet Take 1 tablet (20 mg total) by mouth daily at bedtime (Patient not taking: Reported on 12/10/2018 ) 30 tablet 2    CLENPIQ 10-3 5-12 MG-GM -GM/160ML SOLN TAKE 2 doses BY MOUTH AS DIRECTED  0     No current facility-administered medications on file prior to visit  No Known Allergies  History   Alcohol Use    Yes     Comment: Ocassional, 1 beer a month     History   Drug Use No     History   Smoking Status    Former Smoker    Packs/day: 0 25    Years: 15 00    Types: Cigarettes    Quit date: 1990   Smokeless Tobacco    Never Used     Objective   Vitals: Blood pressure 144/88, pulse 66, height 5' 10" (1 778 m), weight 110 kg (243 lb 1 6 oz)  , Body mass index is 34 88 kg/m² ,     Invasive Devices          No matching active lines, drains, or airways        Physical Exam:  GEN: Destiny Levin appears well, alert and oriented x 3, pleasant and cooperative   HEENT: pupils equal, round, and reactive to light; extraocular muscles intact  NECK: supple, no carotid bruits   HEART: regular rhythm with frequent extrasystole, normal S1 and S2, 2/6 systolic murmur at the base with accentuation following extrasystoles, 2/6 holosystolic murmur at the apex, +S4 gallop, clicks or rubs   LUNGS: clear to auscultation bilaterally; no wheezes, rales, or rhonchi   ABDOMEN: normal bowel sounds, soft, no tenderness, no distention  EXTREMITIES: peripheral pulses normal; no clubbing, cyanosis, or edema  NEURO: no focal findings   SKIN: normal without suspicious lesions on exposed skin    Lab Results:   Labs:  Lab Results   Component Value Date    WBC 5 68 06/26/2018    RBC 4 28 06/26/2018    HGB 14 0 06/26/2018    HCT 41 1 06/26/2018    MCV 96 06/26/2018    PLT 96 (L) 06/26/2018    RDW 14 0 06/26/2018     Lab Results   Component Value Date    K 3 9 12/18/2018     12/18/2018    CO2 25 12/18/2018    BUN 13 12/18/2018    CREATININE 0 98 12/18/2018    EGFR 81 12/18/2018    GLUCOSE 164 (H) 03/07/2018    CALCIUM 8 5 12/18/2018    AST 67 (H) 12/18/2018    ALT 36 12/18/2018    ALKPHOS 103 12/18/2018     No results found for: MG  Lab Results   Component Value Date    HDL 47 09/24/2018    HDL 47 09/24/2018    TRIG 100 09/24/2018    LDLCALC 98 09/24/2018    LDLCALC 106 (H) 09/24/2018     No results found for: [id number], [address], [address], [id number], [id number]    Imaging:   I have personally reviewed pertinent films in PACS  No results found  EKG: Sinus rhythm at 72 bpm, minor non-specific ST-T abnormalities    Cardiac testing:   Results for orders placed during the hospital encounter of 09/27/18   Echo complete with contrast if indicated    Narrative [address] [place] [address] 35    [place], [address]  [phone]    Transthoracic Echocardiogram  2D, M-mode, Doppler, and Color Doppler    Study date:  27-Sep-2018    Patient: Rome Rodriguez  MR number: [id number]  Account number: [id number]  : 1955  Age: 58 years  Gender: Male  Status: Outpatient  Location: [address] [place] [address] [facility] [place]  Height: 70 in  Weight: 235 lb  BP: 130/ 76 mmHg    Indications: Murmur  Diagnoses: R01 1 - Cardiac murmur, unspecified    Sonographer:  MEY Ayala  Primary Physician:  Michael Yuan  Referring Physician:  Dalton Copeland MD  Group:  [facility]  Interpreting Physician:  Dalton Copelnad MD    SUMMARY    LEFT VENTRICLE:  Systolic function was normal  Ejection fraction was estimated to be 65 %  There were no regional wall motion abnormalities  There was mild concentric hypertrophy  Doppler parameters were consistent with abnormal left ventricular relaxation (grade 1 diastolic dysfunction)  VENTRICULAR SEPTUM:  Significant basal septal hypertrophy  LEFT ATRIUM:  The atrium was mildly dilated  RIGHT ATRIUM:  The atrium was mildly dilated  MITRAL VALVE:  There was systolic anterior motion  There is a left ventricular outflow tract gradient of approximately 64 mmHg  There is eccentric mitral regurgitation that is difficult to quantify but is at least mild to moderate  AORTA:  The root exhibited mild dilatation  HISTORY: PRIOR HISTORY: Risk factors: hypertension  PROCEDURE: The study was performed in the [address] [place] [address]  This was a routine study  The transthoracic approach was used  The study included complete 2D imaging, M-mode, complete spectral Doppler, and color Doppler  The  heart rate was 60 bpm, at the start of the study  Image quality was adequate  LEFT VENTRICLE: Size was normal  Systolic function was normal  Ejection fraction was estimated to be 65 %  There were no regional wall motion abnormalities  There was mild concentric hypertrophy  No evidence of apical thrombus  DOPPLER:  Doppler parameters were consistent with abnormal left ventricular relaxation (grade 1 diastolic dysfunction)      VENTRICULAR SEPTUM: Significant basal septal hypertrophy  RIGHT VENTRICLE: The size was normal  Systolic function was normal  Wall thickness was normal     LEFT ATRIUM: The atrium was mildly dilated  RIGHT ATRIUM: The atrium was mildly dilated  MITRAL VALVE: Valve structure was normal  There was normal leaflet separation  There was systolic anterior motion  There is a left ventricular outflow tract gradient of approximately 64 mmHg  DOPPLER: The transmitral velocity was within  the normal range  There was no evidence for stenosis  There is eccentric mitral regurgitation that is difficult to quantify but is at least mild to moderate  AORTIC VALVE: The valve was trileaflet  Leaflets exhibited normal thickness, mild calcification, and normal cuspal separation  DOPPLER: Transaortic velocity was within the normal range  There was no evidence for stenosis  There was no  significant regurgitation  TRICUSPID VALVE: The valve structure was normal  There was normal leaflet separation  DOPPLER: The transtricuspid velocity was within the normal range  There was no evidence for stenosis  There was no significant regurgitation  PULMONIC VALVE: Leaflets exhibited normal thickness, no calcification, and normal cuspal separation  DOPPLER: The transpulmonic velocity was within the normal range  There was no significant regurgitation  PERICARDIUM: There was no pericardial effusion  The pericardium was normal in appearance  AORTA: The root exhibited mild dilatation  SYSTEMIC VEINS: IVC: The inferior vena cava was normal in size      SYSTEM MEASUREMENT TABLES    2D  %FS: 42 7 %  Ao Diam: 4 1 cm  EDV(Teich): 94 8 ml  EF(Teich): 73 9 %  ESV(Teich): 24 8 ml  IVSd: 1 2 cm  LA Area: 23 4 cm2  LA Diam: 4 5 cm  LVEDV MOD A4C: 134 3 ml  LVEF MOD A4C: 63 8 %  LVESV MOD A4C: 48 6 ml  LVIDd: 4 5 cm  LVIDs: 2 6 cm  LVLd A4C: 10 cm  LVLs A4C: 7 8 cm  LVPWd: 1 2 cm  RA Area: 22 4 cm2  RVIDd: 3 6 cm  SV MOD A4C: 85 6 ml  SV(Teich): 70 ml    MM  TAPSE: 2 6 cm    PW  E': 0 1 m/s  E/E': 11 8  MV A Jorge: 0 8 m/s  MV Dec [place]: 2 3 m/s2  MV DecT: 315 1 ms  MV E Jorge: 0 7 m/s  MV E/A Ratio: 0 9  MV PHT: 91 4 ms  MVA By PHT: 2 4 cm2    [address] Accredited Echocardiography Laboratory    Prepared and electronically signed by    Won Mcallister MD  Signed 27-Sep-2018 14:24:59       Counseling / Coordination of Care  Total floor / unit time spent today 60 minutes  Greater than 50% of total time was spent with the patient and / or family counseling and / or coordination of care

## 2019-02-21 ENCOUNTER — OFFICE VISIT (OUTPATIENT)
Dept: CARDIOLOGY CLINIC | Facility: CLINIC | Age: 64
End: 2019-02-21
Payer: COMMERCIAL

## 2019-02-21 VITALS
BODY MASS INDEX: 36.39 KG/M2 | DIASTOLIC BLOOD PRESSURE: 86 MMHG | HEART RATE: 56 BPM | RESPIRATION RATE: 16 BRPM | HEIGHT: 70 IN | WEIGHT: 254.2 LBS | SYSTOLIC BLOOD PRESSURE: 136 MMHG

## 2019-02-21 DIAGNOSIS — I70.90 ATHEROSCLEROSIS: Primary | ICD-10-CM

## 2019-02-21 PROCEDURE — 99214 OFFICE O/P EST MOD 30 MIN: CPT | Performed by: INTERNAL MEDICINE

## 2019-02-21 RX ORDER — PRAVASTATIN SODIUM 20 MG
20 TABLET ORAL
Qty: 30 TABLET | Refills: 5 | Status: SHIPPED | OUTPATIENT
Start: 2019-02-21 | End: 2019-10-23

## 2019-02-21 NOTE — PROGRESS NOTES
[address] Card[facility] [place]  [address]  [facility], [address] [facility]  [phone]    Cardiology Follow up    Patient:  Anika Mahan  :  1955  MRN:  [id number]    History of Present Illness:     50-year-old man with past medical history of hypertrophic cardiomyopathy with obstruction, hypertension, abdominal aortic aneurysm status post percutaneous repair, mild aortic root dilatation, and cirrhosis with gastroesophageal varices noted on CT scan-etiology of cirrhosis appears to be MILLER presents for cardiology follow-up  He denies any chest pain, shortness of breath, palpitations, dizziness, or syncope  He has felt very fatigued since this started on the beta-blocker and calcium channel blocker for his hypertrophic cardiomyopathy with obstruction  He will start a job working for the [employer]  He is glad to be out of the [employer]  Patient Active Problem List   Diagnosis    AAA (abdominal aortic aneurysm) without rupture (HCC)    Hyperbilirubinemia    Transaminitis    Essential hypertension    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 ([address] )    Cholelithiasis    Diastolic dysfunction    Hemoptysis       Past Surgical History  Past Surgical History:   Procedure Laterality Date    BIOPSY CORE NEEDLE N/A 2018    Procedure: BIOPSY CORE NEEDLE liver;  Surgeon: Gigner Whitman MD;  [facility];  Service: General    CHOLANGIOGRAM N/A 2018    Procedure: CHOLANGIOGRAM intraop; Surgeon: Ginger Whitman MD;  [facility];  Service: General    CHOLECYSTECTOMY      COLON SURGERY      colonoscopy    HERNIA REPAIR      KNEE ARTHROSCOPY      AK EVASC RPR DPLMNT AORTO-AORTIC NDGFT N/A 2018    Procedure: REPAIR ANEURYSM ENDOVASCULAR ABDOMINAL AORTIC  (EVAR);   Surgeon: Fernando Lane MD;  [facility];  Service: Vascular    AK LAP,CHOLECYSTECTOMY N/A 2018    Procedure: [place];  Surgeon: Ginger Whitman MD; Location:  MAIN OR;  Service: General    MN REPAIR UMBILICAL EYOA,3+G/G,PVLVQ N/A 2018    Procedure: REPAIR HERNIA UMBILICAL;  Surgeon: Tanesha Hwang MD;  Location:  MAIN OR;  Service: General    ROTATOR CUFF REPAIR Right        Social History   Social History     Socioeconomic History    Marital status: Single     Spouse name: Not on file    Number of children: Not on file    Years of education: Not on file    Highest education level: Not on file   Occupational History    Not on file   Social Needs    Financial resource strain: Not on file    Food insecurity:     Worry: Not on file     Inability: Not on file    Transportation needs:     Medical: Not on file     Non-medical: Not on file   Tobacco Use    Smoking status: Former Smoker     Packs/day: 0 25     Years: 15 00     Pack years: 3 75     Types: Cigarettes     Last attempt to quit:      Years since quittin 1    Smokeless tobacco: Never Used   Substance and Sexual Activity    Alcohol use: Yes     Comment: Ocassional, 1 beer a month    Drug use: No    Sexual activity: Not on file   Lifestyle    Physical activity:     Days per week: Not on file     Minutes per session: Not on file    Stress: Not on file   Relationships    Social connections:     Talks on phone: Not on file     Gets together: Not on file     Attends [employer] service: Not on file     Active member of club or organization: Not on file     Attends meetings of clubs or organizations: Not on file     Relationship status: Not on file    Intimate partner violence:     Fear of current or ex partner: Not on file     Emotionally abused: Not on file     Physically abused: Not on file     Forced sexual activity: Not on file   Other Topics Concern    Not on file   Social History Narrative    WORK:    1  Government    2  Technology    3     x [id number] - denies exposure    -  Denies dust/gas/fume        HOBBIES:    -  Denies        PETS:    -  Previous dogs/cats    -  None currently    -  Denies birds        TRAVEL:    -  Previous travel to [place] - significant illness w/ cough        EXPOSURES:    -  Denies down pillows, hot tubs    -  No current mold exposure    -  Childhood exposure to pesticides/fertilizers            No Known Allergies    Family History   Family History   Problem Relation Age of Onset    Pancreatic cancer Mother     Diabetes Mother     Cancer Father     Diabetes Father        Review of Systems:  Review of Systems   Constitutional: Positive for fatigue  Negative for chills and fever  HENT: Negative for hearing loss and trouble swallowing  Eyes: Negative for pain  Respiratory: Negative for cough, chest tightness and shortness of breath  Cardiovascular: Negative for chest pain, palpitations and leg swelling  Gastrointestinal: Negative for abdominal pain, blood in stool, nausea and vomiting  Endocrine: Negative for cold intolerance and heat intolerance  Genitourinary: Negative for difficulty urinating, frequency and hematuria  Musculoskeletal: Negative for arthralgias and neck pain  Skin: Negative for rash  Allergic/Immunologic: Negative for environmental allergies  Neurological: Negative for dizziness, weakness and headaches  Hematological: Does not bruise/bleed easily  Psychiatric/Behavioral: Negative for decreased concentration and sleep disturbance  The patient is not nervous/anxious            Current Outpatient Medications:     albuterol (PROAIR HFA) 90 mcg/act inhaler, 6 (six) times a day, Disp: , Rfl:     aspirin (ECOTRIN LOW STRENGTH) 81 mg EC tablet, Take 81 mg by mouth daily, Disp: , Rfl:     citalopram (CeleXA) 40 mg tablet, every 24 hours, Disp: , Rfl:     LORazepam (ATIVAN) 0 5 mg tablet, 1 tablet, Disp: , Rfl:     Multiple Vitamin (MULTIVITAMIN) capsule, Take 1 capsule by mouth daily, Disp: , Rfl:     nadolol (CORGARD) 20 mg tablet, Take 2 tablets (40 mg total) by mouth daily, Disp: 30 tablet, Rfl: 3    Omega-3 Fatty Acids (FISH OIL) 1,000 mg, Take 1,000 mg by mouth daily, Disp: , Rfl:     sildenafil (REVATIO) 20 mg tablet, every 24 hours, Disp: , Rfl:     verapamil (CALAN) 40 mg tablet, Take 1 tablet (40 mg total) by mouth 3 (three) times a day, Disp: 90 tablet, Rfl: 3    zolpidem (AMBIEN) 10 mg tablet, Take 10 mg by mouth daily at bedtime as needed  , Disp: , Rfl:     atorvastatin (LIPITOR) 20 mg tablet, Take 1 tablet (20 mg total) by mouth daily at bedtime (Patient not taking: Reported on 12/10/2018 ), Disp: 30 tablet, Rfl: 2    CLENPIQ 10-3 5-12 MG-GM -GM/160ML SOLN, TAKE 2 doses BY MOUTH AS DIRECTED, Disp: , Rfl: 0     Physical Exam:    Vitals:    02/21/19 1510   BP: 136/86   Pulse: 56   Resp: 16   Weight: 115 kg (254 lb 3 2 oz)   Height: 5' 10" (1 778 m)       Physical Exam   Constitutional: He is oriented to person, place, and time  He appears well-developed and well-nourished  HENT:   Head: Normocephalic  Right Ear: External ear normal    Left Ear: External ear normal    Mouth/Throat: Oropharynx is clear and moist    Eyes: Pupils are equal, round, and reactive to light  Neck: No JVD present  Carotid bruit is not present  Cardiovascular: Normal rate, regular rhythm and intact distal pulses  Exam reveals no gallop and no friction rub  No murmur heard  Pulmonary/Chest: Effort normal and breath sounds normal  No tachypnea  No respiratory distress  He has no wheezes  He has no rales  He exhibits no tenderness  Abdominal: Soft  He exhibits no distension  There is no tenderness  There is no rebound and no guarding  Musculoskeletal: He exhibits no edema  Neurological: He is alert and oriented to person, place, and time  Skin: Skin is warm and dry  Psychiatric: He has a normal mood and affect  His behavior is normal  Judgment and thought content normal    Nursing note and vitals reviewed        Labs:  Orders Only on 01/16/2019   Component Date Value    Hemoglobin A1C 01/16/2019 5 6        Assessment/Plan:    1  Hypertension  He will follow up with Dr Bassam Cisse who has changed his medicines for hypertrophic cardiomyopathy    2  Evidence of atherosclerosis with abdominal aortic aneurysm  He continues on aspirin  I would like to start low-dose statin for him  Will follow up with blood test afterwards  3  Hypertrophic cardiomyopathy  He follows up with Dr Mily Ybarra  4  Mild aortic root dilatation  This is noted  Will see him back in 3 months for follow-up  Thank you so much, please do not hesitate to contact me with any questions or concerns  Thank you so much, please do not hesitate to contact me with any questions or concerns      Dalia Coffey MD  2/21/2019  3:20 PM

## 2019-02-25 ENCOUNTER — TELEPHONE (OUTPATIENT)
Dept: CARDIOLOGY CLINIC | Facility: CLINIC | Age: 64
End: 2019-02-25

## 2019-02-25 DIAGNOSIS — K74.60 LIVER CIRRHOSIS SECONDARY TO NASH (HCC): Primary | ICD-10-CM

## 2019-02-25 DIAGNOSIS — K75.81 LIVER CIRRHOSIS SECONDARY TO NASH (HCC): Primary | ICD-10-CM

## 2019-02-25 NOTE — TELEPHONE ENCOUNTER
Patient called explained he had a call from [address]  [facility] he was going to  changed his B-P medication last week  Patient is not sure what the medication is he said it was the last two medication they are nadolol and verapamil   Dahiana Nolasco told him he was changing one the doses       Please get in touch with him [employer]

## 2019-02-28 DIAGNOSIS — I42.2 HYPERTROPHIC CARDIOMYOPATHY (HCC): Primary | ICD-10-CM

## 2019-02-28 RX ORDER — BISOPROLOL FUMARATE 5 MG/1
5 TABLET ORAL DAILY
Qty: 90 TABLET | Refills: 3 | Status: SHIPPED | OUTPATIENT
Start: 2019-02-28 | End: 2019-10-23 | Stop reason: ALTCHOICE

## 2019-03-05 ENCOUNTER — OFFICE VISIT (OUTPATIENT)
Dept: CARDIOLOGY CLINIC | Facility: CLINIC | Age: 64
End: 2019-03-05
Payer: COMMERCIAL

## 2019-03-05 VITALS
BODY MASS INDEX: 36.36 KG/M2 | HEIGHT: 70 IN | HEART RATE: 52 BPM | DIASTOLIC BLOOD PRESSURE: 72 MMHG | SYSTOLIC BLOOD PRESSURE: 130 MMHG | WEIGHT: 254 LBS

## 2019-03-05 DIAGNOSIS — I42.2 HYPERTROPHIC CARDIOMYOPATHY (HCC): Primary | ICD-10-CM

## 2019-03-05 PROCEDURE — 99214 OFFICE O/P EST MOD 30 MIN: CPT | Performed by: INTERNAL MEDICINE

## 2019-03-05 NOTE — PROGRESS NOTES
[place] [address] Follow Up Visit - Cardiology   Stanton Barajas 58 y o  male MRN: [id number]  Unit/Bed#:  Encounter: [id number]      Plan: Mr Tammy Jason did not tolerate nadolol well and complained of excessive fatigue, lack of energy and sleepiness  His reported home blood pressure readings were, however, acceptable  The medical regimen was changed to bisoprolol and long-acting verapamil that he has tolerated very well  He has not yet started an organized program of daily exercise especially with his new job but intends to do that soon  His diet is also not ideal and he has recently gained weight  Both of these issues were discussed in detail  His risk stratification has not been completed and he will be scheduled to get a cardiac MRI, a stress echo study and a 48 hour ambulatory monitor prior to his next appointment with Dr Hanson Common  He denies shortness of breath, dizziness, syncope or palpitation  He has been experiencing left sided parasternal chest discomfort lately that appears to be reproducible  He has been following up appropriately with his specialists for the abdominal aneurysm, pulmonary nodule and liver cirrhosis  Physician Requesting Consult: Dr Lorene Boston  Reason for Consult / Principal Problem: HOCM    HPI: Stanton Barajas is a 58y o  year old man who was referred to the [place] [address] for evaluation after an echocardiogram performed on 9- showed asymmetric septal hypertrophy, systolic anterior motion of anterior mitral leaflet and dynamic outflow tract obstruction with a peak resting gradient of 64 mmHg  The study also showed mild biatrial dilation and mild to moderate mitral regurgitation  He has history of longstanding hypertension (since 1990s), large abdominal aortic aneurysm that has been repaired percutaneously, non-alcoholic liver cirrhosis with esophageal varices, and a 5 mm lung nodule seen on computed tomography   He was treated for his hypertension with a combination of lisinopril and hydrochlorothiazide and complained of periods of symptomatic hypotension  In 2017 he underwent an exercise test where he exercised for 6 minutes and 36 seconds (7 METs) to reach 90% of predicted heart rate for age without obvious electrocardiographic evidence for myocardial ischemia  He has worked for the [employer] and 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 is getting back to work again, this time for [employer]  He drives 50 minutes each way to his office and is mostly sedentary  On the first visit to the [facility] clinic on 2019, Mr Katie Amado was taken off the vasodilator and a diuretic and was started nadolol and verapamil to exclude the contribution of current antihypertensive medications to his dynamic left ventricular outflow tract obstruction  He was asked to check his blood pressure at home twice daily and inform me if the values are high or low or if he develops any new symptoms       Family History  Father  in his 62s (46) from ischemic heart disease  Mother  at age 78 and had no known heart disease  A half brother (father's side)  at age 76 and had carotid artery disease  An older sister (75) has no heart problem  He has no children  There is no history of cardiomyopathy or sudden unexplained or cardiac death in the family  Review of Systems: Denies shortness of breath, dizziness, palpitation or syncope  Has noted occasional brief episode of left sided parasternal chest discomfort that is reproducible  No headache or visual changes  All other organ systems negative for symptoms      Historical Information   Past Medical History:   Diagnosis Date    Ab[facility] (AAA) ([facility] [address] [facility]     Cancer ([facility] [address] )     skin    H/O shoulder surgery     Hard to intubate     Grade 4 view with MAC 4, suggest video laryngoscope in future    Hypertension     Liver disease     non- alcoholic    MILLER (nonalcoholic steatohepatitis)     PONV (postoperative nausea and vomiting) Past Surgical History:   Procedure Laterality Date    BIOPSY CORE NEEDLE N/A 2018    Procedure: BIOPSY CORE NEEDLE liver;  Surgeon: Freddy Vega MD;  [facility];  Service: General    CHOLANGIOGRAM N/A 2018    Procedure: CHOLANGIOGRAM intraop; Surgeon: Freddy Vega MD;  [facility];  Service: General    CHOLECYSTECTOMY      COLON SURGERY      colonoscopy    HERNIA REPAIR      KNEE ARTHROSCOPY      [place] EVASC RPR DPLMNT AORTO-AORTIC NDGFT N/A 2018    Procedure: REPAIR ANEURYSM ENDOVASCULAR ABDOMINAL AORTIC  (EVAR); Surgeon: Haleigh Sanchez MD;  [facility];  Service: Vascular    [place] LAP,CHOLECYSTECTOMY N/A 2018    Procedure: Vonda Downs;  Surgeon: Freddy Vega MD;  [facility];  Service: General    [place] REPAIR UMBILICAL GXGK,5+B/B,UBMCO N/A 2018    Procedure: REPAIR HERNIA UMBILICAL;  Surgeon: Freddy Vega MD;  [facility];  Service: General    ROTATOR CUFF REPAIR Right      Social History     Substance and Sexual Activity   Alcohol Use Yes    Comment: Ocassional, 1 beer a month     Social History     Substance and Sexual Activity   Drug Use No     Social History     Tobacco Use   Smoking Status Former Smoker    Packs/day: 0 25    Years: 15 00    Pack years: 3 75    Types: Cigarettes    Last attempt to quit: Ragini Swain Years since quittin 1   Smokeless Tobacco Never Used     Meds/Allergies   all current active meds have been reviewed  No Known Allergies    Objective   Vitals: Blood pressure 130/72, pulse (!) 52, height 5' 10" (1 778 m), weight 115 kg (254 lb)  , Body mass index is 36 45 kg/m² ,     Invasive Devices          None        Physical Exam:  GEN: Elizabeth Graves appears well, alert and oriented x 3, pleasant and cooperative   HEENT: pupils equal, round, and reactive to light; extraocular muscles intact  NECK: supple, no carotid bruits   HEART: regular rhythm, normal S1 and S2, 2/6 systolic murmur at the base, 2/6 holosystolic murmur at the apex, +S4 gallop, no clicks or rubs   LUNGS: clear to auscultation bilaterally; no wheezes, rales, or rhonchi   ABDOMEN: normal bowel sounds, soft, no tenderness, no distention  EXTREMITIES: peripheral pulses normal; no clubbing, cyanosis, or edema  NEURO: no focal findings   SKIN: normal without suspicious lesions on exposed skin    Lab Results:   No visits with results within 1 Day(s) from this visit  Latest known visit with results is:   Orders Only on 01/16/2019   Component Date Value    Hemoglobin A1C 01/16/2019 5 6        Imaging: I have personally reviewed pertinent reports  Counseling / Coordination of Care  Total floor / unit time spent today 40 minutes  Greater than 50% of total time was spent with the patient and / or family counseling and / or coordination of care

## 2019-03-21 ENCOUNTER — HOSPITAL ENCOUNTER (OUTPATIENT)
Dept: NON INVASIVE DIAGNOSTICS | Facility: HOSPITAL | Age: 64
Discharge: HOME | End: 2019-03-21
Payer: COMMERCIAL

## 2019-03-21 DIAGNOSIS — I42.2 HYPERTROPHIC CARDIOMYOPATHY (HCC): ICD-10-CM

## 2019-03-21 PROCEDURE — 93225 XTRNL ECG REC<48 HRS REC: CPT

## 2019-03-21 PROCEDURE — 93226 XTRNL ECG REC<48 HR SCAN A/R: CPT

## 2019-03-31 PROCEDURE — 93227 XTRNL ECG REC<48 HR R&I: CPT | Performed by: INTERNAL MEDICINE

## 2019-04-08 ENCOUNTER — LAB (OUTPATIENT)
Dept: LAB | Facility: HOSPITAL | Age: 64
End: 2019-04-08
Payer: COMMERCIAL

## 2019-04-08 ENCOUNTER — HOSPITAL ENCOUNTER (OUTPATIENT)
Dept: RADIOLOGY | Facility: HOSPITAL | Age: 64
Discharge: HOME | End: 2019-04-08
Payer: COMMERCIAL

## 2019-04-08 ENCOUNTER — HOSPITAL ENCOUNTER (OUTPATIENT)
Dept: NON INVASIVE DIAGNOSTICS | Facility: HOSPITAL | Age: 64
Discharge: HOME | End: 2019-04-08
Payer: COMMERCIAL

## 2019-04-08 DIAGNOSIS — I42.2 HYPERTROPHIC CARDIOMYOPATHY (HCC): ICD-10-CM

## 2019-04-08 DIAGNOSIS — K74.60 LIVER CIRRHOSIS SECONDARY TO NASH (HCC): ICD-10-CM

## 2019-04-08 DIAGNOSIS — K75.81 LIVER CIRRHOSIS SECONDARY TO NASH (HCC): ICD-10-CM

## 2019-04-08 LAB
ALBUMIN SERPL BCP-MCNC: 2.9 G/DL (ref 3.5–5)
ALP SERPL-CCNC: 126 U/L (ref 46–116)
ALT SERPL W P-5'-P-CCNC: 42 U/L (ref 12–78)
ANION GAP SERPL CALCULATED.3IONS-SCNC: 8 MMOL/L (ref 4–13)
AST SERPL W P-5'-P-CCNC: 73 U/L (ref 5–45)
BILIRUB DIRECT SERPL-MCNC: 0.55 MG/DL (ref 0–0.2)
BILIRUB SERPL-MCNC: 2.08 MG/DL (ref 0.2–1)
BUN SERPL-MCNC: 9 MG/DL (ref 5–25)
CALCIUM SERPL-MCNC: 7.7 MG/DL (ref 8.3–10.1)
CHLORIDE SERPL-SCNC: 110 MMOL/L (ref 100–108)
CO2 SERPL-SCNC: 21 MMOL/L (ref 21–32)
CREAT SERPL-MCNC: 1.04 MG/DL (ref 0.6–1.3)
GFR SERPL CREATININE-BSD FRML MDRD: 75 ML/MIN/1.73SQ M
GLUCOSE SERPL-MCNC: 88 MG/DL (ref 65–140)
POTASSIUM SERPL-SCNC: 3.3 MMOL/L (ref 3.5–5.3)
PROT SERPL-MCNC: 7.2 G/DL (ref 6.4–8.2)
SODIUM SERPL-SCNC: 139 MMOL/L (ref 136–145)

## 2019-04-08 PROCEDURE — 93350 STRESS TTE ONLY: CPT

## 2019-04-08 PROCEDURE — 80076 HEPATIC FUNCTION PANEL: CPT

## 2019-04-08 PROCEDURE — 80048 BASIC METABOLIC PNL TOTAL CA: CPT

## 2019-04-08 PROCEDURE — A9585 GADOBUTROL INJECTION: HCPCS | Performed by: RADIOLOGY

## 2019-04-08 PROCEDURE — 36415 COLL VENOUS BLD VENIPUNCTURE: CPT

## 2019-04-08 PROCEDURE — 75561 CARDIAC MRI FOR MORPH W/DYE: CPT

## 2019-04-08 RX ADMIN — GADOBUTROL 22 ML: 604.72 INJECTION INTRAVENOUS at 17:39

## 2019-04-10 LAB
CHEST PAIN STATEMENT: NORMAL
MAX DIASTOLIC BP: 94 MMHG
MAX HEART RATE: 141 BPM
MAX PREDICTED HEART RATE: 155 BPM
MAX. SYSTOLIC BP: 230 MMHG
PROTOCOL NAME: NORMAL
TARGET HR FORMULA: NORMAL
TEST INDICATION: NORMAL
TIME IN EXERCISE PHASE: NORMAL

## 2019-04-18 ENCOUNTER — TELEPHONE (OUTPATIENT)
Dept: GASTROENTEROLOGY | Facility: CLINIC | Age: 64
End: 2019-04-18

## 2019-04-25 ENCOUNTER — TELEPHONE (OUTPATIENT)
Dept: VASCULAR SURGERY | Facility: CLINIC | Age: 64
End: 2019-04-25

## 2019-04-29 DIAGNOSIS — I71.4 ABDOMINAL AORTIC ANEURYSM WITHOUT RUPTURE (HCC): Primary | ICD-10-CM

## 2019-05-23 ENCOUNTER — HOSPITAL ENCOUNTER (OUTPATIENT)
Dept: CT IMAGING | Facility: HOSPITAL | Age: 64
Discharge: HOME | End: 2019-05-23
Attending: SURGERY
Payer: COMMERCIAL

## 2019-05-23 ENCOUNTER — OFFICE VISIT (OUTPATIENT)
Dept: CARDIOLOGY CLINIC | Facility: CLINIC | Age: 64
End: 2019-05-23
Payer: COMMERCIAL

## 2019-05-23 VITALS
HEIGHT: 70 IN | SYSTOLIC BLOOD PRESSURE: 168 MMHG | HEART RATE: 69 BPM | WEIGHT: 255 LBS | BODY MASS INDEX: 36.51 KG/M2 | OXYGEN SATURATION: 98 % | DIASTOLIC BLOOD PRESSURE: 100 MMHG

## 2019-05-23 DIAGNOSIS — I10 ESSENTIAL HYPERTENSION: Primary | ICD-10-CM

## 2019-05-23 DIAGNOSIS — I71.4 ABDOMINAL AORTIC ANEURYSM WITHOUT RUPTURE (HCC): ICD-10-CM

## 2019-05-23 PROCEDURE — 74176 CT ABD & PELVIS W/O CONTRAST: CPT

## 2019-05-23 PROCEDURE — 99214 OFFICE O/P EST MOD 30 MIN: CPT | Performed by: INTERNAL MEDICINE

## 2019-07-19 ENCOUNTER — TELEPHONE (OUTPATIENT)
Dept: GASTROENTEROLOGY | Facility: CLINIC | Age: 64
End: 2019-07-19

## 2019-07-19 NOTE — TELEPHONE ENCOUNTER
Called and left message for patient to schedule egd recall- msg left on cell, tried home but it does not ring  * note- pt should be scheduled at  [address] [place] [address] per alert on endo ecw- pt has letter stating he is a difficult intubation   *

## 2019-07-22 NOTE — TELEPHONE ENCOUNTER
Pt called back, he is having issues with insurance/payment he does not want to schedule right now   He will call back when payement issues are straightened out       # [phone]    ce

## 2019-08-27 LAB — HBA1C MFR BLD HPLC: 5.7 %

## 2019-08-29 NOTE — ANESTHESIA POSTPROCEDURE EVALUATION
Post-Op Assessment Note      CV Status:  Stable    Mental Status:  Alert and awake    Hydration Status:  Euvolemic    PONV Controlled:  Controlled    Airway Patency:  Patent    Post Op Vitals Reviewed: Yes          Staff: Anesthesiologist, CRNA           BP      Temp      Pulse     Resp      SpO2
No

## 2019-09-04 ENCOUNTER — TELEPHONE (OUTPATIENT)
Dept: GASTROENTEROLOGY | Facility: CLINIC | Age: 64
End: 2019-09-04

## 2019-09-04 NOTE — TELEPHONE ENCOUNTER
Pt left lengthy VM mssg; states someone is trying to set up an appt; reports he had a battery of tests for prim Dr Joseph Alvarez at [facility] in [place]/wants Dr Merle Stapleton to review them; Prim recommends he see hem/onc because blood cells are down; asks for [phone]/can leave mssg

## 2019-09-04 NOTE — TELEPHONE ENCOUNTER
Dr Candido Nguyễn has been contacted to schedule recall egd with no success-please advise   Thank you

## 2019-09-04 NOTE — TELEPHONE ENCOUNTER
I contacted [id number], spoke with Crystal Barrios and requested recent labs be faxed to [phone]

## 2019-09-05 NOTE — TELEPHONE ENCOUNTER
Did receive labs from 8/27/19  Platelet level is noted as 83  There is a telephone encounter 7/19/19 requesting scheduling a recall EGD  Message left for the pt to call back to discuss

## 2019-09-06 ENCOUNTER — PREP FOR PROCEDURE (OUTPATIENT)
Dept: GASTROENTEROLOGY | Facility: CLINIC | Age: 64
End: 2019-09-06

## 2019-09-06 DIAGNOSIS — K74.60 LIVER CIRRHOSIS SECONDARY TO NASH (HCC): Primary | ICD-10-CM

## 2019-09-06 DIAGNOSIS — K75.81 LIVER CIRRHOSIS SECONDARY TO NASH (HCC): Primary | ICD-10-CM

## 2019-09-06 NOTE — TELEPHONE ENCOUNTER
Pt ret'd call  He has concerns about his blood levels and referral to hem/onc from primary Dr Altagracia Arteaga  Pt asks for CB from Dr Caro Stahl to his cell [phone]  Pt was OK w/ scheduling EGD at this time (per other TE); call transf'd to Scheduling

## 2019-09-06 NOTE — TELEPHONE ENCOUNTER
EGD scheduled for 10/24/19 at [facility] with Dr Khris Beltran-  since it is previously noted patient is a difficult intubation  Prep visit 10/14/19

## 2019-09-16 NOTE — TELEPHONE ENCOUNTER
Pt due for labs (LFT's, AFP, INR) and US RUQ 10/22/19, then OV  Would recall info change after review of primary labs ordered?

## 2019-09-25 ENCOUNTER — TELEPHONE (OUTPATIENT)
Dept: CARDIOLOGY CLINIC | Facility: CLINIC | Age: 64
End: 2019-09-25

## 2019-09-25 NOTE — TELEPHONE ENCOUNTER
Reviewed labs  Platelets down to 83 from prior baseline in the 90s  Left voicemail providing reassurance  We could discuss things further at his upcoming EGD

## 2019-10-01 ENCOUNTER — TELEPHONE (OUTPATIENT)
Dept: VASCULAR SURGERY | Facility: CLINIC | Age: 64
End: 2019-10-01

## 2019-10-01 NOTE — TELEPHONE ENCOUNTER
Tried to call the patient to set up evar duplex and office visit that he is due for  And called the number and just got dead air  Sent back a message via my chart to let us know the correct number to call

## 2019-10-14 ENCOUNTER — CLINICAL SUPPORT (OUTPATIENT)
Dept: GASTROENTEROLOGY | Facility: CLINIC | Age: 64
End: 2019-10-14

## 2019-10-14 VITALS — HEIGHT: 70 IN | BODY MASS INDEX: 37.22 KG/M2 | WEIGHT: 260 LBS

## 2019-10-14 DIAGNOSIS — K74.60 LIVER CIRRHOSIS SECONDARY TO NASH (HCC): Primary | ICD-10-CM

## 2019-10-14 DIAGNOSIS — K75.81 LIVER CIRRHOSIS SECONDARY TO NASH (HCC): Primary | ICD-10-CM

## 2019-10-23 ENCOUNTER — ANESTHESIA EVENT (OUTPATIENT)
Dept: GASTROENTEROLOGY | Facility: HOSPITAL | Age: 64
End: 2019-10-23

## 2019-10-24 ENCOUNTER — HOSPITAL ENCOUNTER (OUTPATIENT)
Dept: GASTROENTEROLOGY | Facility: HOSPITAL | Age: 64
Setting detail: OUTPATIENT SURGERY
Discharge: HOME | End: 2019-10-24
Attending: INTERNAL MEDICINE | Admitting: INTERNAL MEDICINE
Payer: COMMERCIAL

## 2019-10-24 ENCOUNTER — ANESTHESIA (OUTPATIENT)
Dept: GASTROENTEROLOGY | Facility: HOSPITAL | Age: 64
End: 2019-10-24

## 2019-10-24 VITALS
SYSTOLIC BLOOD PRESSURE: 157 MMHG | HEIGHT: 70 IN | TEMPERATURE: 97.7 F | BODY MASS INDEX: 35.65 KG/M2 | HEART RATE: 58 BPM | WEIGHT: 249 LBS | OXYGEN SATURATION: 96 % | DIASTOLIC BLOOD PRESSURE: 87 MMHG | RESPIRATION RATE: 16 BRPM

## 2019-10-24 DIAGNOSIS — K75.81 LIVER CIRRHOSIS SECONDARY TO NASH (HCC): ICD-10-CM

## 2019-10-24 DIAGNOSIS — K74.60 LIVER CIRRHOSIS SECONDARY TO NASH (HCC): ICD-10-CM

## 2019-10-24 DIAGNOSIS — I86.4 GASTRIC VARIX: Primary | ICD-10-CM

## 2019-10-24 PROCEDURE — 88305 TISSUE EXAM BY PATHOLOGIST: CPT | Performed by: PATHOLOGY

## 2019-10-24 PROCEDURE — 43239 EGD BIOPSY SINGLE/MULTIPLE: CPT | Performed by: INTERNAL MEDICINE

## 2019-10-24 RX ORDER — ONDANSETRON 2 MG/ML
4 INJECTION INTRAMUSCULAR; INTRAVENOUS ONCE AS NEEDED
Status: CANCELLED | OUTPATIENT
Start: 2019-10-24

## 2019-10-24 RX ORDER — LABETALOL 20 MG/4 ML (5 MG/ML) INTRAVENOUS SYRINGE
5 AS NEEDED
Status: CANCELLED | OUTPATIENT
Start: 2019-10-24

## 2019-10-24 RX ORDER — HYDRALAZINE HYDROCHLORIDE 20 MG/ML
5 INJECTION INTRAMUSCULAR; INTRAVENOUS AS NEEDED
Status: CANCELLED | OUTPATIENT
Start: 2019-10-24

## 2019-10-24 RX ORDER — METOCLOPRAMIDE HYDROCHLORIDE 5 MG/ML
10 INJECTION INTRAMUSCULAR; INTRAVENOUS ONCE AS NEEDED
Status: CANCELLED | OUTPATIENT
Start: 2019-10-24

## 2019-10-24 RX ORDER — PROPOFOL 10 MG/ML
INJECTION, EMULSION INTRAVENOUS AS NEEDED
Status: DISCONTINUED | OUTPATIENT
Start: 2019-10-24 | End: 2019-10-24 | Stop reason: SURG

## 2019-10-24 RX ORDER — PROMETHAZINE HYDROCHLORIDE 25 MG/ML
6.25 INJECTION, SOLUTION INTRAMUSCULAR; INTRAVENOUS ONCE AS NEEDED
Status: CANCELLED | OUTPATIENT
Start: 2019-10-24

## 2019-10-24 RX ORDER — MEPERIDINE HYDROCHLORIDE 25 MG/ML
12.5 INJECTION INTRAMUSCULAR; INTRAVENOUS; SUBCUTANEOUS
Status: CANCELLED | OUTPATIENT
Start: 2019-10-24

## 2019-10-24 RX ORDER — NADOLOL 20 MG/1
20 TABLET ORAL DAILY
Qty: 30 TABLET | Refills: 5 | Status: SHIPPED | OUTPATIENT
Start: 2019-10-24

## 2019-10-24 RX ORDER — SODIUM CHLORIDE 9 MG/ML
20 INJECTION, SOLUTION INTRAVENOUS CONTINUOUS
Status: DISCONTINUED | OUTPATIENT
Start: 2019-10-24 | End: 2019-10-25 | Stop reason: HOSPADM

## 2019-10-24 RX ORDER — FENTANYL CITRATE/PF 50 MCG/ML
25 SYRINGE (ML) INJECTION
Status: CANCELLED | OUTPATIENT
Start: 2019-10-24

## 2019-10-24 RX ADMIN — PROPOFOL 100 MG: 10 INJECTION, EMULSION INTRAVENOUS at 07:30

## 2019-10-24 RX ADMIN — PROPOFOL 50 MG: 10 INJECTION, EMULSION INTRAVENOUS at 07:32

## 2019-10-24 RX ADMIN — SODIUM CHLORIDE: 0.9 INJECTION, SOLUTION INTRAVENOUS at 07:27

## 2019-10-24 NOTE — ANESTHESIA POSTPROCEDURE EVALUATION
Post-Op Assessment Note    CV Status:  Stable  Pain Score: 0    Pain management: adequate     Mental Status:  Awake   Hydration Status:  Stable   PONV Controlled:  None   Airway Patency:  Patent and adequate   Post Op Vitals Reviewed: Yes      Staff: Anesthesiologist           /77 (10/24/19 0736)    Temp      Pulse 61 (10/24/19 0736)   Resp 14 (10/24/19 0736)    SpO2 97 % (10/24/19 0736)

## 2019-10-24 NOTE — ANESTHESIA PREPROCEDURE EVALUATION
Review of Systems/Medical History  Patient summary reviewed  Chart reviewed  History of anesthetic complications (easy intubation with video laryncoscope) difficult airway and PONV    Cardiovascular  EKG reviewed, Exercise tolerance (METS): >4,  Hypertension , Aortic disease (AAA s/p EVAR),    Pulmonary  Smoker ex-smoker  ,        GI/Hepatic    Liver disease , cirrhosis,        Negative  ROS        Endo/Other    Obesity    GYN  Negative gynecology ROS          Hematology  Negative hematology ROS      Musculoskeletal  Negative musculoskeletal ROS        Neurology  Negative neurology ROS      Psychology   Negative psychology ROS              Physical Exam    Airway    Mallampati score: II  TM Distance: >3 FB  Neck ROM: full     Dental   No notable dental hx     Cardiovascular  Rhythm: regular, Rate: normal, Cardiovascular exam normal    Pulmonary  Pulmonary exam normal Breath sounds clear to auscultation,     Other Findings        Anesthesia Plan  ASA Score- 3     Anesthesia Type- IV sedation with anesthesia with ASA Monitors  Additional Monitors:   Airway Plan:         Plan Factors-    Induction-     Postoperative Plan-     Informed Consent- Anesthetic plan and risks discussed with patient

## 2019-10-24 NOTE — H&P
History and Physical - [facility]  Felisa Rhodes 71 y o  male MRN: [id number]    HPI: Felisa Rhodes is a 71y o  year old male who presents for upper endoscopy      REVIEW OF SYSTEMS: Per the HPI, and otherwise unremarkable  Historical Information   Past Medical History:   Diagnosis Date    Abdominal aortic aneurysm (AAA) ([facility] [address]     Cancer ([facility] [address] )     skin    Fatty liver     H/O shoulder surgery     Hard to intubate     Grade 4 view with MAC 4, suggest video laryngoscope in future    Hypertension     Liver disease     non- alcoholic    MILLER (nonalcoholic steatohepatitis)     PONV (postoperative nausea and vomiting)      Past Surgical History:   Procedure Laterality Date    BIOPSY CORE NEEDLE N/A 6/26/2018    Procedure: BIOPSY CORE NEEDLE liver;  Surgeon: Pillo Roman MD;  [facility];  Service: General    CHOLANGIOGRAM N/A 6/26/2018    Procedure: CHOLANGIOGRAM intraop; Surgeon: Pillo Roman MD;  [facility];  Service: General    CHOLECYSTECTOMY      COLON SURGERY      colonoscopy    COLONOSCOPY  10/02/2018    EGD  07/30/2018    HERNIA REPAIR      KNEE ARTHROSCOPY      [place] EVASC RPR DPLMNT AORTO-AORTIC NDGFT N/A 4/23/2018    Procedure: REPAIR ANEURYSM ENDOVASCULAR ABDOMINAL AORTIC  (EVAR);   Surgeon: Giovani Pereira MD;  [facility];  Service: Vascular    [place] LAP,CHOLECYSTECTOMY N/A 6/26/2018    Procedure: Ashley De León;  Surgeon: Pillo Roman MD;  [facility];  Service: General    [place] REPAIR UMBILICAL ZJPX,6+D/O,NLZYD N/A 6/26/2018    Procedure: REPAIR HERNIA UMBILICAL;  Surgeon: Pillo Roman MD;  [facility];  Service: General    ROTATOR CUFF REPAIR Right      Social History   Social History     Substance and Sexual Activity   Alcohol Use Yes    Comment: Ocassional, 1 beer a month     Social History     Substance and Sexual Activity   Drug Use No     Social History     Tobacco Use   Smoking Status Former Smoker    Packs/day: 0 25    Years: 15 00    Pack years: 3 75    Types: Cigarettes    Last attempt to quit: Neto Marti Years since quittin 8   Smokeless Tobacco Never Used     Family History   Problem Relation Age of Onset    Pancreatic cancer Mother     Diabetes Mother     Cancer Father     Diabetes Father        Meds/Allergies       (Not in a hospital admission)    No Known Allergies    Objective     BP (!) 182/96   Pulse 66   Temp 97 7 °F (36 5 °C) (Oral)   Resp 18   Ht 5' 10" (1 778 m)   Wt 113 kg (249 lb)   SpO2 95%   BMI 35 73 kg/m²       PHYSICAL EXAM    Gen: NAD  CV: RRR  CHEST: Clear  ABD: soft, NT/ND  EXT: no edema      ASSESSMENT/PLAN:  This is a 71y o  year old male here for upper endoscopy, and he is stable and optimized for his procedure

## 2019-10-28 ENCOUNTER — TELEPHONE (OUTPATIENT)
Dept: GASTROENTEROLOGY | Facility: CLINIC | Age: 64
End: 2019-10-28

## 2019-10-28 DIAGNOSIS — I71.4 ABDOMINAL AORTIC ANEURYSM WITHOUT RUPTURE (HCC): Primary | ICD-10-CM

## 2019-10-28 NOTE — TELEPHONE ENCOUNTER
Spoke with the patient appt made for 11/06/2019 LONNIE and OV 11/25/2019 with deborah in the [facility]

## 2019-11-06 ENCOUNTER — HOSPITAL ENCOUNTER (OUTPATIENT)
Dept: NON INVASIVE DIAGNOSTICS | Facility: HOSPITAL | Age: 64
Discharge: HOME | End: 2019-11-06
Attending: SURGERY
Payer: COMMERCIAL

## 2019-11-06 DIAGNOSIS — I71.4 ABDOMINAL AORTIC ANEURYSM WITHOUT RUPTURE (HCC): ICD-10-CM

## 2019-11-06 PROCEDURE — 93978 VASCULAR STUDY: CPT

## 2019-11-06 PROCEDURE — 93978 VASCULAR STUDY: CPT | Performed by: SURGERY

## 2019-11-25 ENCOUNTER — OFFICE VISIT (OUTPATIENT)
Dept: VASCULAR SURGERY | Facility: CLINIC | Age: 64
End: 2019-11-25
Payer: COMMERCIAL

## 2019-11-25 VITALS
DIASTOLIC BLOOD PRESSURE: 96 MMHG | TEMPERATURE: 98.8 F | HEART RATE: 63 BPM | BODY MASS INDEX: 37.94 KG/M2 | WEIGHT: 265 LBS | SYSTOLIC BLOOD PRESSURE: 142 MMHG | HEIGHT: 70 IN

## 2019-11-25 DIAGNOSIS — I71.4 AAA (ABDOMINAL AORTIC ANEURYSM) WITHOUT RUPTURE (HCC): Primary | Chronic | ICD-10-CM

## 2019-11-25 PROCEDURE — 99213 OFFICE O/P EST LOW 20 MIN: CPT | Performed by: NURSE PRACTITIONER

## 2019-11-25 NOTE — PROGRESS NOTES
Assessment/Plan:  79-year-old male with HTN, liver cirrhosis 2/2 MILLER, AAA s/p EVAR 4/23/18 by Dr Elidia Schultz who returns to the office for yearly office visit and to review EVAR duplex 11/6/19  -CT of the abdomen 5/23/19 showed a stable endograft, decreased aneurysm sac size  -EVAR duplex showed a widely patent endograft with no evidence of endoleak again decreased aneurysmal sac to 4 4 cm, LILLY 1 2/1 2  -BP management  -Continue aspirin 81mg  -Repeat duplex in 1 year  -CT of the abdomen per protocol May 2020  -Follow up in the office in 1 year for risk factor modification         Problem List Items Addressed This Visit        Cardiovascular and Mediastinum    AAA (abdominal aortic aneurysm) without rupture ([address] ) - Primary (Chronic)    Relevant Orders    VAS evar endovascular aortic repair duplex            Subjective:      Patient ID: Adonis Chiu is a 71 y o  male  Pt is here to review results of EVAR duplex done 11/6/2019  Pt is s/p EVAR done 4/23/2018 by Dr Elidia Schultz  Pt denies any abd pain, abd pain after eating, or any sudden low back pain  Pt is taking ASA 81 mg  Pt is a former smoker  HPI  79-year-old male with HTN, liver cirrhosis 2/2 MILLER, AAA s/p EVAR 4/23/18 by Dr Elidia Schultz who returns to the office for yearly office visit and to review EVAR duplex 11/6/19  Patient complains of fatigue  Duplex showed a widely patent endograft with no evidence of endoleak and a decreased aneurysm size, LILLY preserved  He denies any lower extremity claudication symptoms  He is maintained on aspirin 81 mg  Blood pressure 142/96 today in office  Reports he has not his taken BP medication yet today  The following portions of the patient's history were reviewed and updated as appropriate: allergies, current medications, past family history, past medical history, past social history, past surgical history and problem list   Review of systems reviewed    Review of Systems   Constitutional: Positive for fatigue     HENT: Negative  Eyes: Positive for redness  Respiratory: Negative  Cardiovascular: Positive for leg swelling  Gastrointestinal: Negative  Endocrine: Negative  Genitourinary: Negative  Musculoskeletal: Positive for myalgias  Skin: Negative  Allergic/Immunologic: Negative  Neurological: Positive for dizziness  Hematological: Negative  Psychiatric/Behavioral:        Depression         Objective:    I have reviewed and made appropriate changes to the review of systems input by the medical assistant  Vitals:    11/25/19 1621   BP: 142/96   BP Location: Right arm   Patient Position: Sitting   Cuff Size: Large   Pulse: 63   Temp: 98 8 °F (37 1 °C)   TempSrc: Tympanic   Weight: 120 kg (265 lb)   Height: 5' 10" (1 778 m)       Patient Active Problem List   Diagnosis    AAA (abdominal aortic aneurysm) without rupture (HCC)    Hyperbilirubinemia    Transaminitis    Essential hypertension    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 (HCC)    Cholelithiasis    Diastolic dysfunction    Hemoptysis       Past Surgical History:   Procedure Laterality Date    BIOPSY CORE NEEDLE N/A 6/26/2018    Procedure: BIOPSY CORE NEEDLE liver;  Surgeon: Jania Alonzo MD;  [facility];  Service: General    CHOLANGIOGRAM N/A 6/26/2018    Procedure: CHOLANGIOGRAM intraop; Surgeon: Jania Alonzo MD;  [facility];  Service: General    CHOLECYSTECTOMY      COLON SURGERY      colonoscopy    COLONOSCOPY  10/02/2018    EGD  07/30/2018    HERNIA REPAIR      KNEE ARTHROSCOPY      [place] EVASC RPR DPLMNT AORTO-AORTIC NDGFT N/A 4/23/2018    Procedure: REPAIR ANEURYSM ENDOVASCULAR ABDOMINAL AORTIC  (EVAR);   Surgeon: Alma Rosa Cuellar MD;  [facility];  Service: Vascular    [place] LAP,CHOLECYSTECTOMY N/A 6/26/2018    Procedure: CHOLECYSTECTOMY LAPAROSCOPIC;  Surgeon: Jania Alonzo MD;  [facility];  Service: General    [place] REPAIR UMBILICAL YSZH,3+J/V,GSDUZ N/A 6/26/2018    Procedure: REPAIR HERNIA UMBILICAL;  Surgeon: Nazario Suarez MD;  [facility];  Service: General    ROTATOR CUFF REPAIR Right        Family History   Problem Relation Age of Onset    Pancreatic cancer Mother     Diabetes Mother     Cancer Father     Diabetes Father        Social History     Socioeconomic History    Marital status: Single     Spouse name: Not on file    Number of children: Not on file    Years of education: Not on file    Highest education level: Not on file   Occupational History    Not on file   Social Needs    Financial resource strain: Not on file    Food insecurity:     Worry: Not on file     Inability: Not on file    Transportation needs:     Medical: Not on file     Non-medical: Not on file   Tobacco Use    Smoking status: Former Smoker     Packs/day: 0 25     Years: 15 00     Pack years: 3 75     Types: Cigarettes     Last attempt to quit: 1990     Years since quittin 9    Smokeless tobacco: Never Used   Substance and Sexual Activity    Alcohol use: Yes     Comment: Ocassional, 1 beer a month    Drug use: No    Sexual activity: Not on file   Lifestyle    Physical activity:     Days per week: Not on file     Minutes per session: Not on file    Stress: Not on file   Relationships    Social connections:     Talks on phone: Not on file     Gets together: Not on file     Attends Qu[employer] service: Not on file     Active member of club or organization: Not on file     Attends meetings of clubs or organizations: Not on file     Relationship status: Not on file    Intimate partner violence:     Fear of current or ex partner: Not on file     Emotionally abused: Not on file     Physically abused: Not on file     Forced sexual activity: Not on file   Other Topics Concern    Not on file   Social History Narrative    WORK:    1  Government    2  Technology    3     x 40YMO - denies exposure    -  Denies dust/gas/fume        HOBBIES:    -  Denies        PETS:    - Previous dogs/cats    -  None currently    -  Denies birds        TRAVEL:    -  Previous travel to [place] - significant illness w/ cough        EXPOSURES:    -  Denies down pillows, hot tubs    -  No current mold exposure    -  Childhood exposure to pesticides/fertilizers           No Known Allergies      Current Outpatient Medications:     aspirin (ECOTRIN LOW STRENGTH) 81 mg EC tablet, Take 81 mg by mouth daily, Disp: , Rfl:     B Complex Vitamins (VITAMIN-B COMPLEX PO), Take by mouth, Disp: , Rfl:     Magnesium 400 MG CAPS, Take by mouth, Disp: , Rfl:     Multiple Vitamin (MULTIVITAMIN) capsule, Take 1 capsule by mouth daily, Disp: , Rfl:     nadolol (CORGARD) 20 mg tablet, Take 1 tablet (20 mg total) by mouth daily, Disp: 30 tablet, Rfl: 5    Omega-3 Fatty Acids (FISH OIL) 1,000 mg, Take 1,000 mg by mouth daily, Disp: , Rfl:     sildenafil (REVATIO) 20 mg tablet, every 24 hours, Disp: , Rfl:     zolpidem (AMBIEN) 10 mg tablet, Take 10 mg by mouth daily at bedtime as needed  , Disp: , Rfl:     citalopram (CeleXA) 40 mg tablet, every 24 hours, Disp: , Rfl:     CLENPIQ 10-3 5-12 MG-GM -GM/160ML SOLN, TAKE 2 doses BY MOUTH AS DIRECTED, Disp: , Rfl: 0    verapamil (CALAN-SR) 120 mg CR tablet, Take 1 tablet (120 mg total) by mouth daily at bedtime (Patient not taking: Reported on 11/25/2019), Disp: 90 tablet, Rfl: 3    /96 (BP Location: Right arm, Patient Position: Sitting, Cuff Size: Large)   Pulse 63   Temp 98 8 °F (37 1 °C) (Tympanic)   Ht 5' 10" (1 778 m)   Wt 120 kg (265 lb)   BMI 38 02 kg/m²          Physical Exam   Constitutional: He is oriented to person, place, and time  He appears well-developed and well-nourished  HENT:   Head: Normocephalic and atraumatic  Eyes: EOM are normal    Neck: Neck supple  Cardiovascular: Normal heart sounds  Pulses:       Femoral pulses are 2+ on the right side, and 2+ on the left side         Dorsalis pedis pulses are 0 on the right side, and 0 on the left side  Posterior tibial pulses are 0 on the right side, and 0 on the left side  Pulmonary/Chest: Effort normal and breath sounds normal    Abdominal: Soft  Bowel sounds are normal    Obese abdomen    Musculoskeletal: Normal range of motion  He exhibits edema  1+ BLE tibial edema   Neurological: He is alert and oriented to person, place, and time  Skin: Skin is warm  Psychiatric: His behavior is normal    Nursing note and vitals reviewed

## 2019-12-03 ENCOUNTER — OFFICE VISIT (OUTPATIENT)
Dept: PULMONOLOGY | Facility: CLINIC | Age: 64
End: 2019-12-03
Payer: COMMERCIAL

## 2019-12-03 VITALS
SYSTOLIC BLOOD PRESSURE: 175 MMHG | HEART RATE: 62 BPM | OXYGEN SATURATION: 98 % | BODY MASS INDEX: 37.31 KG/M2 | TEMPERATURE: 95.7 F | HEIGHT: 70 IN | WEIGHT: 260.6 LBS | DIASTOLIC BLOOD PRESSURE: 104 MMHG

## 2019-12-03 DIAGNOSIS — K75.81 LIVER CIRRHOSIS SECONDARY TO NASH (HCC): ICD-10-CM

## 2019-12-03 DIAGNOSIS — K74.60 LIVER CIRRHOSIS SECONDARY TO NASH (HCC): ICD-10-CM

## 2019-12-03 DIAGNOSIS — I10 ESSENTIAL HYPERTENSION: ICD-10-CM

## 2019-12-03 DIAGNOSIS — D69.6 THROMBOCYTOPENIA (HCC): ICD-10-CM

## 2019-12-03 DIAGNOSIS — G47.9 SLEEP DISTURBANCE: Primary | ICD-10-CM

## 2019-12-03 PROBLEM — R04.2 HEMOPTYSIS: Status: RESOLVED | Noted: 2018-12-10 | Resolved: 2019-12-03

## 2019-12-03 PROCEDURE — 99215 OFFICE O/P EST HI 40 MIN: CPT | Performed by: INTERNAL MEDICINE

## 2019-12-03 NOTE — PROGRESS NOTES
Progress Note - Pulmonary   Algis Severe 71 y o  male MRN: [id number]   Encounter: [id number]      Assessment/Plan:  Patient is a 18-year-old male with past medical history significant for MILLER cirrhosis who presents for followup specifically focusing on sleep abnormalities  Sleep disturbance  The patient reports fragmented sleep at night  The patient is under a significant amount of stress and has a high degree of posttraumatic stress disorder related to his time working for the [employer]  An extensive discussion was held with the patient on the need for counseling and therapy to which the patient agrees  The patient was also counseled on the need for appropriate sleep hygiene including regular bedtime, avoidance of screens prior to bed, avoidance of caffeine and increase and exercise  Given the fragmented nature of sleep, will hold on sleep study at this time  MILLER  Patient is followed by GI  He reports his symptoms are well controlled at this time  He is on nadolol which may have some mild beta blocking affects symptoms started prior to initiation of this medication  Thrombocytopenia - asked patient to discuss with his GI doctor and vascular surgeon continuing a platelet inhibition  Addition as patient provide recent blood work to confirm his reported thrombocytopenia  He denies any anemia  Hypertension  Currently medically managed although is high at today's visit  Encouraged patient to increase exercise  Obesity  -  counseled on need for weight loss  Greater than 50% of a total visit lasting 50 minutes was spent counseling the patient on posttraumatic stress disorder and its effect on sleep disturbance and daytime sleepiness  Pt may follow up in 8 weeks or sooner as necessary  Plan:  -  Counseled on need for improved sleep hygiene   -  Regular bed time   -  Avoid caffeine   -  Exercise    -  MILLER   -  Asked patient to fax blood work results    Subjective:    The patient reports undergoing treatment for MILLER  He is followed by a hematologist for his thrombocytopenia  He denies bloody stools or dark black stools  The patient reports difficulty sleeping extreme daytime fatigue and sleepiness  He reports it is progressive changes over the past several months  He is taking [place] on an as needed basis which is about 3/7 days and will provide occasional benefit  He reports that he was having significant muscle weakness which started in the beginning of 2019 and has only improved over the past several weeks  The patient has a history of abdominal aortic aneurysm s/p repair in April with aortoilliac stent graft  He was started on Nadolol but reports the fatigue predates this medication  He notes the fatigue started the beginning of this year  Inhaler Regimen:  None    Remainder of 12 point review of systems negative except as described in HPI  The following portions of the patient's history were reviewed and updated as appropriate: allergies, current medications, past family history, past medical history, past social history, past surgical history and problem list      Objective:   Vitals: Blood pressure (!) 175/104, pulse 62, temperature (!) 95 7 °F (35 4 °C), height 5' 10" (1 778 m), weight 118 kg (260 lb 9 6 oz), SpO2 98 % , RA, Body mass index is 37 39 kg/m²  Physical Exam  Gen: Awake, alert, oriented x 3, no acute distress  HEENT: Mucous membranes moist, no oral lesions, no thrush  NECK: No accessory muscle use, JVP not elevated  Cardiac: Regular, single S1, single S2, no murmurs, no rubs, no gallops  Lungs: CTA b/l  Abdomen: normoactive bowel sounds, soft nontender, nondistended, no rebound or rigidity, no guarding; obese  Extremities: no cyanosis, no clubbing, trace lower extremity edema  MSK:  Strength equal in all extremities  Derm:  No rashes/lesions noted  Neuro:  Appropriate mood/affect; anxious    Labs:  I have personally reviewed pertinent lab results  Lab Results   Component Value Date    WBC 5 68 06/26/2018    HGB 14 0 06/26/2018    PLT 96 (L) 06/26/2018     Lab Results   Component Value Date    CREATININE 1 04 04/08/2019      Imaging and other studies: I have personally reviewed pertinent reports  and I have personally reviewed pertinent films in PACS  CT A/P  5/23/2019:  LOWER CHEST:  Paraesophageal varices  LIVER/BILIARY TREE:  Cirrhotic  Pulmonary Function Testing:   No pulmonary function testing available for review  John Starr

## 2019-12-04 ENCOUNTER — TELEPHONE (OUTPATIENT)
Dept: GASTROENTEROLOGY | Facility: CLINIC | Age: 64
End: 2019-12-04

## 2019-12-04 DIAGNOSIS — K74.60 LIVER CIRRHOSIS SECONDARY TO NASH (HCC): Primary | ICD-10-CM

## 2019-12-04 DIAGNOSIS — R93.3 ABNORMAL FINDINGS ON RADIOLOGICAL EXAMINATION OF GASTROINTESTINAL TRACT: ICD-10-CM

## 2019-12-04 DIAGNOSIS — K75.81 LIVER CIRRHOSIS SECONDARY TO NASH (HCC): Primary | ICD-10-CM

## 2019-12-04 NOTE — TELEPHONE ENCOUNTER
Order placed, called [phone], no answer, left message that annual ultrasound for liver due in December, order to SL, provided scheduling phone number and also our office to call to schedule folllow up after ultrasound completed

## 2019-12-04 NOTE — TELEPHONE ENCOUNTER
1-yr recall Mary Ambriz indicates Pt to sched OV; mult mssgs left for Pt to CB      Pended to nurse for signature

## 2019-12-05 ENCOUNTER — TELEPHONE (OUTPATIENT)
Dept: CARDIOLOGY CLINIC | Facility: CLINIC | Age: 64
End: 2019-12-05

## 2019-12-27 ENCOUNTER — HOSPITAL ENCOUNTER (OUTPATIENT)
Dept: ULTRASOUND IMAGING | Facility: CLINIC | Age: 64
Discharge: HOME | End: 2019-12-27
Payer: COMMERCIAL

## 2019-12-27 DIAGNOSIS — K74.60 LIVER CIRRHOSIS SECONDARY TO NASH (HCC): ICD-10-CM

## 2019-12-27 DIAGNOSIS — K75.81 LIVER CIRRHOSIS SECONDARY TO NASH (HCC): ICD-10-CM

## 2019-12-27 DIAGNOSIS — R93.3 ABNORMAL FINDINGS ON RADIOLOGICAL EXAMINATION OF GASTROINTESTINAL TRACT: ICD-10-CM

## 2019-12-27 PROCEDURE — 76705 ECHO EXAM OF ABDOMEN: CPT

## 2020-02-03 ENCOUNTER — PATIENT MESSAGE (OUTPATIENT)
Dept: PULMONOLOGY | Facility: CLINIC | Age: 65
End: 2020-02-03

## 2020-02-10 ENCOUNTER — TELEPHONE (OUTPATIENT)
Dept: PULMONOLOGY | Facility: CLINIC | Age: 65
End: 2020-02-10

## 2020-02-10 NOTE — TELEPHONE ENCOUNTER
Pt lvm stating he will reschedule his appointment when he has updated his insurance  He also stated that he is going to a new gym, [employer] [facility], for his PT  He stated he is able to pay $50 a month, but he needs a doctors note stating reasoning for using their gym  Fax#: [phone] He states please give him a call with any further questions

## 2020-02-10 NOTE — TELEPHONE ENCOUNTER
Lmovm to inform pt that, per Dr Tsering Álvarez, pt would have to have an office visit before he can write note for him  Stated on vm that as soon as he gets his insurance updated to call the office to set up an appointment

## 2020-07-14 ENCOUNTER — OFFICE VISIT (OUTPATIENT)
Dept: GASTROENTEROLOGY | Facility: CLINIC | Age: 65
End: 2020-07-14
Payer: MEDICARE

## 2020-07-14 ENCOUNTER — TELEPHONE (OUTPATIENT)
Dept: GASTROENTEROLOGY | Facility: CLINIC | Age: 65
End: 2020-07-14

## 2020-07-14 VITALS
HEIGHT: 70 IN | TEMPERATURE: 99.5 F | HEART RATE: 76 BPM | BODY MASS INDEX: 37.22 KG/M2 | SYSTOLIC BLOOD PRESSURE: 154 MMHG | DIASTOLIC BLOOD PRESSURE: 94 MMHG | WEIGHT: 260 LBS

## 2020-07-14 DIAGNOSIS — R18.8 OTHER ASCITES: ICD-10-CM

## 2020-07-14 DIAGNOSIS — K75.81 LIVER CIRRHOSIS SECONDARY TO NASH (HCC): Primary | ICD-10-CM

## 2020-07-14 DIAGNOSIS — K74.60 LIVER CIRRHOSIS SECONDARY TO NASH (HCC): Primary | ICD-10-CM

## 2020-07-14 DIAGNOSIS — D68.9 COAGULOPATHY (HCC): ICD-10-CM

## 2020-07-14 DIAGNOSIS — D69.6 THROMBOCYTOPENIA (HCC): ICD-10-CM

## 2020-07-14 DIAGNOSIS — Z86.010 HISTORY OF COLON POLYPS: ICD-10-CM

## 2020-07-14 PROCEDURE — 99214 OFFICE O/P EST MOD 30 MIN: CPT | Performed by: NURSE PRACTITIONER

## 2020-07-14 RX ORDER — SPIRONOLACTONE 50 MG/1
50 TABLET, FILM COATED ORAL DAILY
Qty: 30 TABLET | Refills: 5 | Status: SHIPPED | OUTPATIENT
Start: 2020-07-14

## 2020-07-14 RX ORDER — FUROSEMIDE 40 MG/1
20 TABLET ORAL DAILY
Qty: 30 TABLET | Refills: 5 | Status: SHIPPED | OUTPATIENT
Start: 2020-07-14 | End: 2020-09-10

## 2020-07-14 RX ORDER — LANOLIN ALCOHOL/MO/W.PET/CERES
1 CREAM (GRAM) TOPICAL 2 TIMES DAILY
COMMUNITY

## 2020-07-15 ENCOUNTER — TELEPHONE (OUTPATIENT)
Dept: GASTROENTEROLOGY | Facility: CLINIC | Age: 65
End: 2020-07-15

## 2020-07-15 NOTE — TELEPHONE ENCOUNTER
Patient Liver Transplant Evaluation Request form completed, awaiting Asia's Progress note to be completed so I can fax

## 2020-07-15 NOTE — PROGRESS NOTES
[facility] - Outpatient Follow-up Note  Delicia Feliciano 76 y o  male MRN: [id number]  Encounter: [id number]    ASSESSMENT AND PLAN:      1  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 (HCC)    MELD score  21  Had been compensated up until recently  Recently developed new onset of jaundice, ascites, bilateral lower extremity edema, hepatic encephalopathy, elevated LFTs and worsening thrombocytopenia and coagulopathy  Referral to [facility] for consideration of a liver transplant         - Comprehensive metabolic panel; Future  - Protime-INR; Future  - Hepatic function panel; Future  - Basic metabolic panel; Future  - AFP tumor marker; Future  - Ammonia; Future    - I am not certain that he would be a suitable candidate for a liver transplant due to cardiomyopathy  He is scheduled to have a cardiac evaluation in the near future  2  Jaundice/elevated LFTs  New onset of jaundice/elevated LFTs secondary to worsening liver disease     - repeat LFTs in 2 weeks    3  Other ascites  New onset of ascites over the past couple of weeks  Abdomen does not appear to be markedly distended  A CT of the abdomen/pelvis that was recently performed for abdominal pain with a history of an abdominal aortic aneurysm, revealed new ascites within the abdomen/pelvis but the amount was not mentioned  In regard to the abdominal aortic aneurysm this measured 4 1 and treated by a stent graft  Satisfactory postoperative appearance  - furosemide (LASIX) 40 mg tablet; Take 0 5 tablets (20 mg total) by mouth daily  Dispense: 30 tablet; Refill: 5  - spironolactone (ALDACTONE) 50 mg tablet; Take 1 tablet (50 mg total) by mouth daily  Dispense: 30 tablet; Refill: 5    3  Coagulopathy (HCC)  PT/INR slightly increased over the past 2 years  INR 1 89    4  Thrombocytopenia (HCC)  Worsening  Platelets 620 years ago and now 68     5  History of colon polyps  6   Screening colonoscopy  Up-to-date with surveillance colonoscopy  Last colonoscopy performed in October of 2018 showed 2 adenomatous colon polyps  A repeat colonoscopy advised in 5 years  7  Abdominal aortic aneurysm - treated by stent graft  Recent CT scan measured the aneurysm 4 1  Satisfactory postoperative appearance  8  Cardiomyopathy, hypertrophic  Unclear as to what his ejection fraction is  He has an upcoming appointment with ATC  - scheduled appointment with cardiology    9  Esophageal/gastric varices noted on recent cat scan  Only one varix noted on upper endoscopy October of 2019     - continue Nadolol 20 mg daily  - repeat EGD 10/2020    Followup Appointment:   3 months  ______________________________________________________________________    Chief Complaint   Patient presents with   [facility]     liver     HPI:    Presents to the office following recent emergency room visitation a [facility] when he presented with severe weakness that was progressive for several days  Associated with epigastric discomfort with a history of a known AAA  Showed new onset of ascitic fluid and extensive, new esophageal and gastric varices  Additionally, he had new jaundice/scleral icterus  LFTs were elevated with a bilirubin up to 7  He also had been experiencing bilateral lower extremity edema for the past several days  Ammonia level was also found to be elevated  Denies any melena, rectal bleeding or hematemesis  Gained over 20 lbs over the past several months      Historical Information   Past Medical History:   Diagnosis Date    [facility]     Cancer ([facility] )     skin    Colon polyp     Fatty liver     H/O shoulder surgery     Hard to intubate     Grade 4 view with MAC 4, suggest video laryngoscope in future    Hypertension     Liver disease     non- alcoholic    MILLER (nonalcoholic steatohepatitis)     PONV (postoperative nausea and vomiting)      Past Surgical History:   Procedure Laterality Date    BIOPSY CORE NEEDLE N/A 2018    Procedure: BIOPSY CORE NEEDLE liver;  Surgeon: Gabbie Knight MD;  [facility];  Service: General    CHOLANGIOGRAM N/A 2018    Procedure: CHOLANGIOGRAM intraop; Surgeon: Gabbie Knight MD;  [facility];  Service: General    CHOLECYSTECTOMY      COLON SURGERY      colonoscopy    COLONOSCOPY  10/02/2018    COLONOSCOPY  10/2018    adenoma  Five year recall   EGD  2018    HERNIA REPAIR      KNEE ARTHROSCOPY      CO EVASC RPR DPLMNT AORTO-AORTIC NDGFT N/A 2018    Procedure: REPAIR ANEURYSM ENDOVASCULAR ABDOMINAL AORTIC  (EVAR);   Surgeon: Josué Steiner MD;  [facility];  Service: Vascular    CO LAP,CHOLECYSTECTOMY N/A 2018    Procedure: Toi Melena;  Surgeon: Gabbie Knight MD;  [facility];  Service: General    CO REPAIR UMBILICAL FNEJ,9+P/M,EBYKV N/A 2018    Procedure: REPAIR HERNIA UMBILICAL;  Surgeon: Gabbie Knight MD;  [facility];  Service: General    ROTATOR CUFF REPAIR Right     UPPER GASTROINTESTINAL ENDOSCOPY  10/2019    one varix     Social History     Substance and Sexual Activity   Alcohol Use Yes    Comment: Ocassional, 1 beer a month     Social History     Substance and Sexual Activity   Drug Use No     Social History     Tobacco Use   Smoking Status Former Smoker    Packs/day: 0 25    Years: 15 00    Pack years: 3 75    Types: Cigarettes    Last attempt to quit: Sergey Jordan Years since quittin 5   Smokeless Tobacco Never Used     Family History   Problem Relation Age of Onset    Pancreatic cancer Mother     Diabetes Mother     Cancer Father     Diabetes Father          Current Outpatient Medications:     B Complex Vitamins (VITAMIN-B COMPLEX PO)    calcium citrate-vitamin D (CITRACAL+D) 315-200 MG-UNIT per tablet    Magnesium 400 MG CAPS    Multiple Vitamin (MULTIVITAMIN) capsule    nadolol (CORGARD) 20 mg tablet    Omega-3 Fatty Acids (FISH OIL) 1,000 mg    potassium phosphate, monobasic, (K-PHOS) 500 MG tablet    zolpidem (AMBIEN) 10 mg tablet    aspirin (ECOTRIN LOW STRENGTH) 81 mg EC tablet    citalopram (CeleXA) 40 mg tablet    CLENPIQ 10-3 5-12 MG-GM -GM/160ML SOLN    furosemide (LASIX) 40 mg tablet    sildenafil (REVATIO) 20 mg tablet    spironolactone (ALDACTONE) 50 mg tablet    verapamil (CALAN-SR) 120 mg CR tablet  No Known Allergies  Reviewed medications and allergies and updated as indicated    PHYSICAL EXAM:    Blood pressure 154/94, pulse 76, temperature 99 5 °F (37 5 °C), height 5' 10" (1 778 m), weight 118 kg (260 lb)  Body mass index is 37 31 kg/m²  General Appearance: NAD, cooperative, alert  Eyes: Icteric  ENT:  Normocephalic  Resp:  Clear to auscultation bilaterally; no rales, rhonchi or wheezing; respirations unlabored   CV:  S1 S2, Regular rate and rhythm; no murmur, rub, or gallop  GI:  Soft, non-tender, mildly distended; normal bowel sounds; no masses, no organomegaly   Rectal: Deferred  Skin:  + jaundice  Psych: Normal affect, good eye contact  Neuro: No gross deficits, AAOx3, no asterixis  Ext: +3 B LE edema    Lab Results:   Lab Results   Component Value Date    WBC 5 68 06/26/2018    HGB 14 0 06/26/2018    HCT 41 1 06/26/2018    MCV 96 06/26/2018    PLT 96 (L) 06/26/2018     Lab Results   Component Value Date    K 3 3 (L) 04/08/2019     (H) 04/08/2019    CO2 21 04/08/2019    BUN 9 04/08/2019    CREATININE 1 04 04/08/2019    GLUCOSE 164 (H) 03/07/2018    GLUF 95 12/18/2018    CALCIUM 7 7 (L) 04/08/2019    AST 73 (H) 04/08/2019    ALT 42 04/08/2019    ALKPHOS 126 (H) 04/08/2019    EGFR 75 04/08/2019     No results found for: IRON, TIBC, FERRITIN  Lab Results   Component Value Date    LIPASE 227 06/04/2018       Radiology Results:   No results found

## 2020-07-21 ENCOUNTER — TRANSCRIBE ORDERS (OUTPATIENT)
Dept: ADMINISTRATIVE | Facility: HOSPITAL | Age: 65
End: 2020-07-21

## 2020-07-21 ENCOUNTER — LAB (OUTPATIENT)
Dept: LAB | Facility: HOSPITAL | Age: 65
End: 2020-07-21
Payer: MEDICARE

## 2020-07-21 DIAGNOSIS — K74.60 LIVER CIRRHOSIS SECONDARY TO NASH (HCC): ICD-10-CM

## 2020-07-21 DIAGNOSIS — K75.81 NONALCOHOLIC STEATOHEPATITIS: ICD-10-CM

## 2020-07-21 DIAGNOSIS — K75.81 LIVER CIRRHOSIS SECONDARY TO NASH (HCC): ICD-10-CM

## 2020-07-21 DIAGNOSIS — K75.81 NONALCOHOLIC STEATOHEPATITIS: Primary | ICD-10-CM

## 2020-07-21 LAB
AFP-TM SERPL-MCNC: 3.6 NG/ML (ref 0.5–8)
ALBUMIN SERPL BCP-MCNC: 1.6 G/DL (ref 3.5–5)
ALP SERPL-CCNC: 219 U/L (ref 46–116)
ALT SERPL W P-5'-P-CCNC: 102 U/L (ref 12–78)
AMMONIA PLAS-SCNC: 41 UMOL/L (ref 11–35)
ANION GAP SERPL CALCULATED.3IONS-SCNC: 7 MMOL/L (ref 4–13)
AST SERPL W P-5'-P-CCNC: 231 U/L (ref 5–45)
BILIRUB DIRECT SERPL-MCNC: 3.28 MG/DL (ref 0–0.2)
BILIRUB SERPL-MCNC: 5.1 MG/DL (ref 0.2–1)
BUN SERPL-MCNC: 10 MG/DL (ref 5–25)
CALCIUM SERPL-MCNC: 7.8 MG/DL (ref 8.3–10.1)
CHLORIDE SERPL-SCNC: 107 MMOL/L (ref 100–108)
CO2 SERPL-SCNC: 24 MMOL/L (ref 21–32)
CREAT SERPL-MCNC: 1.08 MG/DL (ref 0.6–1.3)
GFR SERPL CREATININE-BSD FRML MDRD: 71 ML/MIN/1.73SQ M
GLUCOSE SERPL-MCNC: 95 MG/DL (ref 65–140)
INR PPP: 1.99 (ref 0.84–1.19)
POTASSIUM SERPL-SCNC: 3.6 MMOL/L (ref 3.5–5.3)
PROT SERPL-MCNC: 6.9 G/DL (ref 6.4–8.2)
PROTHROMBIN TIME: 22.3 SECONDS (ref 11.6–14.5)
SODIUM SERPL-SCNC: 138 MMOL/L (ref 136–145)

## 2020-07-21 PROCEDURE — 82248 BILIRUBIN DIRECT: CPT

## 2020-07-21 PROCEDURE — 36415 COLL VENOUS BLD VENIPUNCTURE: CPT

## 2020-07-21 PROCEDURE — 82105 ALPHA-FETOPROTEIN SERUM: CPT

## 2020-07-21 PROCEDURE — 80053 COMPREHEN METABOLIC PANEL: CPT

## 2020-07-21 PROCEDURE — 85610 PROTHROMBIN TIME: CPT

## 2020-07-21 PROCEDURE — 82140 ASSAY OF AMMONIA: CPT

## 2020-07-21 NOTE — TELEPHONE ENCOUNTER
GARRETT for pt to cb and [facility]any [place] Pt in with C/O headache, cough, nausea and vomiting. Pt reports hx of migraines in the past. No medications PTA. Pt A&Ox4 ABCD's intact.   no back pain, no gout, no musculoskeletal pain, no neck pain, and no weakness.

## 2020-07-24 ENCOUNTER — TELEPHONE (OUTPATIENT)
Dept: GASTROENTEROLOGY | Facility: CLINIC | Age: 65
End: 2020-07-24

## 2020-07-31 ENCOUNTER — TELEPHONE (OUTPATIENT)
Dept: GASTROENTEROLOGY | Facility: CLINIC | Age: 65
End: 2020-07-31

## 2020-07-31 NOTE — TELEPHONE ENCOUNTER
Patient left message he has appt with Dr Tommy Fall in [place] on 8/18/2020 at 10:45  Their office # is [phone]

## 2020-07-31 NOTE — TELEPHONE ENCOUNTER
Can you please send last office visit know, blood work and imaging studies to Dr Karla Keane office since patient has an appointment

## 2020-08-03 NOTE — TELEPHONE ENCOUNTER
Records faxed via routing in [facility] [address] and I confirm receipt with Cirstiana Galeano at [place]

## 2020-08-19 ENCOUNTER — TELEPHONE (OUTPATIENT)
Dept: GASTROENTEROLOGY | Facility: CLINIC | Age: 65
End: 2020-08-19

## 2020-08-19 NOTE — TELEPHONE ENCOUNTER
Urgent Record Request received from [facility]  I had originally faxed the referral along with office visit note dated 7/14/20, evaluation request form, progress note from [place], old progress note from GI 2018, CT A/P, US  Total of 20 pages  This time she requested recent office notes and upper endoscopy report  I faxed 18 pages including Asia's most recent office visit note from 7/14, EGD/Path Report from 10/24/19, recent cardiology note and labs, confirmation fax received  I also left Jody deleon advising if she didn't receive to call us as Goyo Loving spoke with Maci Cotton  This am and Maria Esther Bradford confirmed that the original records were in the chart despite Dr Sonu Ponce note that mentioned outside records were not received (they were)

## 2020-09-10 ENCOUNTER — OFFICE VISIT (OUTPATIENT)
Dept: GASTROENTEROLOGY | Facility: CLINIC | Age: 65
End: 2020-09-10
Payer: MEDICARE

## 2020-09-10 ENCOUNTER — TELEPHONE (OUTPATIENT)
Dept: GASTROENTEROLOGY | Facility: CLINIC | Age: 65
End: 2020-09-10

## 2020-09-10 VITALS
SYSTOLIC BLOOD PRESSURE: 130 MMHG | WEIGHT: 252 LBS | DIASTOLIC BLOOD PRESSURE: 80 MMHG | HEIGHT: 70 IN | BODY MASS INDEX: 36.08 KG/M2 | TEMPERATURE: 98.2 F

## 2020-09-10 DIAGNOSIS — K74.60 LIVER CIRRHOSIS SECONDARY TO NASH (HCC): ICD-10-CM

## 2020-09-10 DIAGNOSIS — D68.9 COAGULOPATHY (HCC): ICD-10-CM

## 2020-09-10 DIAGNOSIS — R19.7 DIARRHEA, UNSPECIFIED TYPE: ICD-10-CM

## 2020-09-10 DIAGNOSIS — Z86.010 HISTORY OF COLON POLYPS: ICD-10-CM

## 2020-09-10 DIAGNOSIS — D69.6 THROMBOCYTOPENIA (HCC): ICD-10-CM

## 2020-09-10 DIAGNOSIS — G47.00 INSOMNIA, UNSPECIFIED TYPE: ICD-10-CM

## 2020-09-10 DIAGNOSIS — R18.8 OTHER ASCITES: Primary | ICD-10-CM

## 2020-09-10 DIAGNOSIS — K75.81 LIVER CIRRHOSIS SECONDARY TO NASH (HCC): ICD-10-CM

## 2020-09-10 PROCEDURE — 99214 OFFICE O/P EST MOD 30 MIN: CPT | Performed by: NURSE PRACTITIONER

## 2020-09-10 RX ORDER — TRAZODONE HYDROCHLORIDE 50 MG/1
50 TABLET ORAL
Qty: 30 TABLET | Refills: 0 | Status: SHIPPED | OUTPATIENT
Start: 2020-09-10

## 2020-09-10 NOTE — PATIENT INSTRUCTIONS
2 g sodium diet   1 5 L fluid restriction   Continue current medication   Obtain stool studies   Trazodone 50 mg at HS   Arrange for an upper endoscopy

## 2020-09-10 NOTE — PROGRESS NOTES
[facility] Outpatient Follow-up [facility]  Stanton Barajas 76 y o  male MRN: [id number]  Encounter: [id number]    ASSESSMENT AND PLAN:      1  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 [facility])  Recent MELD score 21  Compensated up until the past few months  Complicated by new onset of jaundice, ascites, bilateral lower extremity edema, hepatic encephalopathy, elevated LFTs and worsening thrombocytopenia and coagulopathy  Scheduled at [facility] with Dr Kam Betancourt on the 21st of this month  for consideration of a liver transplant  I am not certain that he would be a suitable candidate for a liver transplant due to multiple cardiac issues but will defer to Dr Jayson Campos     - Comprehensive metabolic panel; Future  - Protime-INR; Future  - Hepatic function panel; Future  - Basic metabolic panel; Future  - AFP tumor marker; Future  - Ammonia; Future  -  [address]  surveillance every 6 months to include the above blood work and hepatic ultrasound      2  Other ascites  New onset of ascites over the past several weeks  A CT of the abdomen/pelvis performed in June of this year for the indication ofabdominal pain  revealed new ascites within the abdomen/pelvis but the amount was not mentioned  Increased abdominal distention over the past few weeks     -  Reinforced compliance with a 2 g sodium and 1 5 L fluid restriction  -  Reinforce daily weights  -  Increase furosemide to 40 mg daily   - Spironolactone (ALDACTONE) 50 mg tablet; Take 1 tablet (50 mg total) by mouth daily  Dispense: 30 tablet; Refill: 5  -  Check an abdominal ultrasound and most likely will require paracentesis by Interventional Radiology    3  Thrombocytopenia (HCC)  Worsening  Platelets 168 years ago and most recent count was 77     4  Coagulopathy (HCC)    PT/INR slightly increasing over the past 2 years  Last  INR was1 89    5  Diarrhea    Exclude an infectious colitis  Hold lactulose for 24 hours      - check stool studies for enteric pathogens and C diff  - unfortunately Xifaxan is cost prohibitive    6  History of colon polyps  Up-to-date with surveillance colonoscopy  Last colonoscopy performed in October of 2018 showed 2 adenomatous colon polyps  A repeat colonoscopy advised in 5 years  7  Abdominal aortic aneurysm - treated by stent graft  Recent CT scan measured the aneurysm at 4 1cm and treated by a stent graft  Satisfactory postoperative appearance  Satisfactory postoperative appearance        8  Cardiomyopathy, hypertrophic  Follows with ATC        9  Esophageal/gastric varices noted on recent cat scan  Hx of one varix noted on upper endoscopy October of 2019      - continue Nadolol 20 mg daily  - repeat EGD 10/2020    Followup Appointment: 4 weeks  ______________________________________________________________________    Chief Complaint   Patient presents with    Medication Management     HPI:    Follow-up for recently decompensated cirrhosis on the basis of MILLER  Has been experiencing increased abdominal distention, worsening bilateral lower extremity edema and a 6 lb weight gain over the past 3 weeks  Denies confusion  Denies melena, rectal bleeding or hematemesis  Does admit to severe diarrhea that began approximately 2 weeks ago  Having up to 10 loose stools daily  Only on lactulose 30 cc daily  Denies fevers or chills  Admits to abdominal discomfort but denies actual severe abdominal pain  Denies history of recent antibiotics, travel or sick contacts      Historical Information   Past Medical History:   Diagnosis Date    [facility] ([facility]) [facility] [facility]     Cancer ([facility] )     skin    Colon polyp     Fatty liver     H/O shoulder surgery     Hard to intubate     Grade 4 view with MAC 4, suggest video laryngoscope in future    Hypertension     Liver disease     non- alcoholic    MILLER (nonalcoholic steatohepatitis)     PONV (postoperative nausea and vomiting)      Past Surgical History:   Procedure Laterality Date    BIOPSY CORE NEEDLE N/A 2018    Procedure: BIOPSY CORE NEEDLE liver;  Surgeon: Marianne Brown MD;  [facility];  Service: General    CHOLANGIOGRAM N/A 2018    Procedure: CHOLANGIOGRAM intraop; Surgeon: Marianne Brown MD;  [facility];  Service: General    CHOLECYSTECTOMY      COLON SURGERY      colonoscopy    COLONOSCOPY  10/02/2018    COLONOSCOPY  10/2018    adenoma  Five year recall   EGD  2018    HERNIA REPAIR      KNEE ARTHROSCOPY      IA EVASC RPR DPLMNT AORTO-AORTIC NDGFT N/A 2018    Procedure: REPAIR ANEURYSM ENDOVASCULAR ABDOMINAL AORTIC  (EVAR); Surgeon: Tee Mcdaniels MD;  [facility];  Service: Vascular    IA LAP,CHOLECYSTECTOMY N/A 2018    Procedure: CHOLECYSTECTOMY LAPAROSCOPIC;  Surgeon: Marianne Brown MD;  [facility];  Service: General    IA REPAIR UMBILICAL NMHJ,4+J/O,LXGFU N/A 2018    Procedure: REPAIR HERNIA UMBILICAL;  Surgeon: Marianne Brown MD;  [facility];  Service: General    ROTATOR CUFF REPAIR Right     UPPER GASTROINTESTINAL ENDOSCOPY  10/2019    one varix    A 1 year recall advised     Social History     Substance and Sexual Activity   Alcohol Use Yes    Comment: Ocassional, 1 beer a month     Social History     Substance and Sexual Activity   Drug Use No     Social History     Tobacco Use   Smoking Status Former Smoker    Packs/day: 0 25    Years: 15 00    Pack years: 3 75    Types: Cigarettes    Last attempt to quit: Jessica Long Years since quittin 7   Smokeless Tobacco Never Used     Family History   Problem Relation Age of Onset    Pancreatic cancer Mother     Diabetes Mother     Cancer Father     Diabetes Father          Current Outpatient Medications:     furosemide (LASIX) 40 mg tablet    nadolol (CORGARD) 20 mg tablet    spironolactone (ALDACTONE) 50 mg tablet    aspirin (ECOTRIN LOW STRENGTH) 81 mg EC tablet    B Complex Vitamins (VITAMIN-B COMPLEX PO)    calcium citrate-vitamin D (CITRACAL+D) 315-200 MG-UNIT per tablet    citalopram (CeleXA) 40 mg tablet    Magnesium 400 MG CAPS    Multiple Vitamin (MULTIVITAMIN) capsule    Omega-3 Fatty Acids (FISH OIL) 1,000 mg    potassium phosphate, monobasic, (K-PHOS) 500 MG tablet    sildenafil (REVATIO) 20 mg tablet    verapamil (CALAN-SR) 120 mg CR tablet    zolpidem (AMBIEN) 10 mg tablet  No Known Allergies  Reviewed medications and allergies and updated as indicated    PHYSICAL EXAM:    Blood pressure 130/80, temperature 98 2 °F (36 8 °C), height 5' 10" (1 778 m), weight 114 kg (252 lb)  Body mass index is 36 16 kg/m²  General Appearance: NAD, cooperative, alert  Eyes:  Positive scleral icterus   ENT:  Normocephalic   Neck:  Supple  Resp:  Clear to auscultation bilaterally; no rales, rhonchi or wheezing; respirations unlabored   CV:  S1 S2, Regular rate and rhythm; no murmur, rub, or gallop  GI:  Soft, non-tender, non-distended; normal bowel sounds; no masses, no organomegaly   Rectal: Deferred  Musculoskeletal: No cyanosis, clubbing or edema  Normal ROM  Skin:  mild jaundice  Psych: Normal affect, good eye contact  Neuro: No gross deficits, AAOx3, no asterixis    Lab Results:   Lab Results   Component Value Date    WBC 5 68 06/26/2018    HGB 14 0 06/26/2018    HCT 41 1 06/26/2018    MCV 96 06/26/2018    PLT 96 (L) 06/26/2018     Lab Results   Component Value Date    K 3 6 07/21/2020     07/21/2020    CO2 24 07/21/2020    BUN 10 07/21/2020    CREATININE 1 08 07/21/2020    GLUCOSE 164 (H) 03/07/2018    GLUF 95 12/18/2018    CALCIUM 7 8 (L) 07/21/2020     (H) 07/21/2020     (H) 07/21/2020    ALKPHOS 219 (H) 07/21/2020    EGFR 71 07/21/2020     No results found for: IRON, TIBC, FERRITIN  Lab Results   Component Value Date    LIPASE 227 06/04/2018       Radiology Results:   No results found

## 2020-09-14 ENCOUNTER — TRANSCRIBE ORDERS (OUTPATIENT)
Dept: ADMINISTRATIVE | Facility: HOSPITAL | Age: 65
End: 2020-09-14

## 2020-09-14 ENCOUNTER — APPOINTMENT (OUTPATIENT)
Dept: LAB | Facility: CLINIC | Age: 65
DRG: 871 | End: 2020-09-14
Payer: MEDICARE

## 2020-09-14 DIAGNOSIS — R19.7 DIARRHEA, UNSPECIFIED TYPE: ICD-10-CM

## 2020-09-14 DIAGNOSIS — R19.7 DIARRHEA, UNSPECIFIED TYPE: Primary | ICD-10-CM

## 2020-09-14 PROCEDURE — 87046 STOOL CULTR AEROBIC BACT EA: CPT

## 2020-09-14 PROCEDURE — 87493 C DIFF AMPLIFIED PROBE: CPT

## 2020-09-14 PROCEDURE — 87427 SHIGA-LIKE TOXIN AG IA: CPT

## 2020-09-14 PROCEDURE — 87045 FECES CULTURE AEROBIC BACT: CPT | Performed by: NURSE PRACTITIONER

## 2020-09-15 DIAGNOSIS — R19.7 DIARRHEA, UNSPECIFIED TYPE: Primary | ICD-10-CM

## 2020-09-15 LAB — C DIFF TOX GENS STL QL NAA+PROBE: NEGATIVE

## 2020-09-16 ENCOUNTER — TELEPHONE (OUTPATIENT)
Dept: GASTROENTEROLOGY | Facility: CLINIC | Age: 65
End: 2020-09-16

## 2020-09-16 DIAGNOSIS — R19.7 DIARRHEA, UNSPECIFIED TYPE: Primary | ICD-10-CM

## 2020-09-16 NOTE — TELEPHONE ENCOUNTER
Spoke with patient  There are 2 stool studies still pending  Told him we would check again tomorrow

## 2020-09-17 ENCOUNTER — APPOINTMENT (EMERGENCY)
Dept: CT IMAGING | Facility: HOSPITAL | Age: 65
DRG: 871 | End: 2020-09-17
Payer: MEDICARE

## 2020-09-17 ENCOUNTER — HOSPITAL ENCOUNTER (INPATIENT)
Facility: HOSPITAL | Age: 65
LOS: 7 days | Discharge: ANOTHER HOSPITAL | DRG: 871 | End: 2020-09-24
Attending: EMERGENCY MEDICINE | Admitting: INTERNAL MEDICINE
Payer: MEDICARE

## 2020-09-17 ENCOUNTER — APPOINTMENT (EMERGENCY)
Dept: RADIOLOGY | Facility: HOSPITAL | Age: 65
DRG: 871 | End: 2020-09-17
Payer: MEDICARE

## 2020-09-17 DIAGNOSIS — N17.9 AKI (ACUTE KIDNEY INJURY) (HCC): ICD-10-CM

## 2020-09-17 DIAGNOSIS — R53.1 GENERALIZED WEAKNESS: ICD-10-CM

## 2020-09-17 DIAGNOSIS — N39.0 UTI (URINARY TRACT INFECTION): ICD-10-CM

## 2020-09-17 DIAGNOSIS — K75.81 NASH (NONALCOHOLIC STEATOHEPATITIS): ICD-10-CM

## 2020-09-17 DIAGNOSIS — K75.81 NASH (NONALCOHOLIC STEATOHEPATITIS): Chronic | ICD-10-CM

## 2020-09-17 DIAGNOSIS — R18.8 ASCITES: ICD-10-CM
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
## 2020-09-17 DIAGNOSIS — A41.9 SEVERE SEPSIS (HCC): Primary | ICD-10-CM

## 2020-09-17 DIAGNOSIS — R57.9 SHOCK (HCC): ICD-10-CM

## 2020-09-17 DIAGNOSIS — R65.20 SEVERE SEPSIS (HCC): Primary | ICD-10-CM

## 2020-09-17 PROBLEM — R74.8 ELEVATED CK: Status: ACTIVE | Noted: 2020-09-17

## 2020-09-17 PROBLEM — E80.6 HYPERBILIRUBINEMIA: Chronic | Status: ACTIVE | Noted: 2020-09-17

## 2020-09-17 PROBLEM — R77.8 ELEVATED TROPONIN LEVEL NOT DUE MYOCARDIAL INFARCTION: Status: ACTIVE | Noted: 2020-09-17

## 2020-09-17 PROBLEM — I42.2 NON-OBSTRUCTIVE HYPERTROPHIC CARDIOMYOPATHY (HCC): Status: ACTIVE | Noted: 2020-09-17

## 2020-09-17 PROBLEM — I71.4 ABDOMINAL AORTIC ANEURYSM (AAA) WITHOUT RUPTURE (HCC): Chronic | Status: ACTIVE | Noted: 2020-09-17

## 2020-09-17 PROBLEM — D69.6 THROMBOCYTOPENIA (HCC): Status: ACTIVE | Noted: 2020-09-17

## 2020-09-17 PROBLEM — Z91.89 HISTORY OF DIFFICULT INTUBATION: Chronic | Status: ACTIVE | Noted: 2020-09-17

## 2020-09-17 PROBLEM — R58 ECCHYMOSIS: Status: ACTIVE | Noted: 2020-09-17

## 2020-09-17 PROBLEM — E87.2 LACTIC ACIDEMIA: Status: ACTIVE | Noted: 2020-09-17

## 2020-09-17 PROBLEM — D68.9 COAGULOPATHY (HCC): Status: ACTIVE | Noted: 2020-09-17

## 2020-09-17 LAB
ABO GROUP BLD: NORMAL
ALBUMIN SERPL BCP-MCNC: 1.4 G/DL (ref 3.5–5)
ALP SERPL-CCNC: 144 U/L (ref 46–116)
ALT SERPL W P-5'-P-CCNC: 99 U/L (ref 12–78)
AMMONIA PLAS-SCNC: 18 UMOL/L (ref 11–35)
ANION GAP SERPL CALCULATED.3IONS-SCNC: 9 MMOL/L (ref 4–13)
ANISOCYTOSIS BLD QL SMEAR: PRESENT
APTT PPP: 75 SECONDS (ref 23–37)
AST SERPL W P-5'-P-CCNC: 238 U/L (ref 5–45)
BACTERIA UR QL AUTO: ABNORMAL /HPF
BASOPHILS # BLD MANUAL: 0 THOUSAND/UL (ref 0–0.1)
BASOPHILS NFR MAR MANUAL: 0 % (ref 0–1)
BILIRUB SERPL-MCNC: 9.5 MG/DL (ref 0.2–1)
BILIRUB UR QL STRIP: ABNORMAL
BLD GP AB SCN SERPL QL: NEGATIVE
BUN SERPL-MCNC: 36 MG/DL (ref 5–25)
CALCIUM ALBUM COR SERPL-MCNC: 10.2 MG/DL (ref 8.3–10.1)
CALCIUM SERPL-MCNC: 8.1 MG/DL (ref 8.3–10.1)
CHLORIDE SERPL-SCNC: 94 MMOL/L (ref 100–108)
CK MB SERPL-MCNC: 3.8 NG/ML (ref 0–5)
CK MB SERPL-MCNC: 4.7 NG/ML (ref 0–5)
CK MB SERPL-MCNC: <1 % (ref 0–2.5)
CK MB SERPL-MCNC: <1 % (ref 0–2.5)
CK SERPL-CCNC: 477 U/L (ref 39–308)
CK SERPL-CCNC: 599 U/L (ref 39–308)
CLARITY UR: CLEAR
CO2 SERPL-SCNC: 21 MMOL/L (ref 21–32)
COLOR UR: ABNORMAL
CREAT SERPL-MCNC: 1.97 MG/DL (ref 0.6–1.3)
EOSINOPHIL # BLD MANUAL: 0.15 THOUSAND/UL (ref 0–0.4)
EOSINOPHIL NFR BLD MANUAL: 1 % (ref 0–6)
ERYTHROCYTE [DISTWIDTH] IN BLOOD BY AUTOMATED COUNT: 17.2 % (ref 11.6–15.1)
GFR SERPL CREATININE-BSD FRML MDRD: 34 ML/MIN/1.73SQ M
GLUCOSE SERPL-MCNC: 75 MG/DL (ref 65–140)
GLUCOSE UR STRIP-MCNC: ABNORMAL MG/DL
HCT VFR BLD AUTO: 34.9 % (ref 36.5–49.3)
HGB BLD-MCNC: 12.1 G/DL (ref 12–17)
HGB UR QL STRIP.AUTO: ABNORMAL
INR PPP: 3.44 (ref 0.84–1.19)
KETONES UR STRIP-MCNC: ABNORMAL MG/DL
LACTATE SERPL-SCNC: 5.1 MMOL/L (ref 0.5–2)
LACTATE SERPL-SCNC: 5.8 MMOL/L (ref 0.5–2)
LEUKOCYTE ESTERASE UR QL STRIP: NEGATIVE
LYMPHOCYTES # BLD AUTO: 1.7 THOUSAND/UL (ref 0.6–4.47)
LYMPHOCYTES # BLD AUTO: 11 % (ref 14–44)
MACROCYTES BLD QL AUTO: PRESENT
MAGNESIUM SERPL-MCNC: 2.1 MG/DL (ref 1.6–2.6)
MCH RBC QN AUTO: 36.8 PG (ref 26.8–34.3)
MCHC RBC AUTO-ENTMCNC: 34.7 G/DL (ref 31.4–37.4)
MCV RBC AUTO: 106 FL (ref 82–98)
MONOCYTES # BLD AUTO: 0.62 THOUSAND/UL (ref 0–1.22)
MONOCYTES NFR BLD: 4 % (ref 4–12)
NEUTROPHILS # BLD MANUAL: 13 THOUSAND/UL (ref 1.85–7.62)
NEUTS BAND NFR BLD MANUAL: 5 % (ref 0–8)
NEUTS SEG NFR BLD AUTO: 79 % (ref 43–75)
NITRITE UR QL STRIP: POSITIVE
NON-SQ EPI CELLS URNS QL MICRO: ABNORMAL /HPF
NRBC BLD AUTO-RTO: 0 /100 WBCS
PH UR STRIP.AUTO: 5 [PH]
PLATELET # BLD AUTO: 63 THOUSANDS/UL (ref 149–390)
PLATELET # BLD AUTO: 82 THOUSANDS/UL (ref 149–390)
PLATELET BLD QL SMEAR: ABNORMAL
PMV BLD AUTO: 9.2 FL (ref 8.9–12.7)
PMV BLD AUTO: 9.3 FL (ref 8.9–12.7)
POLYCHROMASIA BLD QL SMEAR: PRESENT
POTASSIUM SERPL-SCNC: 5 MMOL/L (ref 3.5–5.3)
PROT SERPL-MCNC: 6.7 G/DL (ref 6.4–8.2)
PROT UR STRIP-MCNC: ABNORMAL MG/DL
PROTHROMBIN TIME: 34.4 SECONDS (ref 11.6–14.5)
RBC # BLD AUTO: 3.29 MILLION/UL (ref 3.88–5.62)
RBC #/AREA URNS AUTO: ABNORMAL /HPF
RBC MORPH BLD: PRESENT
RH BLD: NEGATIVE
SARS-COV-2 RNA RESP QL NAA+PROBE: NEGATIVE
SODIUM SERPL-SCNC: 124 MMOL/L (ref 136–145)
SP GR UR STRIP.AUTO: 1.02 (ref 1–1.03)
SPECIMEN EXPIRATION DATE: NORMAL
TOTAL CELLS COUNTED SPEC: 100
TROPONIN I SERPL-MCNC: 0.29 NG/ML
TROPONIN I SERPL-MCNC: 0.31 NG/ML
TROPONIN I SERPL-MCNC: 0.38 NG/ML
UROBILINOGEN UR QL STRIP.AUTO: 2 E.U./DL
WBC # BLD AUTO: 15.48 THOUSAND/UL (ref 4.31–10.16)
WBC #/AREA URNS AUTO: ABNORMAL /HPF

## 2020-09-17 PROCEDURE — 84484 ASSAY OF TROPONIN QUANT: CPT | Performed by: PHYSICIAN ASSISTANT

## 2020-09-17 PROCEDURE — 1123F ACP DISCUSS/DSCN MKR DOCD: CPT | Performed by: EMERGENCY MEDICINE

## 2020-09-17 PROCEDURE — 93005 ELECTROCARDIOGRAM TRACING: CPT

## 2020-09-17 PROCEDURE — 74177 CT ABD & PELVIS W/CONTRAST: CPT

## 2020-09-17 PROCEDURE — 82553 CREATINE MB FRACTION: CPT | Performed by: EMERGENCY MEDICINE

## 2020-09-17 PROCEDURE — 85610 PROTHROMBIN TIME: CPT | Performed by: EMERGENCY MEDICINE

## 2020-09-17 PROCEDURE — 87635 SARS-COV-2 COVID-19 AMP PRB: CPT | Performed by: EMERGENCY MEDICINE

## 2020-09-17 PROCEDURE — 84484 ASSAY OF TROPONIN QUANT: CPT | Performed by: EMERGENCY MEDICINE

## 2020-09-17 PROCEDURE — 30233L1 TRANSFUSION OF NONAUTOLOGOUS FRESH PLASMA INTO PERIPHERAL VEIN, PERCUTANEOUS APPROACH: ICD-10-PCS | Performed by: INTERNAL MEDICINE

## 2020-09-17 PROCEDURE — 71260 CT THORAX DX C+: CPT

## 2020-09-17 PROCEDURE — 82550 ASSAY OF CK (CPK): CPT | Performed by: EMERGENCY MEDICINE

## 2020-09-17 PROCEDURE — 72125 CT NECK SPINE W/O DYE: CPT

## 2020-09-17 PROCEDURE — 86900 BLOOD TYPING SEROLOGIC ABO: CPT | Performed by: PHYSICIAN ASSISTANT

## 2020-09-17 PROCEDURE — 80053 COMPREHEN METABOLIC PANEL: CPT | Performed by: EMERGENCY MEDICINE

## 2020-09-17 PROCEDURE — 71045 X-RAY EXAM CHEST 1 VIEW: CPT

## 2020-09-17 PROCEDURE — 82550 ASSAY OF CK (CPK): CPT | Performed by: PHYSICIAN ASSISTANT

## 2020-09-17 PROCEDURE — 82553 CREATINE MB FRACTION: CPT | Performed by: PHYSICIAN ASSISTANT

## 2020-09-17 PROCEDURE — 70450 CT HEAD/BRAIN W/O DYE: CPT

## 2020-09-17 PROCEDURE — 82140 ASSAY OF AMMONIA: CPT | Performed by: EMERGENCY MEDICINE

## 2020-09-17 PROCEDURE — P9017 PLASMA 1 DONOR FRZ W/IN 8 HR: HCPCS

## 2020-09-17 PROCEDURE — 99291 CRITICAL CARE FIRST HOUR: CPT | Performed by: EMERGENCY MEDICINE

## 2020-09-17 PROCEDURE — 96365 THER/PROPH/DIAG IV INF INIT: CPT

## 2020-09-17 PROCEDURE — 87086 URINE CULTURE/COLONY COUNT: CPT | Performed by: PHYSICIAN ASSISTANT

## 2020-09-17 PROCEDURE — 36415 COLL VENOUS BLD VENIPUNCTURE: CPT | Performed by: EMERGENCY MEDICINE

## 2020-09-17 PROCEDURE — 87040 BLOOD CULTURE FOR BACTERIA: CPT | Performed by: EMERGENCY MEDICINE

## 2020-09-17 PROCEDURE — 86901 BLOOD TYPING SEROLOGIC RH(D): CPT | Performed by: PHYSICIAN ASSISTANT

## 2020-09-17 PROCEDURE — 99223 1ST HOSP IP/OBS HIGH 75: CPT | Performed by: PHYSICIAN ASSISTANT

## 2020-09-17 PROCEDURE — G1004 CDSM NDSC: HCPCS

## 2020-09-17 PROCEDURE — 83735 ASSAY OF MAGNESIUM: CPT | Performed by: EMERGENCY MEDICINE

## 2020-09-17 PROCEDURE — 73706 CT ANGIO LWR EXTR W/O&W/DYE: CPT

## 2020-09-17 PROCEDURE — 85027 COMPLETE CBC AUTOMATED: CPT | Performed by: EMERGENCY MEDICINE

## 2020-09-17 PROCEDURE — 81001 URINALYSIS AUTO W/SCOPE: CPT | Performed by: EMERGENCY MEDICINE

## 2020-09-17 PROCEDURE — 99285 EMERGENCY DEPT VISIT HI MDM: CPT

## 2020-09-17 PROCEDURE — 83605 ASSAY OF LACTIC ACID: CPT | Performed by: EMERGENCY MEDICINE

## 2020-09-17 PROCEDURE — 87147 CULTURE TYPE IMMUNOLOGIC: CPT | Performed by: EMERGENCY MEDICINE

## 2020-09-17 PROCEDURE — 86850 RBC ANTIBODY SCREEN: CPT | Performed by: PHYSICIAN ASSISTANT

## 2020-09-17 PROCEDURE — 96361 HYDRATE IV INFUSION ADD-ON: CPT

## 2020-09-17 PROCEDURE — 85007 BL SMEAR W/DIFF WBC COUNT: CPT | Performed by: EMERGENCY MEDICINE

## 2020-09-17 PROCEDURE — 85049 AUTOMATED PLATELET COUNT: CPT | Performed by: PHYSICIAN ASSISTANT

## 2020-09-17 PROCEDURE — 85730 THROMBOPLASTIN TIME PARTIAL: CPT | Performed by: EMERGENCY MEDICINE

## 2020-09-17 RX ORDER — LACTULOSE 10 G/15ML
20 SOLUTION ORAL 2 TIMES DAILY
COMMUNITY

## 2020-09-17 RX ORDER — CHLORHEXIDINE GLUCONATE 0.12 MG/ML
15 RINSE ORAL EVERY 12 HOURS SCHEDULED
Status: DISCONTINUED | OUTPATIENT
Start: 2020-09-17 | End: 2020-09-18

## 2020-09-17 RX ORDER — HEPARIN SODIUM 5000 [USP'U]/ML
5000 INJECTION, SOLUTION INTRAVENOUS; SUBCUTANEOUS EVERY 8 HOURS SCHEDULED
Status: DISCONTINUED | OUTPATIENT
Start: 2020-09-17 | End: 2020-09-18

## 2020-09-17 RX ORDER — ALBUTEROL SULFATE 2.5 MG/3ML
2.5 SOLUTION RESPIRATORY (INHALATION) EVERY 4 HOURS PRN
Status: DISCONTINUED | OUTPATIENT
Start: 2020-09-17 | End: 2020-09-24 | Stop reason: HOSPADM

## 2020-09-17 RX ORDER — FUROSEMIDE 20 MG/1
20 TABLET ORAL DAILY
COMMUNITY

## 2020-09-17 RX ORDER — NADOLOL 20 MG/1
20 TABLET ORAL DAILY
COMMUNITY

## 2020-09-17 RX ORDER — SPIRONOLACTONE 50 MG/1
50 TABLET, FILM COATED ORAL DAILY
COMMUNITY

## 2020-09-17 RX ADMIN — PIPERACILLIN AND TAZOBACTAM 3.38 G: 3; .375 INJECTION, POWDER, LYOPHILIZED, FOR SOLUTION INTRAVENOUS at 21:05

## 2020-09-17 RX ADMIN — PIPERACILLIN AND TAZOBACTAM 3.38 G: 3; .375 INJECTION, POWDER, LYOPHILIZED, FOR SOLUTION INTRAVENOUS at 15:43

## 2020-09-17 RX ADMIN — CALCIUM GLUCONATE 2 G: 20 INJECTION, SOLUTION INTRAVENOUS at 19:38

## 2020-09-17 RX ADMIN — HEPARIN SODIUM 5000 UNITS: 5000 INJECTION INTRAVENOUS; SUBCUTANEOUS at 22:06

## 2020-09-17 RX ADMIN — SODIUM CHLORIDE 1000 ML: 0.9 INJECTION, SOLUTION INTRAVENOUS at 15:03

## 2020-09-17 RX ADMIN — IOHEXOL 100 ML: 350 INJECTION, SOLUTION INTRAVENOUS at 14:43

## 2020-09-17 RX ADMIN — SODIUM CHLORIDE 500 ML: 0.9 INJECTION, SOLUTION INTRAVENOUS at 15:03

## 2020-09-17 RX ADMIN — CHLORHEXIDINE GLUCONATE 0.12% ORAL RINSE 15 ML: 1.2 LIQUID ORAL at 21:05

## 2020-09-17 NOTE — TELEPHONE ENCOUNTER
Patient's sister, Jessica Maher, asks to héctor love/ Payton Wiggins  She states he is in a lot of pain/writhes back & forth, doesn't eat/can't sleep; lives alone  [facility] doesn't think he is honest about the pain he is experiencing/asks for advice on how to help him   # [phone] (sister is listed OK to héctor w/)

## 2020-09-17 NOTE — ASSESSMENT & PLAN NOTE
Likely 2/2 to UTI vs PNA vs SBP  Pt with tachycardia and leukocytosis on presnentation - Lactic acid 5 6 - pt received 30 ml/kg of IVF in ED  Urine consistent with UTI  PT with allergy to cephalosporins - Day 1 Zosyn  Blood and urine cultures pending  IR consulted for diagnostic paracentesis  Follow procalcitonin  De-escalate abx as able

## 2020-09-17 NOTE — TELEPHONE ENCOUNTER
Pt's sister called/had missed call; will ck mssgs as no mssg yet  She is arriving at hospital now; asks for CB from [facility] when able

## 2020-09-17 NOTE — ASSESSMENT & PLAN NOTE
Baseline creatinine 1 08  Creatinine 1 97 at this time  Likely 2/2 ATN vs possible hepatorenal failure  Pt received adequate fluid resuscitation in ED  Monitor UOP and BMP

## 2020-09-17 NOTE — RESPIRATORY THERAPY NOTE
RT Protocol Note  Gill Alvarez 76 y o  male MRN: [id number]  Unit/Bed#: -01 Encounter: [id number]    Assessment    Principal Problem:    Severe sepsis ([facility] [address] )  Active Problems:    MILLRE (nonalcoholic steatohepatitis)    Accidental fall    Ascites    Generalized weakness    UTI (urinary tract infection)    SAMANTHA (acute kidney injury) ([facility] )    Abdominal aortic aneurysm (AAA) without rupture (HCC)    Non-obstructive hypertrophic cardiomyopathy (HCC)    Hyperbilirubinemia    Coagulopathy (HCC)    Elevated troponin level not due myocardial infarction    Lactic acidemia    Thrombocytopenia (HCC)    History of difficult intubation    Ecchymosis    Elevated CK      Home Pulmonary Medications:None       Past Medical History:   Diagnosis Date    Hypertension     Liver disease      Social History     Socioeconomic History    Marital status:      Spouse name: None    Number of children: None    Years of education: None    Highest education level: None   Occupational History    None   Social Needs    Financial resource strain: None    Food insecurity     Worry: None     Inability: None    Transportation needs     Medical: None     Non-medical: None   Tobacco Use    Smoking status: Never Smoker    Smokeless tobacco: Never Used   Substance and Sexual Activity    Alcohol use: Never     Frequency: Never     Drinks per session: Patient refused     Binge frequency: Never    Drug use: Never    Sexual activity: None   Lifestyle    Physical activity     Days per week: None     Minutes per session: None    Stress: None   Relationships    Social connections     Talks on phone: None     Gets together: None     Attends Zoroastrian service: None     Active member of club or organization: None     Attends meetings of clubs or organizations: None     Relationship status: None    Intimate partner violence     Fear of current or ex partner: None     Emotionally abused: None     Physically abused: None     Forced sexual activity: None   Other Topics Concern    None   Social History Narrative    None       Subjective         Objective    Physical Exam:   Assessment Type: Assess only  General Appearance: (P) Alert, Awake  Respiratory Pattern: (P) Dyspnea with exertion  Chest Assessment: (P) Chest expansion symmetrical, Trachea midline  Bilateral Breath Sounds: (P) Clear, Diminished  Cough: (P) None    Vitals:  Blood pressure 102/57, pulse 91, temperature 97 5 °F (36 4 °C), temperature source Axillary, resp  rate (!) (P) 24, height 5' 10" (1 778 m), weight 120 kg (264 lb 5 3 oz), SpO2 (P) 93 %  Imaging and other studies: I have personally reviewed pertinent reports              Plan    Respiratory Plan: (P) No distress/Pulmonary history

## 2020-09-17 NOTE — ASSESSMENT & PLAN NOTE
Likely 2/2 to severe sepsis and prolonged period of laying on the floor  Pt will be unlikely to clear lactate normally 2/2 advanced liver disease  Follow lactate

## 2020-09-17 NOTE — TELEPHONE ENCOUNTER
Pt's sister called again to reporting person who checks on him informed her that he was on the floor all night and is swollen  They are calling 911 to take him to [address] [place] [address] in [facility]  Sister would still like to héctor love/ Melissa Sahni if possible

## 2020-09-17 NOTE — ASSESSMENT & PLAN NOTE
Pt with mitral murmur on exam  Previous Echo with severe non-obstructive hypertrophic cardiomyopathy - EF 70%  Repeat echo  Hold corgard - avoid tachycardia

## 2020-09-17 NOTE — ASSESSMENT & PLAN NOTE
Pt sees Dr Sally Shay of [facility] as outpatient  PT is scheduled for transplant evaluation with Dr Yossi Villanueva on 9/21  MELD 35 - consistent with 52% 3 month mortality  Treat sepsis - consider GI consult for possible transfer for transplant

## 2020-09-17 NOTE — ED PROVIDER NOTES
History  Chief Complaint   Patient presents with    Weakness - Generalized     patient complaint of "loosing balance and guided self to the floor,landed on buttocks, denies loc, denies head injury, increasing weakness last month     57-year-old male with past history of MILLER with recent decompensation with encephalopathy, transaminitis, coagulopathy, le edema, jaundice, ascites and thrombocytopenia - MELD 21 9/10/2020, hypertension, presents to the ED for evaluation of generalized weakness over the past few days  Patient states that he felt very weak last night and fell out of bed onto the floor  Patient states he did not lose consciousness  Patient was too weak to get up and laid on the floor all night until he was found by his friend this morning  Friend subsequently called EMS for further evaluation  Patient's sister at bedside states that patient appears to be more jaundiced than usual   Patient is alert and oriented x3 in the ED and complains of left lower extremity pain  History provided by:  Patient and relative      Prior to Admission Medications   Prescriptions Last Dose Informant Patient Reported? Taking?   furosemide (LASIX) 20 mg tablet Unknown at Unknown time  Yes No   Sig: Take 20 mg by mouth daily   lactulose (CHRONULAC) 10 g/15 mL solution Unknown at Unknown time  Yes No   Sig: Take 20 g by mouth 2 (two) times a day   nadolol (CORGARD) 20 mg tablet Unknown at Unknown time  Yes No   Sig: Take 20 mg by mouth daily   spironolactone (ALDACTONE) 50 mg tablet Unknown at Unknown time  Yes No   Sig: Take 50 mg by mouth daily      Facility-Administered Medications: None       Past Medical History:   Diagnosis Date    Hypertension     Liver disease        History reviewed  No pertinent surgical history  History reviewed  No pertinent family history  I have reviewed and agree with the history as documented      E-Cigarette/Vaping    E-Cigarette Use Never User      E-Cigarette/Vaping Substances Social History     Tobacco Use    Smoking status: Never Smoker    Smokeless tobacco: Never Used   Substance Use Topics    Alcohol use: Never     Frequency: Never     Drinks per session: Patient refused     Binge frequency: Never    Drug use: Never       Review of Systems   Constitutional: Negative for activity change, fatigue and fever  HENT: Negative for congestion, ear discharge and sore throat  Eyes: Negative for pain and redness  Respiratory: Negative for cough, chest tightness, shortness of breath and wheezing  Cardiovascular: Negative for chest pain  Gastrointestinal: Negative for abdominal pain, diarrhea, nausea and vomiting  Endocrine: Negative for cold intolerance  Genitourinary: Negative for dysuria and urgency  Musculoskeletal: Positive for arthralgias and myalgias  Negative for back pain  Neurological: Positive for weakness  Negative for dizziness and headaches  Psychiatric/Behavioral: Negative for agitation and behavioral problems  Physical Exam  Physical Exam  Vitals signs and nursing note reviewed  Constitutional:       Appearance: He is well-developed  HENT:      Head: Normocephalic and atraumatic  Comments: Superficial abrasion noted to the occipital region  Nose: Nose normal    Eyes:      Conjunctiva/sclera: Conjunctivae normal    Neck:      Musculoskeletal: Normal range of motion and neck supple  Cardiovascular:      Rate and Rhythm: Normal rate and regular rhythm  Heart sounds: Normal heart sounds  Pulmonary:      Effort: Pulmonary effort is normal       Breath sounds: Normal breath sounds  Abdominal:      General: Bowel sounds are normal  There is no distension  Palpations: Abdomen is soft  Tenderness: There is no abdominal tenderness  Musculoskeletal: Normal range of motion  Comments: Tenderness to palpation noted to room left pelvis region  Pulses intact bilateral lower extremities    2+ pitting edema noted to bilateral lower extremities  Extensive ecchymosis and tenderness to palpation noted along left thigh and leg region  Skin:     General: Skin is warm  Neurological:      Mental Status: He is alert and oriented to person, place, and time  Psychiatric:         Behavior: Behavior normal          Thought Content:  Thought content normal          Judgment: Judgment normal          Vital Signs  ED Triage Vitals   Temperature Pulse Respirations Blood Pressure SpO2   09/17/20 1311 09/17/20 1311 09/17/20 1311 09/17/20 1311 09/17/20 1311   98 °F (36 7 °C) 96 18 131/58 90 %      Temp Source Heart Rate Source Patient Position - Orthostatic VS BP Location FiO2 (%)   09/17/20 1751 09/17/20 1400 09/17/20 1332 09/17/20 1400 --   Axillary Monitor Lying Right arm       Pain Score       09/17/20 1751       No Pain           Vitals:    09/17/20 1700 09/17/20 1715 09/17/20 1751 09/17/20 1800   BP:  (!) 84/51 153/88 94/51   Pulse: 96 97 94 94   Patient Position - Orthostatic VS:   Lying          Visual Acuity  Visual Acuity      Most Recent Value   L Pupil Size (mm)  3   R Pupil Size (mm)  3   L Pupil Shape  Round   R Pupil Shape  Round          ED Medications  Medications   chlorhexidine (PERIDEX) 0 12 % oral rinse 15 mL (has no administration in time range)   heparin (porcine) subcutaneous injection 5,000 Units (has no administration in time range)   piperacillin-tazobactam (ZOSYN) 3 375 g in sodium chloride 0 9 % 100 mL IVPB (has no administration in time range)   calcium gluconate 2 g in sodium chloride 0 9 % 50 mL IVPB (has no administration in time range)   piperacillin-tazobactam (ZOSYN) 3 375 g in sodium chloride 0 9 % 100 mL IVPB (0 g Intravenous Stopped 9/17/20 1613)   sodium chloride 0 9 % bolus 1,000 mL (0 mL Intravenous Stopped 9/17/20 1526)   iohexol (OMNIPAQUE) 350 MG/ML injection (MULTI-DOSE) 120 mL (100 mL Intravenous Given 9/17/20 1443)   sodium chloride 0 9 % bolus 1,000 mL (0 mL Intravenous Stopped 9/17/20 1720)   sodium chloride 0 9 % bolus 500 mL (0 mL Intravenous Stopped 9/17/20 1526)       Diagnostic Studies  Results Reviewed     Procedure Component Value Units Date/Time    Blood culture #1 [[id number]] Collected:  09/17/20 1406    Lab Status:  Preliminary result Specimen:  Blood from Arm, Left Updated:  09/17/20 1801     Blood Culture Received in Microbiology Lab  Culture in Progress  Blood culture #2 [[id number]] Collected:  09/17/20 1335    Lab Status:  Preliminary result Specimen:  Blood from Arm, Right Updated:  09/17/20 1801     Blood Culture Received in Microbiology Lab  Culture in Progress  Urine Microscopic [[id number]]  (Abnormal) Collected:  09/17/20 1537    Lab Status:  Final result Specimen:  Urine, Clean Catch Updated:  09/17/20 1635     RBC, UA 30-50 /hpf      WBC, UA None Seen /hpf      Epithelial Cells None Seen /hpf      Bacteria, UA None Seen /hpf     Lactic acid 2 Hours [[id number]]  (Abnormal) Collected:  09/17/20 1536    Lab Status:  Final result Specimen:  Blood from Arm, Left Updated:  09/17/20 1631     LACTIC ACID 5 1 mmol/L     Narrative:       Result may be elevated if tourniquet was used during collection  Novel Coronavirus [facility] [[id number]]  (Normal) Collected:  09/17/20 1458    Lab Status:  Final result Specimen:  Nares from Nose Updated:  09/17/20 1601     SARS-CoV-2 Negative    Narrative: The specimen collection materials, transport medium, and/or testing methodology utilized in the production of these test results have been proven to be reliable in a limited validation with an abbreviated program under the Emergency Utilization Authorization provided by the FDA  Testing reported as "Presumptive positive" will be confirmed with secondary testing with a reference laboratory to ensure result accuracy    Clinical caution and judgement should be used with the interpretation of these results with consideration of the clinical impression and other laboratory testing  Testing reported as "Positive" or "Negative" has been proven to be accurate according to standard laboratory validation requirements  All testing is performed with control materials showing appropriate reactivity at standard intervals        UA w Reflex to Microscopic w Reflex to Culture [[id number]]  (Abnormal) Collected:  09/17/20 1537    Lab Status:  Final result Specimen:  Urine, Clean Catch Updated:  09/17/20 1600     Color, UA Dark Chana     Clarity, UA Clear     Specific Gravity, UA 1 025     pH, UA 5 0     Leukocytes, UA Negative     Nitrite, UA Positive     Protein, UA Trace mg/dl      Glucose,  (1/10%) mg/dl      Ketones, UA Trace mg/dl      Urobilinogen, UA 2 0 E U /dl      Bilirubin, UA Interference- unable to analyze     Blood, UA Small    Comprehensive metabolic panel [[id number]]  (Abnormal) Collected:  09/17/20 1320    Lab Status:  Final result Specimen:  Blood from Arm, Right Updated:  09/17/20 1430     Sodium 124 mmol/L      Potassium 5 0 mmol/L      Chloride 94 mmol/L      CO2 21 mmol/L      ANION GAP 9 mmol/L      BUN 36 mg/dL      Creatinine 1 97 mg/dL      Glucose 75 mg/dL      Calcium 8 1 mg/dL      Corrected Calcium 10 2 mg/dL       U/L      ALT 99 U/L      Alkaline Phosphatase 144 U/L      Total Protein 6 7 g/dL      Albumin 1 4 g/dL      Total Bilirubin 9 50 mg/dL      eGFR 34 ml/min/1 73sq m     Narrative:       [place] guidelines for Chronic Kidney Disease (CKD):     Stage 1 with normal or high GFR (GFR > 90 mL/min/1 73 square meters)    Stage 2 Mild CKD (GFR = 60-89 mL/min/1 73 square meters)    Stage 3A Moderate CKD (GFR = 45-59 mL/min/1 73 square meters)    Stage 3B Moderate CKD (GFR = 30-44 mL/min/1 73 square meters)    Stage 4 Severe CKD (GFR = 15-29 mL/min/1 73 square meters)    Stage 5 End Stage CKD (GFR <15 mL/min/1 73 square meters)  Note: GFR calculation is accurate only with a steady state creatinine    Magnesium [[id number]] (Normal) Collected:  09/17/20 1320    Lab Status:  Final result Specimen:  Blood from Arm, Right Updated:  09/17/20 1429     Magnesium 2 1 mg/dL     CKMB [[id number]]  (Normal) Collected:  09/17/20 1320    Lab Status:  Final result Specimen:  Blood from Arm, Right Updated:  09/17/20 1429     CK-MB Index <1 0 %      CK-MB 4 7 ng/mL     CK Total with Reflex CKMB [[id number]]  (Abnormal) Collected:  09/17/20 1320    Lab Status:  Final result Specimen:  Blood from Arm, Right Updated:  09/17/20 1429     Total  U/L     CBC and differential [[id number]]  (Abnormal) Collected:  09/17/20 1320    Lab Status:  Final result Specimen:  Blood from Arm, Right Updated:  09/17/20 1410     WBC 15 48 Thousand/uL      RBC 3 29 Million/uL      Hemoglobin 12 1 g/dL      Hematocrit 34 9 %       fL      MCH 36 8 pg      MCHC 34 7 g/dL      RDW 17 2 %      MPV 9 3 fL      Platelets 82 Thousands/uL      nRBC 0 /100 WBCs     Narrative:        No Clots    Troponin I [[id number]]  (Abnormal) Collected:  09/17/20 1320    Lab Status:  Final result Specimen:  Blood from Arm, Right Updated:  09/17/20 1407     Troponin I 0 29 ng/mL     Lactic acid [[id number]]  (Abnormal) Collected:  09/17/20 1335    Lab Status:  Final result Specimen:  Blood from Arm, Right Updated:  09/17/20 1407     LACTIC ACID 5 8 mmol/L     Narrative:       Result may be elevated if tourniquet was used during collection      Protime-INR [[id number]]  (Abnormal) Collected:  09/17/20 1320    Lab Status:  Final result Specimen:  Blood from Arm, Right Updated:  09/17/20 1404     Protime 34 4 seconds      INR 3 44    APTT [[id number]]  (Abnormal) Collected:  09/17/20 1320    Lab Status:  Final result Specimen:  Blood from Arm, Right Updated:  09/17/20 1404     PTT 75 seconds     Ammonia [[id number]]  (Normal) Collected:  09/17/20 1335    Lab Status:  Final result Specimen:  Blood from Arm, Right Updated:  09/17/20 1359     Ammonia 18 umol/L                  TRAUMA - CT head wo contrast   Final Result by Kelsey Lopez MD (09/17 4350)         1  Tiny 3 mm focus of high attenuation in the medial anterior left frontal lobe possibly a small subarachnoid hemorrhage  No abnormal mass effect  2   No other acute intracranial abnormality noted  I personally discussed this study with Yobani Yin on 9/17/2020 at 3:30 PM                Workstation performed: [id number]         TRAUMA - CT spine cervical wo contrast   Final Result by Kelsey Lopez MD (09/17 2872)      No cervical spine fracture or traumatic malalignment  I personally discussed this study with Yobani Yin on 9/17/2020 at 3:30 PM                       Workstation performed: [id number]         TRAUMA - CT chest abdomen pelvis w contrast   Final Result by Kelsey Lopez MD (09/17 8617)         1  Questionable minimally displaced fracture the anterior left 9th rib  2   No other definite acute abnormality noted in the chest, abdomen or pelvis  3   Stranding in subcutaneous soft tissues of the left lateral lower thoracic and left abdominal wall likely related to the patient's trauma and/or prolong lying on the floor in the left lateral decubitus position  No hematoma noted  4  Cardiomegaly with scattered groundglass opacities in both lungs likely related to edema although infectious/inflammatory process, including Covid not entirely excludable  5   Small right greater than left pleural effusions which may be due to volume overload and/or 3rd spacing in this patient with cirrhosis  6   Cirrhotic changes in the liver with evidence for portal hypertension manifest as large gastric moderate sized esophageal varices as well as moderate amount of abdominal pelvic ascites  7   Status post previous a large infrarenal abdominal aortic aneurysm which is of stable size  8   Additional findings as noted                     I personally discussed this study with Yobani Yin on 9/17/2020 at 3:30 PM                Workstation performed: [id number]         CTA lower extremity left w wo contrast   Final Result by Rashel Tavera MD (09/17 4227)      Normal, although limited, left lower extremity arteriogram with three-vessel runoff to the foot  Moderate ascites  Diffuse subcutaneous edema in the lower extremities  Left testicle in the distal inguinal canal   Correlate clinically to include a nondistended testicle  Workstation performed: [id number]         XR chest 1 view portable   Final Result by Ny Christian MD (09/17 2216)      One of 2 images with bilateral ground glass opacity  As the other image is clear, this is presumably artifactual       No acute trauma              Workstation performed: [id number]         IR IN-patient paracentesis    (Results Pending)              Procedures  CriticalCare Time  Performed by: Colleen Green DO  Authorized by: Colleen Green DO     Critical care provider statement:     Critical care time (minutes):  90    Critical care time was exclusive of:  Separately billable procedures and treating other patients    Critical care was necessary to treat or prevent imminent or life-threatening deterioration of the following conditions:  Sepsis, cardiac failure and hepatic failure    Critical care was time spent personally by me on the following activities:  Blood draw for specimens, obtaining history from patient or surrogate, development of treatment plan with patient or surrogate, discussions with consultants, evaluation of patient's response to treatment, examination of patient, review of old charts, re-evaluation of patient's condition, ordering and review of laboratory studies, ordering and review of radiographic studies and interpretation of cardiac output measurements  ECG 12 Lead Documentation Only    Date/Time: 9/17/2020 1:18 PM  Performed by: Colleen Green DO  Authorized by: Colleen Green DO     Indications / Diagnosis: Pain  ECG reviewed by me, the ED Provider: yes    Patient location:  ED  Previous ECG:     Previous ECG:  Unavailable  Interpretation:     Interpretation: abnormal    Comments:      Accelerated junctional rhythm, rate 95, normal axis, normal intervals, no acute ST/T-wave abnormalities noted, no previous EKG for comparison  ED Course  ED Course as of Sep 17 1832   Thu Sep 17, 2020   1524 Case discussed with radiologist who notes 3mm possible SAH to the left frontal region on the coronal view (image 37)  1536 Case discussed with trauma attending, Dr Tereza Kwan, who recommends consulting and speaking with neurosurgeon  If there is no acute intervention necessary at this time then patient can remain at [facility] for further management  Otherwise patient will be transferred to trauma service for further management  1609 Case discussed with neurosurgeon on-call, Dr Shena Cota, who reviewed CT brain and at this time the possible area of subarachnoid hemorrhage is most likely an artifact  At this time no transfer is required  This message was relayed to Trauma attending Dr Tereza Kwan  1617 Case discussed with ICU advanced practitioner who reviewed case and at this time recommends step-down to admission  Awaiting call back from hospitalist                           Initial Sepsis Screening     [address] [facility] [address] Name 09/17/20 1411                Is the patient's history suggestive of a new or worsening infection? (!) Yes (Proceed)  -KF        Suspected source of infection  suspect infection, source unknown  -KF        Are two or more of the following signs & symptoms of infection both present and new to the patient?   (!) Yes (Proceed)  -KF        Indicate SIRS criteria  Tachycardia > 90 bpm;Leukocytosis (WBC > [address]453 IJL)  -KF        If the answer is yes to both questions, suspicion of sepsis is present          If severe sepsis is present AND tissue hypoperfusion perists in the hour after fluid resuscitation or lactate > 4, the patient meets criteria for SEPTIC SHOCK          Are any of the following organ dysfunction criteria present within 6 hours of suspected infection and SIRS criteria that are NOT considered to be chronic conditions?         Organ dysfunction          Date of presentation of severe sepsis          Time of presentation of severe sepsis          Tissue hypoperfusion persists in the hour after crystalloid fluid administration, evidenced, by either:  * OR * Lactate level is greater to or equal 4 mmol/dL ( ___ mmol/dL in comment field)  -KF        Was hypotension present within one hour of the conclusion of crystalloid fluid administration? No  -KF        Date of presentation of septic shock          Time of presentation of septic shock            User Key  (r) = Recorded By, (t) = Taken By, (c) = Cosigned By    [address] Name Provider Type    [address] [facility], DO Physician          SBIRT 20yo+      Most Recent Value   SBIRT (22 yo +)   In order to provide better care to our patients, we are screening all of our patients for alcohol and drug use  Would it be okay to ask you these screening questions? Yes Filed at: 09/17/2020 1315   Initial Alcohol Screen: US AUDIT-C    1  How often do you have a drink containing alcohol?  0 Filed at: 09/17/2020 1315   2  How many drinks containing alcohol do you have on a typical day you are drinking? 0 Filed at: 09/17/2020 1315   3a  Male UNDER 65: How often do you have five or more drinks on one occasion? 0 Filed at: 09/17/2020 1315   3b  FEMALE Any Age, or MALE 65+: How often do you have 4 or more drinks on one occassion? 0 Filed at: 09/17/2020 1315   Audit-C Score  0 Filed at: 09/17/2020 1315   STEFAN: How many times in the past year have you    Used an illegal drug or used a prescription medication for non-medical reasons?   Never Filed at: 09/17/2020 1315                  MDM  Number of Diagnoses or Management Options  Accidental fall, initial encounter: new and requires workup  Ascites: new and requires workup  Generalized weakness: new and requires workup  Severe sepsis [facility]): new and requires workup  UTI (urinary tract infection): new and requires workup  Diagnosis management comments: Obtain septic workup as well as trauma evaluation  Obtain chest x-ray, CT head/neck/chest/abdomen/pelvis/CTA runoff left lower extremity  Give IV fluids a plan for admission       Amount and/or Complexity of Data Reviewed  Clinical lab tests: ordered and reviewed  Tests in the radiology section of CPT®: ordered and reviewed  Tests in the medicine section of CPT®: ordered and reviewed  Discussion of test results with the performing providers: yes  Obtain history from someone other than the patient: yes  Review and summarize past medical records: yes  Discuss the patient with other providers: yes  Independent visualization of images, tracings, or specimens: yes    Risk of Complications, Morbidity, and/or Mortality  General comments: Patient had multiple lab derangements consistent with severe sepsis  30 cc/kilos IV fluid bolus as well as IV antibiotics started in the ED  Source may be possible UTI  Patient's CT head showed concern for possible 3 millimeter subarachnoid hemorrhage in 1 imaging  Case was discussed with neuro surgery who reviewed CT head and noted this is most likely an artifact and no further intervention is necessary  Patient's subsequent radiology studies did not show any acute traumatic abnormalities  There was concern for significant edema as well as a side is in the abdomen  At this time patient is admitted for further management  Patient family agrees with admission plans      Patient Progress  Patient progress: stable      Disposition  Final diagnoses:   Severe sepsis ([address] )   UTI (urinary tract infection)   Generalized weakness   Accidental fall, initial encounter   Ascites     Time reflects when diagnosis was documented in both MDM as applicable and the Disposition within this note     Time User Action Codes Description Comment    9/17/2020  5:01 PM Judithe Stai Add [A41 9,  R65 20] Severe sepsis ([address] )     9/17/2020  5:01 PM Ferdous, Roseann Alex Add [N39 0] UTI (urinary tract infection)     9/17/2020  5:01 PM Ferdous, Roseann Alex Add [R53 1] Generalized weakness     9/17/2020  5:01 PM Judithe Stai Add [W19  XXXA] Accidental fall, initial encounter     9/17/2020  5:01 PM Judithe Stai Add [R18 8] Ascites       ED Disposition     ED Disposition Condition Date/Time Comment    Admit Stable Thu Sep 17, 2020  5:01 PM Case was discussed with ICU AP and the patient's admission status was agreed to be Admission Status: inpatient status to the service of Dr Gina Hale  Follow-up Information    None         Current Discharge Medication List      CONTINUE these medications which have NOT CHANGED    Details   furosemide (LASIX) 20 mg tablet Take 20 mg by mouth daily      lactulose (CHRONULAC) 10 g/15 mL solution Take 20 g by mouth 2 (two) times a day      nadolol (CORGARD) 20 mg tablet Take 20 mg by mouth daily      spironolactone (ALDACTONE) 50 mg tablet Take 50 mg by mouth daily           No discharge procedures on file      PDMP Review     None          ED Provider  Electronically Signed by           Gatito Tiwari DO  09/17/20 4627

## 2020-09-17 NOTE — ASSESSMENT & PLAN NOTE
Pt with controlled fall to ground  No LOC  Head ct read as "possible small subarachnoid hemorrhage" - Discussion with neurosurgery -  They feel it is artifact  Neuro exams  No significant fractures on imaging

## 2020-09-17 NOTE — H&P
H&P- Adwoa Late 1955, 76 y o  male MRN: [id number]    Unit/Bed#: -01 Encounter: [id number]    Primary Care Provider: No primary care provider on file  Date and time admitted to hospital: 9/17/2020  1:08 PM    Principal Problem:    Severe sepsis ([facility] )  Active Problems:    UTI (urinary tract infection)    Lactic acidemia    MILLER (nonalcoholic steatohepatitis)    Ascites    SAMANTHA (acute kidney injury) ([facility] )    Hyperbilirubinemia    Coagulopathy (HCC)    Thrombocytopenia (HCC)    Non-obstructive hypertrophic cardiomyopathy (HCC)    Elevated CK    Generalized weakness    Elevated troponin level not due myocardial infarction    Abdominal aortic aneurysm (AAA) without rupture ([facility] )    History of difficult intubation    Ecchymosis    Accidental fall  Resolved Problems:    * No resolved hospital problems  *        * Severe sepsis (HCC)  Assessment & Plan  Likely 2/2 to UTI vs PNA vs SBP  Pt with tachycardia and leukocytosis on presnentation - Lactic acid 5 6 - pt received 30 ml/kg of IVF in ED  Urine consistent with UTI  PT with allergy to cephalosporins - Day 1 Zosyn  Blood and urine cultures pending  IR consulted for diagnostic paracentesis  Follow procalcitonin  De-escalate abx as able        Lactic acidemia  Assessment & Plan  Likely 2/2 to severe sepsis and prolonged period of laying on the floor  Pt will be unlikely to clear lactate normally 2/2 advanced liver disease  Follow lactate    UTI (urinary tract infection)  Assessment & Plan  Urinalysis consistent with acute cystitis  Cultures pending  Zosyn day 1    Thrombocytopenia (HCC)  Assessment & Plan  likley related to advanced liver failure  Plt 82 at this time  monitor    Coagulopathy [facility])  Assessment & Plan  2/2 liver failure  2 U FFP for INR reversal    Hyperbilirubinemia  Assessment & Plan  2/2 advanced liver disease  LFT in am to evaluate total bili and direct bili    SAMANTHA (acute kidney injury) ([facility] )  Assessment & Plan  Baseline creatinine 1 08  Creatinine 1 97 at this time  Likely 2/2 ATN vs possible hepatorenal failure  Pt received adequate fluid resuscitation in ED  Monitor UOP and BMP      Ascites  Assessment & Plan  Consult IR for diagnositc and therapeutic paracentesis  Paracentesis labs ordered    MILLER (nonalcoholic steatohepatitis)  Assessment & Plan  Pt sees Dr Crystal Gilmore of [facility] as outpatient  PT is scheduled for transplant evaluation with Dr Nika Pan on 9/21  MELD 35 - consistent with 52% 3 month mortality  Treat sepsis - consider GI consult for possible transfer for transplant      Elevated CK  Assessment & Plan  CK elevated at 599- may represent early rhabdomyolysis  Follow CK      Non-obstructive hypertrophic cardiomyopathy ([facility] )  Assessment & Plan  Pt with mitral murmur on exam  Previous Echo with severe non-obstructive hypertrophic cardiomyopathy - EF 70%  Repeat echo  Hold corgard - avoid tachycardia    Elevated troponin level not due myocardial infarction  Assessment & Plan  Likely 2/2 severe sepsis  Follow troponin  No evidence of ischemia on EKG  Echo pending    Generalized weakness  Assessment & Plan  2/2 UTI and decompensated liver failure    Abdominal aortic aneurysm (AAA) without rupture ([address] )  Assessment & Plan  S/p EVAR graft    Ecchymosis  Assessment & Plan  Pt with left sided ecchymosis - likely from lying on side for prolonged period  POA    Accidental fall  Assessment & Plan  Pt with controlled fall to ground  No LOC  Head ct read as "possible small subarachnoid hemorrhage" - Discussion with neurosurgery -  They feel it is artifact  Neuro exams  No significant fractures on imaging      -------------------------------------------------------------------------------------------------------------  Chief Complaint: weakenss - fall - hypotension    History of Present Illness      Pt is a pleasant 72yo male with PMH of previously compensated MILLER with recent decompensation with ecephalopathy, transaminitis, coagulopathy, LE edema, jaundice, worsening ascites and thrombocytopenia who follows with Chika LORENZANA as outpatient and recently referred for transplant evaluation, Hypertrophic cardiomyopathy without obstruction, AAA s/p EVAR repair, HTN and skin cancer, who presents to the [address] [place] [address] ED after being found on ground by his family  Pt admits to urinary frequency which he attributed to diuretic use, SOB on exertion, difficulty ambulating and weakenss, he specifically denies fever, chills, cough, chest pain, dizziness or focal deficits  ED workup reveals elevated lactate at 5 6, UA with nitrites, samantha, leukocytois, and worsening transamnitis  CT demonstrates b/l consolidations/atelectasis and small pleural efffusions, significant subcutaneous edema  CT head shows possible small SAH - discussed with neurosurgery who feels it is consistent with artifact  Pt was volume resuscitated in ED with 3 8L of crystalloid and started on broad spectrum abx  Despite adequate volume resusitation, pt remained mildly hypotensive  He will be admitted to [facility] for management and evaluation of his decompensated liver failure, severe sepsis and SAMANTHA      History obtained from the patient   -------------------------------------------------------------------------------------------------------------  Dispo: Admit to Stepdown Level 1    Code Status: Level 1 - Full Code  --------------------------------------------------------------------------------------------------------------  Review of Systems   Constitutional: Positive for activity change, appetite change and fatigue  Negative for chills, diaphoresis and fever  HENT: Negative  Eyes: Negative  Respiratory: Positive for shortness of breath  Negative for cough, chest tightness and wheezing  Cardiovascular: Positive for leg swelling  Negative for chest pain and palpitations  Gastrointestinal: Positive for abdominal distention and diarrhea   Negative for abdominal pain and constipation  Endocrine: Negative  Genitourinary: Positive for frequency  Negative for hematuria and scrotal swelling  Musculoskeletal: Negative  Allergic/Immunologic: Negative  Neurological: Positive for weakness  Hematological: Negative  Psychiatric/Behavioral: Negative  Physical Exam  Vitals signs and nursing note reviewed  Constitutional:       General: He is not in acute distress  Appearance: He is obese  He is toxic-appearing  HENT:      Head: Normocephalic and atraumatic  Mouth/Throat:      Mouth: Mucous membranes are moist    Eyes:      General: Scleral icterus present  Pupils: Pupils are equal, round, and reactive to light  Neck:      Musculoskeletal: No neck rigidity  Vascular: No carotid bruit  Cardiovascular:      Rate and Rhythm: Normal rate and regular rhythm  Heart sounds: Murmur present  Pulmonary:      Effort: Pulmonary effort is normal  Tachypnea present  No accessory muscle usage  Breath sounds: Decreased breath sounds present  Abdominal:      General: There is distension  Palpations: Abdomen is soft  Tenderness: There is no abdominal tenderness  Musculoskeletal:      Right lower leg: Edema present  Left lower leg: Edema present  Skin:     General: Skin is warm  Capillary Refill: Capillary refill takes less than 2 seconds  Findings: Bruising present  Neurological:      General: No focal deficit present  Mental Status: He is oriented to person, place, and time and easily aroused  Motor: Weakness present     Psychiatric:         Behavior: Behavior is cooperative        --------------------------------------------------------------------------------------------------------------  Vitals:   Vitals:    09/17/20 1715 09/17/20 1751 09/17/20 1800 09/17/20 1830   BP: (!) 84/51 153/88 94/51 102/57   BP Location:  Right arm     Pulse: 97 94 94 91   Resp: 20 21 21 22   Temp:  98 °F (36 7 °C)     TempSrc: Axillary     SpO2: 90% (!) 86% 92% 93%   Weight:  120 kg (264 lb 5 3 oz)     Height:  5' 10" (1 778 m)       Temp  Min: 97 2 °F (36 2 °C)  Max: 98 °F (36 7 °C)  IBW: 73 kg  Height: 5' 10" (177 8 cm)  Body mass index is 37 93 kg/m²  Laboratory and Diagnostics:  Results from last 7 days   Lab Units 09/17/20  1320   WBC Thousand/uL 15 48*   HEMOGLOBIN g/dL 12 1   HEMATOCRIT % 34 9*   PLATELETS Thousands/uL 82*   BANDS PCT % 5   MONO PCT % 4     Results from last 7 days   Lab Units 09/17/20  1320   SODIUM mmol/L 124*   POTASSIUM mmol/L 5 0   CHLORIDE mmol/L 94*   CO2 mmol/L 21   ANION GAP mmol/L 9   BUN mg/dL 36*   CREATININE mg/dL 1 97*   CALCIUM mg/dL 8 1*   GLUCOSE RANDOM mg/dL 75   ALT U/L 99*   AST U/L 238*   ALK PHOS U/L 144*   ALBUMIN g/dL 1 4*   TOTAL BILIRUBIN mg/dL 9 50*     Results from last 7 days   Lab Units 09/17/20  1320   MAGNESIUM mg/dL 2 1      Results from last 7 days   Lab Units 09/17/20  1320   INR  3 44*   PTT seconds 75*      Results from last 7 days   Lab Units 09/17/20  1320   TROPONIN I ng/mL 0 29*     Results from last 7 days   Lab Units 09/17/20  1536 09/17/20  1335   LACTIC ACID mmol/L 5 1* 5 8*     ABG:    VBG:          Micro:  Results from last 7 days   Lab Units 09/17/20  1406 09/17/20  1335   BLOOD CULTURE  Received in Microbiology Lab  Culture in Progress  Received in Microbiology Lab  Culture in Progress  EKG: NSR on tele  Imaging:   TRAUMA - CT head wo contrast   Final Result         1  Tiny 3 mm focus of high attenuation in the medial anterior left frontal lobe possibly a small subarachnoid hemorrhage  No abnormal mass effect  2   No other acute intracranial abnormality noted  I personally discussed this study with Denzel Powers on 9/17/2020 at 3:30 PM                Workstation performed: [id number]         TRAUMA - CT spine cervical wo contrast   Final Result      No cervical spine fracture or traumatic malalignment               I personally discussed this study with Jorge L Hernández on 9/17/2020 at 3:30 PM                       Workstation performed: [id number]         TRAUMA - CT chest abdomen pelvis w contrast   Final Result         1  Questionable minimally displaced fracture the anterior left 9th rib  2   No other definite acute abnormality noted in the chest, abdomen or pelvis  3   Stranding in subcutaneous soft tissues of the left lateral lower thoracic and left abdominal wall likely related to the patient's trauma and/or prolong lying on the floor in the left lateral decubitus position  No hematoma noted  4  Cardiomegaly with scattered groundglass opacities in both lungs likely related to edema although infectious/inflammatory process, including Covid not entirely excludable  5   Small right greater than left pleural effusions which may be due to volume overload and/or 3rd spacing in this patient with cirrhosis  6   Cirrhotic changes in the liver with evidence for portal hypertension manifest as large gastric moderate sized esophageal varices as well as moderate amount of abdominal pelvic ascites  7   Status post previous a large infrarenal abdominal aortic aneurysm which is of stable size  8   Additional findings as noted  I personally discussed this study with Jorge L Hernández on 9/17/2020 at 3:30 PM                Workstation performed: [id number]         CTA lower extremity left w wo contrast   Final Result      Normal, although limited, left lower extremity arteriogram with three-vessel runoff to the foot  Moderate ascites  Diffuse subcutaneous edema in the lower extremities  Left testicle in the distal inguinal canal   Correlate clinically to include a nondistended testicle  Workstation performed: [id number]         XR chest 1 view portable   Final Result      One of 2 images with bilateral ground glass opacity    As the other image is clear, this is presumably artifactual  No acute trauma  Workstation performed: [id number]         IR IN-patient paracentesis    (Results Pending)      I have personally reviewed pertinent reports  and I have personally reviewed pertinent films in PACS      Historical Information   Past Medical History:   Diagnosis Date    Hypertension     Liver disease      History reviewed  No pertinent surgical history  Social History   Social History     Substance and Sexual Activity   Alcohol Use Never    Frequency: Never    Drinks per session: Patient refused    Binge frequency: Never     Social History     Substance and Sexual Activity   Drug Use Never     Social History     Tobacco Use   Smoking Status Never Smoker   Smokeless Tobacco Never Used       Family History:   History reviewed  No pertinent family history  I have reviewed this patient's family history and commented on sigificant items within the HPI      Medications:  Current Facility-Administered Medications   Medication Dose Route Frequency    calcium gluconate 2 g in sodium chloride 0 9 % 50 mL IVPB  2 g Intravenous Once    chlorhexidine (PERIDEX) 0 12 % oral rinse 15 mL  15 mL Swish & Spit Q12H [facility]    heparin (porcine) subcutaneous injection 5,000 Units  5,000 Units Subcutaneous Q8H [facility]    piperacillin-tazobactam (ZOSYN) 3 375 g in sodium chloride 0 9 % 100 mL IVPB  3 375 g Intravenous Q6H     Home medications:  Prior to Admission Medications   Prescriptions Last Dose Informant Patient Reported?  Taking?   furosemide (LASIX) 20 mg tablet Unknown at Unknown time  Yes No   Sig: Take 20 mg by mouth daily   lactulose (CHRONULAC) 10 g/15 mL solution Unknown at Unknown time  Yes No   Sig: Take 20 g by mouth 2 (two) times a day   nadolol (CORGARD) 20 mg tablet Unknown at Unknown time  Yes No   Sig: Take 20 mg by mouth daily   spironolactone (ALDACTONE) 50 mg tablet Unknown at Unknown time  Yes No   Sig: Take 50 mg by mouth daily      Facility-Administered Medications: None Allergies: Allergies   Allergen Reactions    Keflex [Cephalexin]        ------------------------------------------------------------------------------------------------------------  Advance Directive and Living Will:      Power of :    POLST:    ------------------------------------------------------------------------------------------------------------  Anticipated Length of Stay is > 2 midnights          Sara Zamudio PA-C        Portions of the record may have been created with voice recognition software  Occasional wrong word or "sound a like" substitutions may have occurred due to the inherent limitations of voice recognition software    Read the chart carefully and recognize, using context, where substitutions have occurred

## 2020-09-18 ENCOUNTER — APPOINTMENT (INPATIENT)
Dept: ULTRASOUND IMAGING | Facility: HOSPITAL | Age: 65
DRG: 871 | End: 2020-09-18
Payer: MEDICARE

## 2020-09-18 ENCOUNTER — APPOINTMENT (INPATIENT)
Dept: INTERVENTIONAL RADIOLOGY/VASCULAR | Facility: HOSPITAL | Age: 65
DRG: 871 | End: 2020-09-18
Payer: MEDICARE

## 2020-09-18 ENCOUNTER — APPOINTMENT (INPATIENT)
Dept: RADIOLOGY | Facility: HOSPITAL | Age: 65
DRG: 871 | End: 2020-09-18
Payer: MEDICARE

## 2020-09-18 ENCOUNTER — APPOINTMENT (INPATIENT)
Dept: NON INVASIVE DIAGNOSTICS | Facility: HOSPITAL | Age: 65
DRG: 871 | End: 2020-09-18
Payer: MEDICARE

## 2020-09-18 ENCOUNTER — TELEPHONE (OUTPATIENT)
Dept: INTERVENTIONAL RADIOLOGY/VASCULAR | Facility: HOSPITAL | Age: 65
End: 2020-09-18

## 2020-09-18 PROBLEM — R74.8 ELEVATED CK: Status: RESOLVED | Noted: 2020-09-17 | Resolved: 2020-09-18

## 2020-09-18 PROBLEM — R78.81 POSITIVE BLOOD CULTURE: Status: ACTIVE | Noted: 2020-09-18

## 2020-09-18 PROBLEM — D69.6 THROMBOCYTOPENIA (HCC): Chronic | Status: ACTIVE | Noted: 2020-09-17

## 2020-09-18 PROBLEM — K75.81 NASH (NONALCOHOLIC STEATOHEPATITIS): Chronic | Status: ACTIVE | Noted: 2020-09-17

## 2020-09-18 PROBLEM — I42.2 NON-OBSTRUCTIVE HYPERTROPHIC CARDIOMYOPATHY (HCC): Chronic | Status: ACTIVE | Noted: 2020-09-17

## 2020-09-18 PROBLEM — E87.1 HYPONATREMIA: Status: ACTIVE | Noted: 2020-09-18

## 2020-09-18 PROBLEM — D64.9 ANEMIA: Status: ACTIVE | Noted: 2020-09-18

## 2020-09-18 PROBLEM — J96.01 ACUTE RESPIRATORY FAILURE WITH HYPOXIA (HCC): Status: ACTIVE | Noted: 2020-09-18

## 2020-09-18 PROBLEM — E16.2 HYPOGLYCEMIA: Status: ACTIVE | Noted: 2020-09-18

## 2020-09-18 PROBLEM — R65.21 SEPTIC SHOCK (HCC): Status: ACTIVE | Noted: 2020-09-17

## 2020-09-18 PROBLEM — E87.70 VOLUME OVERLOAD: Status: ACTIVE | Noted: 2020-09-18

## 2020-09-18 PROBLEM — R93.0 ABNORMAL CT SCAN OF HEAD: Status: ACTIVE | Noted: 2020-09-18

## 2020-09-18 PROBLEM — R18.8 ASCITES: Chronic | Status: ACTIVE | Noted: 2020-09-17

## 2020-09-18 LAB
ABO GROUP BLD BPU: NORMAL
ABO GROUP BLD BPU: NORMAL
ADDITIONAL SETTING: 10
ALBUMIN FLD-MCNC: <0.6 G/DL
ALBUMIN SERPL BCP-MCNC: 1.1 G/DL (ref 3.5–5)
ALP SERPL-CCNC: 122 U/L (ref 46–116)
ALT SERPL W P-5'-P-CCNC: 71 U/L (ref 12–78)
ANCILLARY VALUES: ABNORMAL
ANION GAP SERPL CALCULATED.3IONS-SCNC: 10 MMOL/L (ref 4–13)
ANION GAP SERPL CALCULATED.3IONS-SCNC: 6 MMOL/L (ref 4–13)
ANION GAP SERPL CALCULATED.3IONS-SCNC: 6 MMOL/L (ref 4–13)
ANION GAP SERPL CALCULATED.3IONS-SCNC: 7 MMOL/L (ref 4–13)
APPEARANCE FLD: NORMAL
ARTERIAL PATENCY WRIST A: ABNORMAL
AST SERPL W P-5'-P-CCNC: 173 U/L (ref 5–45)
BASE EXCESS BLDA CALC-SCNC: -3 MMOL/L (ref -2–3)
BASOPHILS # BLD AUTO: 0.04 THOUSANDS/ΜL (ref 0–0.1)
BASOPHILS NFR BLD AUTO: 0 % (ref 0–1)
BILIRUB DIRECT SERPL-MCNC: 5.16 MG/DL (ref 0–0.2)
BILIRUB SERPL-MCNC: 7.7 MG/DL (ref 0.2–1)
BPU ID: NORMAL
BPU ID: NORMAL
BUN SERPL-MCNC: 37 MG/DL (ref 5–25)
BUN SERPL-MCNC: 38 MG/DL (ref 5–25)
BUN SERPL-MCNC: 43 MG/DL (ref 5–25)
BUN SERPL-MCNC: 43 MG/DL (ref 5–25)
CA-I BLD-SCNC: 1.14 MMOL/L (ref 1.12–1.32)
CALCIUM SERPL-MCNC: 7.5 MG/DL (ref 8.3–10.1)
CALCIUM SERPL-MCNC: 7.6 MG/DL (ref 8.3–10.1)
CALCIUM SERPL-MCNC: 7.6 MG/DL (ref 8.3–10.1)
CALCIUM SERPL-MCNC: 7.9 MG/DL (ref 8.3–10.1)
CHLORIDE SERPL-SCNC: 97 MMOL/L (ref 100–108)
CHLORIDE SERPL-SCNC: 98 MMOL/L (ref 100–108)
CK MB SERPL-MCNC: 2.9 NG/ML (ref 0–5)
CK MB SERPL-MCNC: <1 % (ref 0–2.5)
CK SERPL-CCNC: 338 U/L (ref 39–308)
CO2 SERPL-SCNC: 20 MMOL/L (ref 21–32)
CO2 SERPL-SCNC: 21 MMOL/L (ref 21–32)
CO2 SERPL-SCNC: 23 MMOL/L (ref 21–32)
CO2 SERPL-SCNC: 24 MMOL/L (ref 21–32)
COLOR FLD: YELLOW
CREAT SERPL-MCNC: 1.73 MG/DL (ref 0.6–1.3)
CREAT SERPL-MCNC: 1.89 MG/DL (ref 0.6–1.3)
CREAT SERPL-MCNC: 1.93 MG/DL (ref 0.6–1.3)
CREAT SERPL-MCNC: 1.98 MG/DL (ref 0.6–1.3)
DS:DELIVERY SYSTEM: ABNORMAL
EOSINOPHIL # BLD AUTO: 0.11 THOUSAND/ΜL (ref 0–0.61)
EOSINOPHIL NFR BLD AUTO: 1 % (ref 0–6)
ERYTHROCYTE [DISTWIDTH] IN BLOOD BY AUTOMATED COUNT: 17.2 % (ref 11.6–15.1)
FIBRINOGEN PPP-MCNC: 83 MG/DL (ref 227–495)
FIO2 GAS DIL.REBREATH: 50 L
GFR SERPL CREATININE-BSD FRML MDRD: 34 ML/MIN/1.73SQ M
GFR SERPL CREATININE-BSD FRML MDRD: 35 ML/MIN/1.73SQ M
GFR SERPL CREATININE-BSD FRML MDRD: 36 ML/MIN/1.73SQ M
GFR SERPL CREATININE-BSD FRML MDRD: 40 ML/MIN/1.73SQ M
GLUCOSE SERPL-MCNC: 102 MG/DL (ref 65–140)
GLUCOSE SERPL-MCNC: 103 MG/DL (ref 65–140)
GLUCOSE SERPL-MCNC: 111 MG/DL (ref 65–140)
GLUCOSE SERPL-MCNC: 111 MG/DL (ref 65–140)
GLUCOSE SERPL-MCNC: 113 MG/DL (ref 65–140)
GLUCOSE SERPL-MCNC: 46 MG/DL (ref 65–140)
GLUCOSE SERPL-MCNC: 47 MG/DL (ref 65–140)
GLUCOSE SERPL-MCNC: 58 MG/DL (ref 65–140)
GLUCOSE SERPL-MCNC: 66 MG/DL (ref 65–140)
GLUCOSE SERPL-MCNC: 84 MG/DL (ref 65–140)
GLUCOSE SERPL-MCNC: 95 MG/DL (ref 65–140)
GLUCOSE SERPL-MCNC: 97 MG/DL (ref 65–140)
GLUCOSE SERPL-MCNC: 98 MG/DL (ref 65–140)
GLUCOSE SERPL-MCNC: 98 MG/DL (ref 65–140)
HCO3 BLDA-SCNC: 21.1 MMOL/L (ref 22–28)
HCT VFR BLD AUTO: 26.6 % (ref 36.5–49.3)
HCT VFR BLD AUTO: 27.1 % (ref 36.5–49.3)
HCT VFR BLD CALC: 27 % (ref 36.5–49.3)
HGB BLD-MCNC: 9.4 G/DL (ref 12–17)
HGB BLD-MCNC: 9.4 G/DL (ref 12–17)
HGB BLDA-MCNC: 9.2 G/DL (ref 12–17)
HISTIOCYTES NFR FLD: 5 %
IMM GRANULOCYTES # BLD AUTO: 0.25 THOUSAND/UL (ref 0–0.2)
IMM GRANULOCYTES NFR BLD AUTO: 2 % (ref 0–2)
INR PPP: 3.07 (ref 0.84–1.19)
INR PPP: 3.82 (ref 0.84–1.19)
LACTATE SERPL-SCNC: 2.4 MMOL/L (ref 0.5–2)
LACTATE SERPL-SCNC: 2.9 MMOL/L (ref 0.5–2)
LACTATE SERPL-SCNC: 3.1 MMOL/L (ref 0.5–2)
LYMPHOCYTES # BLD AUTO: 1.87 THOUSANDS/ΜL (ref 0.6–4.47)
LYMPHOCYTES NFR BLD AUTO: 14 % (ref 14–44)
LYMPHOCYTES NFR BLD AUTO: 37 %
MAGNESIUM SERPL-MCNC: 1.9 MG/DL (ref 1.6–2.6)
MAGNESIUM SERPL-MCNC: 1.9 MG/DL (ref 1.6–2.6)
MCH RBC QN AUTO: 37.2 PG (ref 26.8–34.3)
MCHC RBC AUTO-ENTMCNC: 34.7 G/DL (ref 31.4–37.4)
MCV RBC AUTO: 107 FL (ref 82–98)
MONO+MESO NFR FLD MANUAL: 2 %
MONOCYTES # BLD AUTO: 1.01 THOUSAND/ΜL (ref 0.17–1.22)
MONOCYTES NFR BLD AUTO: 19 %
MONOCYTES NFR BLD AUTO: 8 % (ref 4–12)
NEUTROPHILS # BLD AUTO: 10.12 THOUSANDS/ΜL (ref 1.85–7.62)
NEUTS SEG NFR BLD AUTO: 37 %
NEUTS SEG NFR BLD AUTO: 75 % (ref 43–75)
NRBC BLD AUTO-RTO: 0 /100 WBCS
OSMOLALITY UR: 289 MMOL/KG
PCO2 BLD: 22 MMOL/L (ref 21–32)
PCO2 BLD: 31.3 MM HG (ref 36–44)
PH BLD: 7.44 [PH] (ref 7.35–7.45)
PHOSPHATE SERPL-MCNC: 3.9 MG/DL (ref 2.3–4.1)
PLATELET # BLD AUTO: 57 THOUSANDS/UL (ref 149–390)
PMV BLD AUTO: 10 FL (ref 8.9–12.7)
PO2 BLD: 68 MM HG (ref 75–129)
POTASSIUM BLD-SCNC: 4.6 MMOL/L (ref 3.5–5.3)
POTASSIUM SERPL-SCNC: 4.3 MMOL/L (ref 3.5–5.3)
POTASSIUM SERPL-SCNC: 4.4 MMOL/L (ref 3.5–5.3)
POTASSIUM SERPL-SCNC: 4.7 MMOL/L (ref 3.5–5.3)
POTASSIUM SERPL-SCNC: 5.8 MMOL/L (ref 3.5–5.3)
PRESSURE SETTING: 8
PROCALCITONIN SERPL-MCNC: 1.03 NG/ML
PROT SERPL-MCNC: 5 G/DL (ref 6.4–8.2)
PROTHROMBIN TIME: 31.5 SECONDS (ref 11.6–14.5)
PROTHROMBIN TIME: 37.3 SECONDS (ref 11.6–14.5)
RBC # BLD AUTO: 2.53 MILLION/UL (ref 3.88–5.62)
RESPIRATORY RATE: 10
SAMPLE SITE: ABNORMAL
SAO2 % BLD FROM PO2: 94 % (ref 60–85)
SITE: NORMAL
SODIUM BLD-SCNC: 128 MMOL/L (ref 136–145)
SODIUM SERPL-SCNC: 125 MMOL/L (ref 136–145)
SODIUM SERPL-SCNC: 127 MMOL/L (ref 136–145)
SODIUM SERPL-SCNC: 128 MMOL/L (ref 136–145)
SODIUM SERPL-SCNC: 128 MMOL/L (ref 136–145)
SPECIMEN SOURCE: ABNORMAL
TOTAL CELLS COUNTED SPEC: 100
TROPONIN I SERPL-MCNC: 0.42 NG/ML
TROPONIN I SERPL-MCNC: 0.61 NG/ML
TROPONIN I SERPL-MCNC: 0.86 NG/ML
TSH SERPL DL<=0.05 MIU/L-ACNC: 1.08 UIU/ML (ref 0.36–3.74)
UNIT DISPENSE STATUS: NORMAL
UNIT DISPENSE STATUS: NORMAL
UNIT PRODUCT CODE: NORMAL
UNIT PRODUCT CODE: NORMAL
UNIT RH: NORMAL
UNIT RH: NORMAL
URATE SERPL-MCNC: 4.2 MG/DL (ref 4.2–8)
VENTILATION VALUE: 0
WBC # BLD AUTO: 13.4 THOUSAND/UL (ref 4.31–10.16)
WBC # FLD MANUAL: 165 /UL

## 2020-09-18 PROCEDURE — 87205 SMEAR GRAM STAIN: CPT | Performed by: NURSE PRACTITIONER

## 2020-09-18 PROCEDURE — 83935 ASSAY OF URINE OSMOLALITY: CPT | Performed by: NURSE PRACTITIONER

## 2020-09-18 PROCEDURE — 94760 N-INVAS EAR/PLS OXIMETRY 1: CPT

## 2020-09-18 PROCEDURE — 84443 ASSAY THYROID STIM HORMONE: CPT | Performed by: NURSE PRACTITIONER

## 2020-09-18 PROCEDURE — 80048 BASIC METABOLIC PNL TOTAL CA: CPT | Performed by: PHYSICIAN ASSISTANT

## 2020-09-18 PROCEDURE — 49083 ABD PARACENTESIS W/IMAGING: CPT

## 2020-09-18 PROCEDURE — 84145 PROCALCITONIN (PCT): CPT | Performed by: PHYSICIAN ASSISTANT

## 2020-09-18 PROCEDURE — 85014 HEMATOCRIT: CPT

## 2020-09-18 PROCEDURE — 83605 ASSAY OF LACTIC ACID: CPT | Performed by: PHYSICIAN ASSISTANT

## 2020-09-18 PROCEDURE — 84484 ASSAY OF TROPONIN QUANT: CPT | Performed by: NURSE PRACTITIONER

## 2020-09-18 PROCEDURE — 02HV33Z INSERTION OF INFUSION DEVICE INTO SUPERIOR VENA CAVA, PERCUTANEOUS APPROACH: ICD-10-PCS | Performed by: RADIOLOGY

## 2020-09-18 PROCEDURE — 82330 ASSAY OF CALCIUM: CPT

## 2020-09-18 PROCEDURE — 84300 ASSAY OF URINE SODIUM: CPT | Performed by: INTERNAL MEDICINE

## 2020-09-18 PROCEDURE — 36556 INSERT NON-TUNNEL CV CATH: CPT

## 2020-09-18 PROCEDURE — 36620 INSERTION CATHETER ARTERY: CPT

## 2020-09-18 PROCEDURE — 82948 REAGENT STRIP/BLOOD GLUCOSE: CPT

## 2020-09-18 PROCEDURE — 49083 ABD PARACENTESIS W/IMAGING: CPT | Performed by: RADIOLOGY

## 2020-09-18 PROCEDURE — 30233L1 TRANSFUSION OF NONAUTOLOGOUS FRESH PLASMA INTO PERIPHERAL VEIN, PERCUTANEOUS APPROACH: ICD-10-PCS | Performed by: INTERNAL MEDICINE

## 2020-09-18 PROCEDURE — 02HV33Z INSERTION OF INFUSION DEVICE INTO SUPERIOR VENA CAVA, PERCUTANEOUS APPROACH: ICD-10-PCS | Performed by: INTERNAL MEDICINE

## 2020-09-18 PROCEDURE — 80076 HEPATIC FUNCTION PANEL: CPT | Performed by: PHYSICIAN ASSISTANT

## 2020-09-18 PROCEDURE — 93306 TTE W/DOPPLER COMPLETE: CPT

## 2020-09-18 PROCEDURE — 87040 BLOOD CULTURE FOR BACTERIA: CPT | Performed by: NURSE PRACTITIONER

## 2020-09-18 PROCEDURE — 93306 TTE W/DOPPLER COMPLETE: CPT | Performed by: INTERNAL MEDICINE

## 2020-09-18 PROCEDURE — 83605 ASSAY OF LACTIC ACID: CPT | Performed by: NURSE PRACTITIONER

## 2020-09-18 PROCEDURE — 94002 VENT MGMT INPAT INIT DAY: CPT

## 2020-09-18 PROCEDURE — 84100 ASSAY OF PHOSPHORUS: CPT | Performed by: PHYSICIAN ASSISTANT

## 2020-09-18 PROCEDURE — 77001 FLUOROGUIDE FOR VEIN DEVICE: CPT

## 2020-09-18 PROCEDURE — 99223 1ST HOSP IP/OBS HIGH 75: CPT | Performed by: NURSE PRACTITIONER

## 2020-09-18 PROCEDURE — 76937 US GUIDE VASCULAR ACCESS: CPT

## 2020-09-18 PROCEDURE — 99223 1ST HOSP IP/OBS HIGH 75: CPT | Performed by: INTERNAL MEDICINE

## 2020-09-18 PROCEDURE — 71045 X-RAY EXAM CHEST 1 VIEW: CPT

## 2020-09-18 PROCEDURE — 76937 US GUIDE VASCULAR ACCESS: CPT | Performed by: RADIOLOGY

## 2020-09-18 PROCEDURE — 83735 ASSAY OF MAGNESIUM: CPT | Performed by: INTERNAL MEDICINE

## 2020-09-18 PROCEDURE — 84484 ASSAY OF TROPONIN QUANT: CPT | Performed by: PHYSICIAN ASSISTANT

## 2020-09-18 PROCEDURE — 76705 ECHO EXAM OF ABDOMEN: CPT

## 2020-09-18 PROCEDURE — 83735 ASSAY OF MAGNESIUM: CPT | Performed by: PHYSICIAN ASSISTANT

## 2020-09-18 PROCEDURE — 94640 AIRWAY INHALATION TREATMENT: CPT

## 2020-09-18 PROCEDURE — 82042 OTHER SOURCE ALBUMIN QUAN EA: CPT | Performed by: NURSE PRACTITIONER

## 2020-09-18 PROCEDURE — 99024 POSTOP FOLLOW-UP VISIT: CPT | Performed by: RADIOLOGY

## 2020-09-18 PROCEDURE — 82550 ASSAY OF CK (CPK): CPT | Performed by: PHYSICIAN ASSISTANT

## 2020-09-18 PROCEDURE — 82803 BLOOD GASES ANY COMBINATION: CPT

## 2020-09-18 PROCEDURE — 82553 CREATINE MB FRACTION: CPT | Performed by: PHYSICIAN ASSISTANT

## 2020-09-18 PROCEDURE — 85610 PROTHROMBIN TIME: CPT | Performed by: PHYSICIAN ASSISTANT

## 2020-09-18 PROCEDURE — 80048 BASIC METABOLIC PNL TOTAL CA: CPT | Performed by: NURSE PRACTITIONER

## 2020-09-18 PROCEDURE — 85384 FIBRINOGEN ACTIVITY: CPT | Performed by: NURSE PRACTITIONER

## 2020-09-18 PROCEDURE — 85018 HEMOGLOBIN: CPT | Performed by: NURSE PRACTITIONER

## 2020-09-18 PROCEDURE — 85025 COMPLETE CBC W/AUTO DIFF WBC: CPT | Performed by: PHYSICIAN ASSISTANT

## 2020-09-18 PROCEDURE — 80048 BASIC METABOLIC PNL TOTAL CA: CPT | Performed by: INTERNAL MEDICINE

## 2020-09-18 PROCEDURE — 84295 ASSAY OF SERUM SODIUM: CPT

## 2020-09-18 PROCEDURE — 85014 HEMATOCRIT: CPT | Performed by: NURSE PRACTITIONER

## 2020-09-18 PROCEDURE — 87070 CULTURE OTHR SPECIMN AEROBIC: CPT | Performed by: NURSE PRACTITIONER

## 2020-09-18 PROCEDURE — 84132 ASSAY OF SERUM POTASSIUM: CPT

## 2020-09-18 PROCEDURE — 0W9G3ZZ DRAINAGE OF PERITONEAL CAVITY, PERCUTANEOUS APPROACH: ICD-10-PCS | Performed by: RADIOLOGY

## 2020-09-18 PROCEDURE — C1751 CATH, INF, PER/CENT/MIDLINE: HCPCS

## 2020-09-18 PROCEDURE — 99291 CRITICAL CARE FIRST HOUR: CPT | Performed by: INTERNAL MEDICINE

## 2020-09-18 PROCEDURE — 36556 INSERT NON-TUNNEL CV CATH: CPT | Performed by: RADIOLOGY

## 2020-09-18 PROCEDURE — 85610 PROTHROMBIN TIME: CPT | Performed by: NURSE PRACTITIONER

## 2020-09-18 PROCEDURE — 82947 ASSAY GLUCOSE BLOOD QUANT: CPT

## 2020-09-18 PROCEDURE — 84550 ASSAY OF BLOOD/URIC ACID: CPT | Performed by: NURSE PRACTITIONER

## 2020-09-18 PROCEDURE — 89051 BODY FLUID CELL COUNT: CPT | Performed by: NURSE PRACTITIONER

## 2020-09-18 PROCEDURE — 94664 DEMO&/EVAL PT USE INHALER: CPT

## 2020-09-18 RX ORDER — FUROSEMIDE 10 MG/ML
80 INJECTION INTRAMUSCULAR; INTRAVENOUS ONCE
Status: COMPLETED | OUTPATIENT
Start: 2020-09-18 | End: 2020-09-18

## 2020-09-18 RX ORDER — MIDODRINE HYDROCHLORIDE 5 MG/1
5 TABLET ORAL
Status: DISCONTINUED | OUTPATIENT
Start: 2020-09-18 | End: 2020-09-18

## 2020-09-18 RX ORDER — MIDODRINE HYDROCHLORIDE 5 MG/1
10 TABLET ORAL
Status: DISCONTINUED | OUTPATIENT
Start: 2020-09-18 | End: 2020-09-24 | Stop reason: HOSPADM

## 2020-09-18 RX ORDER — ALBUMIN, HUMAN INJ 5% 5 %
12.5 SOLUTION INTRAVENOUS ONCE
Status: COMPLETED | OUTPATIENT
Start: 2020-09-18 | End: 2020-09-18

## 2020-09-18 RX ORDER — GUAIFENESIN 600 MG
600 TABLET, EXTENDED RELEASE 12 HR ORAL EVERY 12 HOURS SCHEDULED
Status: DISCONTINUED | OUTPATIENT
Start: 2020-09-18 | End: 2020-09-19

## 2020-09-18 RX ORDER — ALBUMIN, HUMAN INJ 5% 5 %
25 SOLUTION INTRAVENOUS ONCE
Status: COMPLETED | OUTPATIENT
Start: 2020-09-18 | End: 2020-09-18

## 2020-09-18 RX ORDER — MAGNESIUM SULFATE HEPTAHYDRATE 40 MG/ML
2 INJECTION, SOLUTION INTRAVENOUS ONCE
Status: COMPLETED | OUTPATIENT
Start: 2020-09-18 | End: 2020-09-19

## 2020-09-18 RX ORDER — LACTULOSE 20 G/30ML
20 SOLUTION ORAL 2 TIMES DAILY
Status: DISCONTINUED | OUTPATIENT
Start: 2020-09-18 | End: 2020-09-21

## 2020-09-18 RX ORDER — ALBUMIN (HUMAN) 12.5 G/50ML
25 SOLUTION INTRAVENOUS EVERY 6 HOURS
Status: DISCONTINUED | OUTPATIENT
Start: 2020-09-18 | End: 2020-09-23

## 2020-09-18 RX ORDER — LEVALBUTEROL 1.25 MG/.5ML
1.25 SOLUTION, CONCENTRATE RESPIRATORY (INHALATION)
Status: DISCONTINUED | OUTPATIENT
Start: 2020-09-18 | End: 2020-09-19

## 2020-09-18 RX ORDER — FUROSEMIDE 10 MG/ML
20 SYRINGE (ML) INJECTION CONTINUOUS
Status: DISCONTINUED | OUTPATIENT
Start: 2020-09-18 | End: 2020-09-21

## 2020-09-18 RX ORDER — DEXTROSE MONOHYDRATE 25 G/50ML
50 INJECTION, SOLUTION INTRAVENOUS ONCE
Status: COMPLETED | OUTPATIENT
Start: 2020-09-18 | End: 2020-09-18

## 2020-09-18 RX ADMIN — MIDODRINE HYDROCHLORIDE 10 MG: 5 TABLET ORAL at 16:23

## 2020-09-18 RX ADMIN — FUROSEMIDE 120 MG: 10 INJECTION, SOLUTION INTRAMUSCULAR; INTRAVENOUS at 12:03

## 2020-09-18 RX ADMIN — OCTREOTIDE ACETATE 50 MCG/HR: 500 INJECTION, SOLUTION INTRAVENOUS; SUBCUTANEOUS at 23:54

## 2020-09-18 RX ADMIN — NOREPINEPHRINE BITARTRATE 2 MCG/MIN: 1 INJECTION INTRAVENOUS at 16:07

## 2020-09-18 RX ADMIN — RIFAXIMIN 550 MG: 550 TABLET ORAL at 20:49

## 2020-09-18 RX ADMIN — PIPERACILLIN AND TAZOBACTAM 3.38 G: 3; .375 INJECTION, POWDER, LYOPHILIZED, FOR SOLUTION INTRAVENOUS at 12:23

## 2020-09-18 RX ADMIN — LACTULOSE 20 G: 10 SOLUTION ORAL at 18:00

## 2020-09-18 RX ADMIN — LEVALBUTEROL HYDROCHLORIDE 1.25 MG: 1.25 SOLUTION, CONCENTRATE RESPIRATORY (INHALATION) at 20:34

## 2020-09-18 RX ADMIN — GUAIFENESIN 600 MG: 600 TABLET ORAL at 20:49

## 2020-09-18 RX ADMIN — ALBUMIN (HUMAN) 25 G: 0.25 INJECTION, SOLUTION INTRAVENOUS at 10:53

## 2020-09-18 RX ADMIN — OCTREOTIDE ACETATE 25 MCG/HR: 500 INJECTION, SOLUTION INTRAVENOUS; SUBCUTANEOUS at 11:01

## 2020-09-18 RX ADMIN — FUROSEMIDE 80 MG: 10 INJECTION, SOLUTION INTRAMUSCULAR; INTRAVENOUS at 07:19

## 2020-09-18 RX ADMIN — ALBUMIN (HUMAN) 25 G: 0.25 INJECTION, SOLUTION INTRAVENOUS at 22:12

## 2020-09-18 RX ADMIN — ALBUMIN (HUMAN) 12.5 G: 12.5 INJECTION, SOLUTION INTRAVENOUS at 05:30

## 2020-09-18 RX ADMIN — Medication 30 MG/HR: at 22:50

## 2020-09-18 RX ADMIN — Medication 20 MG/HR: at 12:43

## 2020-09-18 RX ADMIN — PIPERACILLIN AND TAZOBACTAM 3.38 G: 3; .375 INJECTION, POWDER, LYOPHILIZED, FOR SOLUTION INTRAVENOUS at 05:18

## 2020-09-18 RX ADMIN — RIFAXIMIN 550 MG: 550 TABLET ORAL at 10:39

## 2020-09-18 RX ADMIN — IPRATROPIUM BROMIDE 0.5 MG: 0.5 SOLUTION RESPIRATORY (INHALATION) at 20:34

## 2020-09-18 RX ADMIN — MIDODRINE HYDROCHLORIDE 5 MG: 5 TABLET ORAL at 07:57

## 2020-09-18 RX ADMIN — ALBUMIN (HUMAN) 25 G: 12.5 INJECTION, SOLUTION INTRAVENOUS at 14:54

## 2020-09-18 RX ADMIN — ALBUMIN (HUMAN) 25 G: 0.25 INJECTION, SOLUTION INTRAVENOUS at 16:22

## 2020-09-18 RX ADMIN — LACTULOSE 20 G: 10 SOLUTION ORAL at 10:40

## 2020-09-18 RX ADMIN — PIPERACILLIN AND TAZOBACTAM 3.38 G: 3; .375 INJECTION, POWDER, LYOPHILIZED, FOR SOLUTION INTRAVENOUS at 20:34

## 2020-09-18 RX ADMIN — MIDODRINE HYDROCHLORIDE 5 MG: 5 TABLET ORAL at 10:39

## 2020-09-18 RX ADMIN — HEPARIN SODIUM 5000 UNITS: 5000 INJECTION INTRAVENOUS; SUBCUTANEOUS at 05:24

## 2020-09-18 RX ADMIN — DEXTROSE MONOHYDRATE 50 ML: 25 INJECTION, SOLUTION INTRAVENOUS at 06:43

## 2020-09-18 NOTE — ASSESSMENT & PLAN NOTE
· Pt with mitral murmur on exam  · Previous Echo with severe non-obstructive hypertrophic cardiomyopathy - EF 70%  · Repeat echo  · Hold corgard - avoid tachycardia

## 2020-09-18 NOTE — ASSESSMENT & PLAN NOTE
· Pt sees Dr Dionisio Velazquez of [facility] as outpatient  · PT is scheduled for transplant evaluation with Dr Shilpa Samson on 9/21  · MELD 35 - consistent with 52% 3 month mortality  · Treat sepsis as above - consider GI consult for possible transfer for transplant

## 2020-09-18 NOTE — ASSESSMENT & PLAN NOTE
· Baseline creatinine 1 08  · Creatinine 1 97 at this time  · Likely 2/2 ATN vs possible hepatorenal failure  · Pt received adequate fluid resuscitation in ED  · Monitor UOP and BMP  · Creat improving on serial BMPs

## 2020-09-18 NOTE — PROGRESS NOTES
Progress Note - Elis Ferro 1955, 76 y o  male MRN: [id number]    Unit/Bed#: -01 Encounter: [id number]    Primary Care Provider: No primary care provider on file     Date and time admitted to hospital: 9/17/2020  1:08 PM        * Severe sepsis (HCC)  Assessment & Plan  · Likely 2/2 to UTI vs PNA vs SBP  · Pt with tachycardia and leukocytosis on presnentation - Lactic acid 5 6 - pt received 30 ml/kg of IVF in ED  · Hold further crystalloids given third spacing, will consider PRN albumin   · Urine consistent with UTI  · PT with allergy to cephalosporins - Day 2 Zosyn  · Blood and urine cultures pending  · IR consulted for diagnostic paracentesis  · Follow procalcitonin  · De-escalate abx as able    Lactic acidemia  Assessment & Plan  · Likely 2/2 to severe sepsis and prolonged period of laying on the floor  · Pt will be unlikely to clear lactate normally 2/2 advanced liver disease  · Follow lactate    UTI (urinary tract infection)  Assessment & Plan  · Urinalysis consistent with acute cystitis  · Cultures pending  · Zosyn D2    Thrombocytopenia (HCC)  Assessment & Plan  · likley related to advanced liver failure  · Plt 82 at this time  · monitor    Coagulopathy ([facility] [address] )  Assessment & Plan  · 2/2 liver failure  · 2 U FFP for INR reversal  · No active bleeding     Hyperbilirubinemia  Assessment & Plan  · 2/2 advanced liver disease  · LFT in am to evaluate total bili and direct bili    SAMANTHA (acute kidney injury) ([address] )  Assessment & Plan  · Baseline creatinine 1 08  · Creatinine 1 97 at this time  · Likely 2/2 ATN vs possible hepatorenal failure  · Pt received adequate fluid resuscitation in ED  · Monitor UOP and BMP  · Creat improving on serial BMPs    Ascites  Assessment & Plan  · Consult IR for diagnositc and therapeutic paracentesis  · Paracentesis labs ordered    MILLER (nonalcoholic steatohepatitis)  Assessment & Plan  · Pt sees Dr Erasmo Mooney of [facility] as outpatient  · PT is scheduled for transplant evaluation with Dr King Garcia on 9/21  · MELD 35 - consistent with 52% 3 month mortality  · Treat sepsis as above - consider GI consult for possible transfer for transplant    Elevated CK  Assessment & Plan  · CK elevated at 599- may represent early rhabdomyolysis  · CK trended down on recheck     Non-obstructive hypertrophic cardiomyopathy ([facility] [address] )  Assessment & Plan  · Pt with mitral murmur on exam  · Previous Echo with severe non-obstructive hypertrophic cardiomyopathy - EF 70%  · Repeat echo  · Hold corgard - avoid tachycardia    Elevated troponin level not due myocardial infarction  Assessment & Plan  · Likely 2/2 severe sepsis  · Trend troponin  · No evidence of ischemia on EKG  · Echo pending    Generalized weakness  Assessment & Plan  · 2/2 UTI and decompensated liver failure    Abdominal aortic aneurysm (AAA) without rupture ([facility] [address] )  Assessment & Plan  · S/p EVAR graft    Ecchymosis  Assessment & Plan  · Pt with left sided ecchymosis - likely from lying on side for prolonged period  · POA    History of difficult intubation  Assessment & Plan  Noted     Accidental fall  Assessment & Plan  · Pt with controlled fall to ground  · No LOC  · Head ct read as "possible small subarachnoid hemorrhage" - Discussion with neurosurgery -  They feel it is artifact  · May consider reimaging   · Neuro exams  · No significant fractures on imaging        ----------------------------------------------------------------------------------------  HPI/24hr events: Continues to appear ill  Blood pressure improved overnight  Denies pain or other complaints  Continues on supplemental O2  Pending paracentesis via IR today       Disposition: Continue Stepdown Level 1 level of care   Code Status: Level 1 - Full Code  ---------------------------------------------------------------------------------------  SUBJECTIVE  "I'm good"    Review of Systems  Review of systems was reviewed and negative unless stated above in HPI/24-hour events ---------------------------------------------------------------------------------------  OBJECTIVE    Vitals   Vitals:    20 2230 20 2315 20 2341 20 0005   BP: 96/54 93/52 95/50 99/64   BP Location:   Right arm    Pulse: 78 90 81    Resp: (!) 26 (!) 30 20    Temp:  97 5 °F (36 4 °C) 97 5 °F (36 4 °C) 97 5 °F (36 4 °C)   TempSrc:  Oral Oral Oral   SpO2: 92% 91% 93%    Weight:       Height:         Temp (24hrs), Av 6 °F (36 4 °C), Min:97 2 °F (36 2 °C), Max:98 °F (36 7 °C)  Current: Temperature: 97 5 °F (36 4 °C)          Respiratory:  SpO2: SpO2: 93 %, SpO2 Activity: SpO2 Activity: At Rest, Capnography:    Nasal Cannula O2 Flow Rate (L/min): 4 L/min    Invasive/non-invasive ventilation settings   Respiratory    Lab Data (Last 4 hours)    None         O2/Vent Data (Last 4 hours)    None                Physical Exam  Vitals signs and nursing note reviewed  Constitutional:       General: He is awake  He is not in acute distress  Appearance: He is well-developed  He is ill-appearing  He is not diaphoretic  Interventions: Nasal cannula in place  HENT:      Head: Normocephalic and atraumatic  Nose: Nose normal  No congestion or rhinorrhea  Mouth/Throat:      Mouth: Mucous membranes are dry  Eyes:      General: Lids are normal          Right eye: No discharge  Left eye: No discharge  Extraocular Movements: Extraocular movements intact  Neck:      Musculoskeletal: Normal range of motion and neck supple  No muscular tenderness  Cardiovascular:      Rate and Rhythm: Normal rate and regular rhythm  Pulses:           Radial pulses are 2+ on the right side and 2+ on the left side  Heart sounds: Murmur present  Pulmonary:      Effort: Pulmonary effort is normal  No accessory muscle usage or respiratory distress  Breath sounds: Normal breath sounds  No stridor  No wheezing  Chest:      Chest wall: No tenderness     Abdominal:      General: Bowel sounds are normal       Palpations: Abdomen is soft  Comments: +ascites    Musculoskeletal: Normal range of motion  General: Tenderness (LLE with diffuse ecchymosis) present  Right lower leg: Edema (3+) present  Left lower leg: Edema (3+) present  Skin:     General: Skin is warm and dry  Capillary Refill: Capillary refill takes less than 2 seconds  Coloration: Skin is jaundiced  Neurological:      General: No focal deficit present  Mental Status: He is alert  GCS: GCS eye subscore is 3  GCS verbal subscore is 5  GCS motor subscore is 6  Cranial Nerves: No cranial nerve deficit  Psychiatric:         Mood and Affect: Mood normal          Speech: Speech normal          Behavior: Behavior is cooperative           Laboratory and Diagnostics:  Results from last 7 days   Lab Units 09/17/20  1846 09/17/20  1320   WBC Thousand/uL  --  15 48*   HEMOGLOBIN g/dL  --  12 1   HEMATOCRIT %  --  34 9*   PLATELETS Thousands/uL 63* 82*   BANDS PCT %  --  5   MONO PCT %  --  4     Results from last 7 days   Lab Units 09/18/20  0014 09/17/20  1320   SODIUM mmol/L 125* 124*   POTASSIUM mmol/L 5 8* 5 0   CHLORIDE mmol/L 98* 94*   CO2 mmol/L 20* 21   ANION GAP mmol/L 7 9   BUN mg/dL 37* 36*   CREATININE mg/dL 1 73* 1 97*   CALCIUM mg/dL 7 6* 8 1*   GLUCOSE RANDOM mg/dL 66 75   ALT U/L  --  99*   AST U/L  --  238*   ALK PHOS U/L  --  144*   ALBUMIN g/dL  --  1 4*   TOTAL BILIRUBIN mg/dL  --  9 50*     Results from last 7 days   Lab Units 09/17/20  1320   MAGNESIUM mg/dL 2 1      Results from last 7 days   Lab Units 09/17/20  1320   INR  3 44*   PTT seconds 75*      Results from last 7 days   Lab Units 09/18/20  0014 09/17/20  2058 09/17/20  1846 09/17/20  1320   TROPONIN I ng/mL 0 42* 0 38* 0 31* 0 29*     Results from last 7 days   Lab Units 09/18/20  0014 09/17/20  1536 09/17/20  1335   LACTIC ACID mmol/L 3 1* 5 1* 5 8*     ABG:    VBG:          Micro  Results from last 7 days Lab Units 09/17/20  1406 09/17/20  1335   BLOOD CULTURE  Received in Microbiology Lab  Culture in Progress  Received in Microbiology Lab  Culture in Progress  EKG: NSR on tele   Imaging: I have personally reviewed pertinent reports  Intake and Output  I/O       09/16 0701 - 09/17 0700 09/17 0701 - 09/18 0700    Blood  480    IV Piggyback  2700    Total Intake(mL/kg)  3180 (26 5)    Urine (mL/kg/hr)  235    Total Output  235    Net  +2945                Height and Weights   Height: 5' 10" (177 8 cm)  IBW: 73 kg  Body mass index is 37 93 kg/m²  Weight (last 2 days)     Date/Time   Weight    09/17/20 1751   120 (264 33)    09/17/20 1311   113 (250)                Nutrition       Diet Orders   (From admission, onward)             Start     Ordered    09/17/20 1823  Diet Clear Liquid  Diet effective now     Question Answer Comment   Diet Type Clear Liquid    RD to adjust diet per protocol?  Yes        09/17/20 1827                  Active Medications  Scheduled Meds:  Current Facility-Administered Medications   Medication Dose Route Frequency Provider Last Rate    albuterol  2 5 mg Nebulization Q4H PRN Summer Elliott MD      chlorhexidine  15 mL [facility] [place] [address] [place], [place]      heparin (porcine)  5,000 Units Subcutaneous [place], [place]      piperacillin-tazobactam  3 375 g Intravenous Q8H Summer Elliott MD 3 375 g (09/17/20 2105)     Continuous Infusions:     PRN Meds:   albuterol, 2 5 mg, Q4H PRN        Invasive Devices Review  Invasive Devices     Peripheral Intravenous Line            Peripheral IV 09/17/20 Left;Upper;Ventral (anterior) Arm less than 1 day    Peripheral IV 09/17/20 Right Antecubital less than 1 day                Rationale for remaining devices: IV abx  ---------------------------------------------------------------------------------------  Advance Directive and Living Will:      Power of :    POLST: ---------------------------------------------------------------------------------------  Care Time Delivered:   No Critical Care time spent       ELIZABETH Lazo      Portions of the record may have been created with voice recognition software  Occasional wrong word or "sound a like" substitutions may have occurred due to the inherent limitations of voice recognition software    Read the chart carefully and recognize, using context, where substitutions have occurred

## 2020-09-18 NOTE — CASE MANAGEMENT
Acknowledge Pt is currently on bipap and possible transfer to [facility] for liver transplant evaluation  CM assessment not completed at this time  CM to follow

## 2020-09-18 NOTE — PROGRESS NOTES
Miesha appears to be leaking, very positional   Unable to obtain a decent wave form  Allison JOHNSON made aware

## 2020-09-18 NOTE — ASSESSMENT & PLAN NOTE
· Pt with controlled fall to ground  · No LOC  · Head ct read as "possible small subarachnoid hemorrhage" - Discussion with neurosurgery -  They feel it is artifact  · May consider reimaging   · Neuro exams  · No significant fractures on imaging

## 2020-09-18 NOTE — DISCHARGE INSTRUCTIONS
Abdominal Paracentesis     WHAT YOU NEED TO KNOW:   Abdominal paracentesis is a procedure to remove abnormal fluid buildup in your abdomen  Fluid builds up because of liver problems, such as swelling and scarring  Heart failure, kidney disease, a mass, or problems with your pancreas may also cause fluid buildup  DISCHARGE INSTRUCTIONS:     Follow up with your healthcare provider as directed: Write down your questions so you remember to ask them during your visits  Wound care: Remove dressing after 24 hours  Leave glue in place  Return to your normal activities    Contact Interventional Radiology at [phone] Tre[place] PATIENTS: Contact Interventional Radiology at [phone]) Marilin Burnette PATIENTS: Contact Interventional Radiology at [phone]) if:  · You have a fever and your wound is red and swollen  · You have yellow, green, or bad-smelling discharge coming from your wound  · You have pain or swelling in your abdomen  · You have an upset stomach or you vomit  · You have sudden, sharp pain in your abdomen  · You urinate very little or not at all  · You feel confused and more tired than usual    · Your arm or leg feels warm, tender, and painful  It may look swollen and red  · You suddenly feel lightheaded and have trouble breathing

## 2020-09-18 NOTE — BRIEF OP NOTE (RAD/CATH)
INTERVENTIONAL RADIOLOGY PROCEDURE NOTE    Date: 9/18/2020    Procedure: Procedure name not found  Preoperative diagnosis:   1  Severe sepsis ([facility] [address] )    2  UTI (urinary tract infection)    3  Generalized weakness    4  Accidental fall, initial encounter    5  Ascites    6  MILLER (nonalcoholic steatohepatitis)    7  SAMANTHA (acute kidney injury) ([facility] [address] )    8  MILLER with portal HTN/ascites/varicies         Postoperative diagnosis: Same  Surgeon: Marlin Toure MD     Assistant: None  No qualified resident was available  Blood loss: minimal       Specimens: paracentesis fluid  Findings:     US guided paracentesis with ascites aspirated, right lower quadrant  US guided right 7 [place] triple lumen catheter placement  Excellent bidirectional flow  Catheter is ready for use  Complications: None immediate      Anesthesia: local

## 2020-09-18 NOTE — PROCEDURES
Central Line Insertion    Date/Time: 9/18/2020 2:33 PM  Performed by: Anuj Morley RN  Authorized by: Danita Meyer MD     Patient location:  Bedside  Consent:     Consent given by:  Patient  Universal protocol:     Patient identity confirmed:  Verbally with patient and arm band  Pre-procedure details:     Hand hygiene: Hand hygiene performed prior to insertion      Sterile barrier technique: All elements of maximal sterile technique followed      Skin preparation:  ChloraPrep    Skin preparation agent: Skin preparation agent completely dried prior to procedure    Indications:     Central line indications: no peripheral vascular access    Anesthesia (see MAR for exact dosages): Anesthesia method:  Local infiltration    Local anesthetic:  Lidocaine 1% w/o epi  Procedure details:     Location:  Right internal jugular    Vessel type: vein      Laterality:  Right    Approach: percutaneous technique used      Catheter type:  Triple lumen    Catheter size:  7 Fr (16 cm)    Landmarks identified: yes      Ultrasound guidance: yes      Sterile ultrasound techniques: Sterile gel and sterile probe covers were used      Manometry confirmation: no      Number of attempts:  1    Successful placement: yes    Post-procedure details:     Post-procedure:  Dressing applied and line sutured    Assessment:  Blood return through all ports and free fluid flow    Post-procedure complications: none      Patient tolerance of procedure:   Tolerated well, no immediate complications

## 2020-09-18 NOTE — ASSESSMENT & PLAN NOTE
· Likely 2/2 to UTI vs PNA vs SBP  · Pt with tachycardia and leukocytosis on presnentation - Lactic acid 5 6 - pt received 30 ml/kg of IVF in ED  · Hold further crystalloids given third spacing, will consider PRN albumin   · Urine consistent with UTI  · PT with allergy to cephalosporins - Day 2 Zosyn  · Blood and urine cultures pending  · IR consulted for diagnostic paracentesis  · Follow procalcitonin  · De-escalate abx as able

## 2020-09-18 NOTE — CONSULTS
Consultation - GI   Jay Rojas 76 y o  male MRN: [id number]  Unit/Bed#: -01 Encounter: [id number]    Consults  ASSESSMENT and PLAN    1  MILLER  Known to Dr Nisa Paez with history of MILLER cirrhosis more recently decompensated with hepatic encephalopathy, coagulopathy, ascites, thrombocytopenia and esophageal varices  One varix noted on EGD October, 2019  LFTs are elevated in a mixed pattern  MELD=37  AFP was normal in July  Was not taking lactulose due to illness, Xifaxan has been cost prohibitive  Continued taking diuretics pre hospital   Has been following sodium, fluid restrictions prior to onset of current illness  Seen by ELIZABETH Lozano 9/10 and noted with worsening liver decompensation  As per her observations, not certain he would be a suitable candidate for transplant due to his multiple comorbidities including significant coronary disease to include hypertrophic cardiomyopathy in addition to current decompensation from sepsis  -resume lactulose 20 g/30 mL b i d   -will add Xifaxan 550 mg b i d   -will hold Corgard, diuretics for now with hypotension  -await results of liver ultrasound with Dopplers  -scheduled for diagnostic and therapeutic paracentesis to exclude SBP  -was scheduled for ZOOM appointment with Dr Johanna Cho on 09/21/21 for transplant evaluation, consider transfer to [facility] when/if stable for evaluation if appropriate    2  Sepsis  Admitted with weakness, fall, increased jaundice  Lactic acidosis, hypertension, SAMANTHA likely urinary etiology worsened with decreased p o  intake, continued diuretics  Cannot completely exclude SBP     -continue antibiotics and medical management per critical care team   -await fluid analysis from paracentesis to exclude SBP     3  Diarrhea    Was having diarrhea when seen in our office 09/10/2020  Stool studies negative including C diff  Lactulose was held then resumed    No BM for 3 days per patient as he was not taking his lactulose due to current illness       4  Cardiomyopathy, hypertrophic  Follows with [facility]          Chief Complaint   Patient presents with    Weakness - Generalized     patient complaint of "loosing balance and guided self to the floor,landed on buttocks, denies loc, denies head injury, increasing weakness last month     Physician Requesting Consult: Aleshia Lambert MD    [facility] Pina Nobles is a 76 y o male with multiple comorbidities including hypertension, hypertrophic cardiomyopathy without obstruction AAA S/P EVAR as well as currently decompensated MILLER cirrhosis presented to the ER 09/17/2020 after falling out of bed onto the floor during the night as result of progressive weakness over the past 2-3 days PTA  He was found on the floor by a friend and brought to the hospital by ambulance  At that time his lactic acid was significantly elevated C/W sepsis  He was admitted to ICU and is currently being treated by the critical care team   He is on BiPAP at this time  Reports that he was feeling progressively weak, dizzy when standing over 2-3 days  He was not taking his lactulose because he felt queasy    Appetite was diminished  There was no vomiting and denies UGI or alarm symptoms  He continue taking his diuretics  He currently appears jaundiced with lower extremity edema, increased abdominal girth and mild asterixis  There is no confusion  He has not had a bowel movement in 3 days as result of holding his lactulose  Prior to this when seen in our office 09/10/2020 he had been having diarrhea  Stool studies were negative at that time  Discussed at length with nursing and critical care team     Historical Information   Past Medical History:   Diagnosis Date    Hypertension     Liver disease      History reviewed  No pertinent surgical history    Social History   Social History     Substance and Sexual Activity   Alcohol Use Never    Frequency: Never    Drinks per session: Patient refused    Binge frequency: Never     Social History     Substance and Sexual Activity   Drug Use Never     Social History     Tobacco Use   Smoking Status Never Smoker   Smokeless Tobacco Never Used     History reviewed  No pertinent family history  Meds/Allergies   Current Facility-Administered Medications   Medication Dose Route Frequency    albuterol inhalation solution 2 5 mg  2 5 mg Nebulization Q4H PRN    midodrine (PROAMATINE) tablet 5 mg  5 mg Oral TID AC    piperacillin-tazobactam (ZOSYN) 3 375 g in sodium chloride 0 9 % 100 mL IVPB (EXTENDED-INFUSION)  3 375 g Intravenous Q8H     Medications Prior to Admission   Medication    furosemide (LASIX) 20 mg tablet    lactulose (CHRONULAC) 10 g/15 mL solution    nadolol (CORGARD) 20 mg tablet    spironolactone (ALDACTONE) 50 mg tablet       Allergies   Allergen Reactions    Keflex [Cephalexin]        PHYSICALEXAM  Blood pressure 111/60, pulse 82, temperature 97 7 °F (36 5 °C), temperature source Oral, resp  rate 22, height 5' 10" (1 778 m), weight 120 kg (265 lb 3 4 oz), SpO2 93 %  Body mass index is 38 05 kg/m²  General Appearance: cooperative, alert, ill-appearing, dyspnea with activity  Head:  Normocephalic, atraumatic  Eyes:EOMI, bilateral scleral icterus  ENT:  Normal external appearance, normal mucosa  Neck:  Supple, symmetrical, trachea midline  Resp:   decreased to auscultation bilaterally; no rales, rhonchi or wheezing; respirations unlabored at rest, currently on BiPAP with oxygen    CV:  S1 S2, Regular rate and rhythm; no murmur, rub, or gallop  GI:   large, round, distended, ascites with +fluid wave, mild generalized tenderness with palpation but without rebound or guarding normal bowel sounds; no masses, no organomegaly   Rectal: Deferred  Musculoskeletal: No cyanosis, clubbing  Decrease ROM due to decreased activity tolerance with only    Marked BLE edema feet to knees  Skin:  No rashes, or lesions, multiple ecchymoses, generalized icterus    Heme/Lymph: No palpable cervical lymphadenopathy  Neuro: No gross deficits, AAOx3, +asterixis    Lab Results   Component Value Date    CALCIUM 7 9 (L) 09/18/2020    K 4 7 09/18/2020    CO2 23 09/18/2020    CL 98 (L) 09/18/2020    BUN 38 (H) 09/18/2020    CREATININE 1 89 (H) 09/18/2020     Lab Results   Component Value Date    WBC 13 40 (H) 09/18/2020    HGB 9 4 (L) 09/18/2020    HCT 26 6 (L) 09/18/2020     (H) 09/18/2020    PLT 57 (L) 09/18/2020     Lab Results   Component Value Date    ALT 71 09/18/2020     (H) 09/18/2020    ALKPHOS 122 (H) 09/18/2020     No results found for: AMYLASE  No results found for: LIPASE  No results found for: IRON, TIBC, FERRITIN  Lab Results   Component Value Date    INR 3 82 (H) 09/18/2020       Imaging Studies: I have personally reviewed pertinent reports  EKG, Pathology, and Other Studies: I have personally reviewed pertinent reports  REVIEW OF SYSTEMS:    CONSTITUTIONAL: Denies any fever, chills, rigors, and weight loss  Reports weakness, dizziness with standing  HEENT: No earache or tinnitus  Denies hearing loss or visual disturbances  CARDIOVASCULAR: No chest pain or palpitations  RESPIRATORY: Denies any cough, hemoptysis, but does report some shortness of breath or dyspnea on exertion  GASTROINTESTINAL: As noted in the History of Present Illness  GENITOURINARY: No problems with urination  Denies any hematuria or dysuria  NEUROLOGIC: No vertigo, denies headaches  MUSCULOSKELETAL: Denies any muscle or joint pain  SKIN: Denies skin rashes or itching  ENDOCRINE: Denies excessive thirst  Denies intolerance to heat or cold  PSYCHOSOCIAL: Denies depression or anxiety  Denies any recent memory loss

## 2020-09-18 NOTE — RESPIRATORY THERAPY NOTE
A-Line inserted in left radial under supervision by [place] NP  Good blood return, wave forms confirmed by RN  Line sutured and dressed  Pt tolerated well

## 2020-09-18 NOTE — CONSULTS
Consultation - Nephrology   Pina Nobles 76 y o  male MRN: [id number]  Unit/Bed#: -01 Encounter: [id number]    ASSESSMENT and PLAN:  Acute kidney injury (POA):  - suspect prerenal secondary to volume depletion with poor oral intake/diarrhea/diuretic use + hemodynamic perturbations with hypotension +/-  hepatorenal syndrome +/- rhabdomyolysis +/- ATN  - upon review of medical records, baseline creatinine 1 mg/dL dating back to 2018    - creatinine 1 97 mg/dL upon admission   - most recent creatinine 1 89 mg/dL today  * monitor for MARIA LUISA as patient received 120 mg IV contrast yesterday, 9/18  - post 2 5 L normal saline upon admission     - currently on midodrine 5 mg t i d, will increase to 10 mg t i d    - post furosemide 80 mg IV, plan for another 120 mg IV to be given with initiation of furosemide drip at 20 mg/hr  - continue albumin 25 g every 6 hours  -     - UA revealed clear dark tracey urine, urine specific gravity 1 025, 100 glucose, trace ketones, small blood, positive nitrite, negative leukocytes, trace protein, urobili 2, 30-50 RBC   - CT scan completed with contrast revealed punctate calcification in the left interpolar left renal parenchyma  - avoid NSAIDs, nephrotoxic agents, IV contrast if possible  - adjust medications to appropriate GFR  - monitor volume status with strict intake/output  Daily weight  - with urinary flower catheter  - will continue to monitor renal indices with repeat lab studies  Electrolytes:  - hyponatremia with most recent sodium 127 mEq    - sodium 1254 mEq upon admission     - suspect volume mediated  - when no longer NPO, will plan to initiate 1 5 L fluid restriction  - will complete secondary workup  - hyperkalemia improved with most recent potassium 4 7    - post medical management  - plan to implement low potassium diet when no longer NPO  - will continue to monitor with lab studies       Acid/base:  - most recent bicarbonate improved to 23 with anion gap of 6   - will continue to monitor with lab studies  Blood pressure:  - patient with hypotension    - outpatient regimen includes:  Furosemide 20 mg daily + Corgard 20 mg daily + spironolactone 50 mg daily  - currently off above regimen  Currently on:  Midodrine 5 mg t i d    - will increase midodrine to 10 mg t i d    - optimize hemodynamics  - maintain MAP > 65   - avoid hypotension and fluctuations of blood pressure to prevent decreased renal perfusion  Anemia with thrombocytopenia:  - most recent hemoglobin 9 4 grams/deciliter  - platelets 57    - goal hemoglobin greater than 8 grams/deciliter  - recommend PRBC transfusion for hemoglobin less than 7    - per primary  Other:  Severe sepsis:   - suspect secondary to UTI vs pneumonia vs SBP    - IR consulted for diagnostic paracentesis  - blood and urine cultures pending    - currently on Zosyn  - per critical care team      MILLER:   - patient follows with Dr Linette Jaquez of [facility] as outpatient  - patient with scheduled transplant eval on 9/21    - IR consulted for diagnostic paracentesis  - resumed lactulose + addition of xifaxan + octreotide drip  - please see plan above with acute kidney injury  - GI following  Nonobstructive hypertrophic cardiomyopathy:   - most recent EF 60% with grade 2 diastolic dysfunction  UTI:   - UA as above  - cultures pending     - currently on Zosyn  HISTORY OF PRESENT ILLNESS:  Requesting Physician: Socorro Bashir MD  Reason for Consult:  Acute kidney injury/HRS/hyponatremia    Dania Hidalgo is a 76 y o  male with history of MILLER with recent decompensation with encephalopathy, hypertension who was admitted to [facility] after presenting with generalized weakness and loss of balance  Per patient, night prior to admission, patient felt very weak and fell out of bed onto the floor    Patient was too weak to get up and laid on the floor all night until he was found by his friend in the morning  Upon assessment and evaluation, patient is resting in bed with bipap in place  Patient states he was not eating and drinking well prior to admission  He states he does have diarrhea when he takes his lactulose  He denies NSAID use  A renal consultation is requested today for assistance in the management of Acute kidney injury/HRS/hyponatremia  PAST MEDICAL HISTORY:  Past Medical History:   Diagnosis Date    Hypertension     Liver disease        PAST SURGICAL HISTORY:  History reviewed  No pertinent surgical history  ALLERGIES:  Allergies   Allergen Reactions    Keflex [Cephalexin]        SOCIAL HISTORY:  Social History     Substance and Sexual Activity   Alcohol Use Never    Frequency: Never    Drinks per session: Patient refused    Binge frequency: Never     Social History     Substance and Sexual Activity   Drug Use Never     Social History     Tobacco Use   Smoking Status Never Smoker   Smokeless Tobacco Never Used       FAMILY HISTORY:  History reviewed  No pertinent family history      MEDICATIONS:    Current Facility-Administered Medications:     albumin human (FLEXBUMIN) 25 % injection 25 g, 25 g, Intravenous, Q6H, ELIZABETH Castillo    albuterol inhalation solution 2 5 mg, 2 5 mg, Nebulization, Q4H PRN, Summer Elliott MD    furosemide (LASIX) 120 mg in dextrose 5 % 50 mL IVPB, 120 mg, Intravenous, Once, ELIZABETH Lynn    furosemide (LASIX) 500 mg infusion 50 mL, 20 mg/hr, Intravenous, Continuous, ELIZABETH Castillo    lactulose 20 g/30 mL oral solution 20 g, 20 g, Oral, BID, ELIZABETH Campbell    midodrine (PROAMATINE) tablet 5 mg, 5 mg, Oral, TID AC, ELIZABETH Castillo, 5 mg at 09/18/20 0757    octreotide (SandoSTATIN) 500 mcg in sodium chloride 0 9 % 250 mL infusion, 25 mcg/hr, Intravenous, Continuous, ELIZABETH Castillo    piperacillin-tazobactam (ZOSYN) 3 375 g in sodium chloride 0 9 % 100 mL IVPB (EXTENDED-INFUSION), 3 375 g, Intravenous, Q8H, Pinky Vanessa MD, Last Rate: 25 mL/hr at 09/18/20 0518, 3 375 g at 09/18/20 0518    rifaximin (XIFAXAN) tablet 550 mg, 550 mg, Oral, Q12H [address], ELIZABETH Campbell    REVIEW OF SYSTEMS:  All the systems were reviewed and were negative except as documented on the HPI  PHYSICAL EXAM:  Current Weight: Weight - Scale: 120 kg (265 lb 3 4 oz)  First Weight: Weight - Scale: 113 kg (250 lb)  Vitals:    09/18/20 0649 09/18/20 0800 09/18/20 1000 09/18/20 1009   BP: 95/53 111/60 (!) 80/51 (!) 84/50   BP Location: Left arm  Left arm    Pulse: 86 82 81 71   Resp: (!) 28 22 (!) 24 (!) 24   Temp: 97 7 °F (36 5 °C)      TempSrc: Oral      SpO2: 93% 93% 95%    Weight:       Height:           Intake/Output Summary (Last 24 hours) at 9/18/2020 1022  Last data filed at 9/18/2020 1001  Gross per 24 hour   Intake 3770 ml   Output 480 ml   Net 3290 ml     General: conscious, cooperative, not in acute distress  Skin: no rash, jaundice  Eyes: pale conjunctivae, no discharge  ENT: dry lips and mucous membranes  Neck: supple with trachea midline  Chest: clear breath sounds bilaterally   CVS: normal rate, regular rhythm  Abdomen: rounded, normoactive bowel sounds, non-tender  Extremities: +3 lower extremity edema bilaterally; tender to touch   Neuro: awake, interactive   Psych: appropriate affect       Invasive Devices:   Urethral Catheter Double-lumen 16 Fr   (Active)   Amt returned on insertion(mL) 15 mL 09/18/20 0724   Reasons to continue Urinary Catheter  Accurate I&O assessment in critically ill patients (48 hr  max) 09/18/20 0800   Goal for Removal Remove after 48 hrs of I/O monitoring 09/18/20 0800   Site Assessment Clean;Skin intact 09/18/20 0800   Collection Container Standard drainage bag 09/18/20 0800   Securement Method Securing device (Describe) 09/18/20 0724   Output (mL) 100 mL 09/18/20 1001       Lab Results:   Results from last 7 days Lab Units 09/18/20  0656 09/18/20  0508 09/18/20  0014 09/17/20  1846 09/17/20  1320   WBC Thousand/uL  --  13 40*  --   --  15 48*   HEMOGLOBIN g/dL 9 4* 9 4*  --   --  12 1   HEMATOCRIT % 26 6* 27 1*  --   --  34 9*   PLATELETS Thousands/uL  --  57*  --  63* 82*   POTASSIUM mmol/L  --  4 7 5 8*  --  5 0   CHLORIDE mmol/L  --  98* 98*  --  94*   CO2 mmol/L  --  23 20*  --  21   BUN mg/dL  --  38* 37*  --  36*   CREATININE mg/dL  --  1 89* 1 73*  --  1 97*   CALCIUM mg/dL  --  7 9* 7 6*  --  8 1*   MAGNESIUM mg/dL  --  1 9  --   --  2 1   PHOSPHORUS mg/dL  --  3 9  --   --   --    ALK PHOS U/L  --  122*  --   --  144*   ALT U/L  --  71  --   --  99*   AST U/L  --  173*  --   --  238*

## 2020-09-18 NOTE — ASSESSMENT & PLAN NOTE
· Likely 2/2 to severe sepsis and prolonged period of laying on the floor  · Pt will be unlikely to clear lactate normally 2/2 advanced liver disease  · Follow lactate

## 2020-09-18 NOTE — PROCEDURES
Central Line    Date/Time: 9/18/2020 3:48 PM  Performed by: Francisco Junior MD  Authorized by: Francisco Junior MD     Patient location:  Bedside  Consent:     Consent obtained:  Verbal and written    Consent given by:  Patient    Risks discussed:  Arterial puncture, bleeding, infection and incorrect placement    Alternatives discussed:  No treatment, delayed treatment and alternative treatment  Universal protocol:     Procedure explained and questions answered to patient or proxy's satisfaction: yes      Relevant documents present and verified: yes      Test results available and properly labeled: yes      Radiology Images displayed and confirmed  If images not available, report reviewed: yes      Required blood products, implants, devices, and special equipment available: yes      Site/side marked: yes      Immediately prior to procedure, a time out was called: yes      Patient identity confirmed:  Arm band and verbally with patient  Pre-procedure details:     Hand hygiene: Hand hygiene performed prior to insertion      Sterile barrier technique: All elements of maximal sterile technique followed      Skin preparation:  ChloraPrep  Indications:     Central line indications: medications requiring central line and hemodynamic monitoring    Anesthesia (see MAR for exact dosages): Anesthesia method:  Local infiltration    Local anesthetic:  Lidocaine 1% w/o epi  Procedure details:     Location:  Right internal jugular    Vessel type: vein      Laterality:  Right    Approach: percutaneous technique used      Patient position:  Flat    Catheter type:  Triple lumen 16cm    Catheter size:  7 Fr    Landmarks identified: yes      Ultrasound guidance: yes      Sterile ultrasound techniques: Sterile gel and sterile probe covers were used      Number of attempts:  1    Successful placement: yes      Vessel of catheter tip end:  Xray pending     Post-procedure details:     Post-procedure:  Dressing applied and line sutured Assessment:  Blood return through all ports    Post-procedure complications: none      Patient tolerance of procedure:   Tolerated well, no immediate complications

## 2020-09-19 ENCOUNTER — APPOINTMENT (INPATIENT)
Dept: RADIOLOGY | Facility: HOSPITAL | Age: 65
DRG: 871 | End: 2020-09-19
Payer: MEDICARE

## 2020-09-19 PROBLEM — R10.12 LEFT UPPER QUADRANT ABDOMINAL PAIN: Status: ACTIVE | Noted: 2020-09-19

## 2020-09-19 LAB
ABO GROUP BLD: NORMAL
ALBUMIN SERPL BCP-MCNC: 2.6 G/DL (ref 3.5–5)
ALP SERPL-CCNC: 211 U/L (ref 46–116)
ALT SERPL W P-5'-P-CCNC: 67 U/L (ref 12–78)
ANION GAP SERPL CALCULATED.3IONS-SCNC: 10 MMOL/L (ref 4–13)
ANION GAP SERPL CALCULATED.3IONS-SCNC: 11 MMOL/L (ref 4–13)
ANION GAP SERPL CALCULATED.3IONS-SCNC: 12 MMOL/L (ref 4–13)
AST SERPL W P-5'-P-CCNC: 163 U/L (ref 5–45)
BACTERIA UR CULT: NORMAL
BASE EXCESS BLDA CALC-SCNC: -2 MMOL/L (ref -2–3)
BILIRUB DIRECT SERPL-MCNC: 6.41 MG/DL (ref 0–0.2)
BILIRUB SERPL-MCNC: 11.3 MG/DL (ref 0.2–1)
BUN SERPL-MCNC: 41 MG/DL (ref 5–25)
BUN SERPL-MCNC: 43 MG/DL (ref 5–25)
BUN SERPL-MCNC: 44 MG/DL (ref 5–25)
CA-I BLD-SCNC: 1.12 MMOL/L (ref 1.12–1.32)
CALCIUM SERPL-MCNC: 7.5 MG/DL (ref 8.3–10.1)
CALCIUM SERPL-MCNC: 7.7 MG/DL (ref 8.3–10.1)
CALCIUM SERPL-MCNC: 8 MG/DL (ref 8.3–10.1)
CHLORIDE SERPL-SCNC: 97 MMOL/L (ref 100–108)
CHLORIDE SERPL-SCNC: 97 MMOL/L (ref 100–108)
CHLORIDE SERPL-SCNC: 98 MMOL/L (ref 100–108)
CO2 SERPL-SCNC: 23 MMOL/L (ref 21–32)
CO2 SERPL-SCNC: 23 MMOL/L (ref 21–32)
CO2 SERPL-SCNC: 24 MMOL/L (ref 21–32)
CORTIS AM PEAK SERPL-MCNC: 15.5 UG/DL (ref 4.2–22.4)
CREAT SERPL-MCNC: 1.84 MG/DL (ref 0.6–1.3)
CREAT SERPL-MCNC: 1.84 MG/DL (ref 0.6–1.3)
CREAT SERPL-MCNC: 1.91 MG/DL (ref 0.6–1.3)
ERYTHROCYTE [DISTWIDTH] IN BLOOD BY AUTOMATED COUNT: 17.2 % (ref 11.6–15.1)
FDP BLD QL AGGL: >40 <80
FIBRINOGEN PPP-MCNC: 66 MG/DL (ref 227–495)
FIO2 GAS DIL.REBREATH: 98 L
GFR SERPL CREATININE-BSD FRML MDRD: 36 ML/MIN/1.73SQ M
GFR SERPL CREATININE-BSD FRML MDRD: 37 ML/MIN/1.73SQ M
GFR SERPL CREATININE-BSD FRML MDRD: 37 ML/MIN/1.73SQ M
GLUCOSE SERPL-MCNC: 117 MG/DL (ref 65–140)
GLUCOSE SERPL-MCNC: 120 MG/DL (ref 65–140)
GLUCOSE SERPL-MCNC: 126 MG/DL (ref 65–140)
GLUCOSE SERPL-MCNC: 126 MG/DL (ref 65–140)
GLUCOSE SERPL-MCNC: 128 MG/DL (ref 65–140)
GLUCOSE SERPL-MCNC: 152 MG/DL (ref 65–140)
GLUCOSE SERPL-MCNC: 169 MG/DL (ref 65–140)
HCO3 BLDA-SCNC: 21.8 MMOL/L (ref 22–28)
HCT VFR BLD AUTO: 24.4 % (ref 36.5–49.3)
HCT VFR BLD CALC: 25 % (ref 36.5–49.3)
HGB BLD-MCNC: 8.2 G/DL (ref 12–17)
HGB BLDA-MCNC: 8.5 G/DL (ref 12–17)
INR PPP: 3.76 (ref 0.84–1.19)
MAGNESIUM SERPL-MCNC: 2.2 MG/DL (ref 1.6–2.6)
MCH RBC QN AUTO: 36.6 PG (ref 26.8–34.3)
MCHC RBC AUTO-ENTMCNC: 33.6 G/DL (ref 31.4–37.4)
MCV RBC AUTO: 109 FL (ref 82–98)
NRBC BLD AUTO-RTO: 0 /100 WBCS
OSMOLALITY UR/SERPL-RTO: 295 MMOL/KG (ref 282–298)
PCO2 BLD: 23 MMOL/L (ref 21–32)
PCO2 BLD: 32.6 MM HG (ref 36–44)
PH BLD: 7.43 [PH] (ref 7.35–7.45)
PLATELET # BLD AUTO: 56 THOUSANDS/UL (ref 149–390)
PMV BLD AUTO: 9.9 FL (ref 8.9–12.7)
PO2 BLD: 61 MM HG (ref 75–129)
POTASSIUM BLD-SCNC: 3.7 MMOL/L (ref 3.5–5.3)
POTASSIUM SERPL-SCNC: 3.5 MMOL/L (ref 3.5–5.3)
POTASSIUM SERPL-SCNC: 3.6 MMOL/L (ref 3.5–5.3)
POTASSIUM SERPL-SCNC: 3.9 MMOL/L (ref 3.5–5.3)
PROCALCITONIN SERPL-MCNC: 0.92 NG/ML
PROT SERPL-MCNC: 5.3 G/DL (ref 6.4–8.2)
PROTHROMBIN TIME: 36.9 SECONDS (ref 11.6–14.5)
RBC # BLD AUTO: 2.24 MILLION/UL (ref 3.88–5.62)
RH BLD: NEGATIVE
SAO2 % BLD FROM PO2: 92 % (ref 60–85)
SODIUM 24H UR-SCNC: 44 MOL/L
SODIUM BLD-SCNC: 132 MMOL/L (ref 136–145)
SODIUM SERPL-SCNC: 131 MMOL/L (ref 136–145)
SODIUM SERPL-SCNC: 132 MMOL/L (ref 136–145)
SODIUM SERPL-SCNC: 132 MMOL/L (ref 136–145)
SPECIMEN SOURCE: ABNORMAL
WBC # BLD AUTO: 10.29 THOUSAND/UL (ref 4.31–10.16)

## 2020-09-19 PROCEDURE — 85384 FIBRINOGEN ACTIVITY: CPT | Performed by: NURSE PRACTITIONER

## 2020-09-19 PROCEDURE — 82803 BLOOD GASES ANY COMBINATION: CPT

## 2020-09-19 PROCEDURE — 82330 ASSAY OF CALCIUM: CPT

## 2020-09-19 PROCEDURE — 71045 X-RAY EXAM CHEST 1 VIEW: CPT

## 2020-09-19 PROCEDURE — 84145 PROCALCITONIN (PCT): CPT | Performed by: INTERNAL MEDICINE

## 2020-09-19 PROCEDURE — 85362 FIBRIN DEGRADATION PRODUCTS: CPT | Performed by: NURSE PRACTITIONER

## 2020-09-19 PROCEDURE — 99233 SBSQ HOSP IP/OBS HIGH 50: CPT | Performed by: INTERNAL MEDICINE

## 2020-09-19 PROCEDURE — 94760 N-INVAS EAR/PLS OXIMETRY 1: CPT

## 2020-09-19 PROCEDURE — 83930 ASSAY OF BLOOD OSMOLALITY: CPT | Performed by: INTERNAL MEDICINE

## 2020-09-19 PROCEDURE — 83735 ASSAY OF MAGNESIUM: CPT | Performed by: NURSE PRACTITIONER

## 2020-09-19 PROCEDURE — 94660 CPAP INITIATION&MGMT: CPT

## 2020-09-19 PROCEDURE — 84295 ASSAY OF SERUM SODIUM: CPT

## 2020-09-19 PROCEDURE — 85610 PROTHROMBIN TIME: CPT | Performed by: INTERNAL MEDICINE

## 2020-09-19 PROCEDURE — 82533 TOTAL CORTISOL: CPT | Performed by: INTERNAL MEDICINE

## 2020-09-19 PROCEDURE — 94640 AIRWAY INHALATION TREATMENT: CPT

## 2020-09-19 PROCEDURE — 82948 REAGENT STRIP/BLOOD GLUCOSE: CPT

## 2020-09-19 PROCEDURE — 94003 VENT MGMT INPAT SUBQ DAY: CPT

## 2020-09-19 PROCEDURE — NC001 PR NO CHARGE: Performed by: NURSE PRACTITIONER

## 2020-09-19 PROCEDURE — 80048 BASIC METABOLIC PNL TOTAL CA: CPT | Performed by: NURSE PRACTITIONER

## 2020-09-19 PROCEDURE — 85014 HEMATOCRIT: CPT

## 2020-09-19 PROCEDURE — 84132 ASSAY OF SERUM POTASSIUM: CPT

## 2020-09-19 PROCEDURE — 94002 VENT MGMT INPAT INIT DAY: CPT

## 2020-09-19 PROCEDURE — 99232 SBSQ HOSP IP/OBS MODERATE 35: CPT | Performed by: NURSE PRACTITIONER

## 2020-09-19 PROCEDURE — 80076 HEPATIC FUNCTION PANEL: CPT | Performed by: INTERNAL MEDICINE

## 2020-09-19 PROCEDURE — 80048 BASIC METABOLIC PNL TOTAL CA: CPT | Performed by: INTERNAL MEDICINE

## 2020-09-19 PROCEDURE — 85027 COMPLETE CBC AUTOMATED: CPT | Performed by: NURSE PRACTITIONER

## 2020-09-19 PROCEDURE — 82947 ASSAY GLUCOSE BLOOD QUANT: CPT

## 2020-09-19 PROCEDURE — 99291 CRITICAL CARE FIRST HOUR: CPT | Performed by: INTERNAL MEDICINE

## 2020-09-19 RX ORDER — POTASSIUM CHLORIDE 14.9 MG/ML
20 INJECTION INTRAVENOUS ONCE
Status: COMPLETED | OUTPATIENT
Start: 2020-09-20 | End: 2020-09-20

## 2020-09-19 RX ORDER — POTASSIUM CHLORIDE 29.8 MG/ML
40 INJECTION INTRAVENOUS ONCE
Status: COMPLETED | OUTPATIENT
Start: 2020-09-19 | End: 2020-09-20

## 2020-09-19 RX ORDER — METOLAZONE 2.5 MG/1
5 TABLET ORAL DAILY
Status: DISCONTINUED | OUTPATIENT
Start: 2020-09-19 | End: 2020-09-20

## 2020-09-19 RX ORDER — POTASSIUM CHLORIDE 29.8 MG/ML
40 INJECTION INTRAVENOUS ONCE
Status: COMPLETED | OUTPATIENT
Start: 2020-09-19 | End: 2020-09-19

## 2020-09-19 RX ADMIN — Medication 40 MG/HR: at 07:27

## 2020-09-19 RX ADMIN — ALBUMIN (HUMAN) 25 G: 0.25 INJECTION, SOLUTION INTRAVENOUS at 23:30

## 2020-09-19 RX ADMIN — RIFAXIMIN 550 MG: 550 TABLET ORAL at 20:33

## 2020-09-19 RX ADMIN — IPRATROPIUM BROMIDE 0.5 MG: 0.5 SOLUTION RESPIRATORY (INHALATION) at 07:58

## 2020-09-19 RX ADMIN — NOREPINEPHRINE BITARTRATE 2 MCG/MIN: 1 INJECTION INTRAVENOUS at 07:05

## 2020-09-19 RX ADMIN — OCTREOTIDE ACETATE 50 MCG/HR: 500 INJECTION, SOLUTION INTRAVENOUS; SUBCUTANEOUS at 20:33

## 2020-09-19 RX ADMIN — PIPERACILLIN AND TAZOBACTAM 3.38 G: 3; .375 INJECTION, POWDER, LYOPHILIZED, FOR SOLUTION INTRAVENOUS at 04:42

## 2020-09-19 RX ADMIN — METOLAZONE 5 MG: 2.5 TABLET ORAL at 09:52

## 2020-09-19 RX ADMIN — PIPERACILLIN AND TAZOBACTAM 3.38 G: 3; .375 INJECTION, POWDER, LYOPHILIZED, FOR SOLUTION INTRAVENOUS at 12:04

## 2020-09-19 RX ADMIN — MIDODRINE HYDROCHLORIDE 10 MG: 5 TABLET ORAL at 12:00

## 2020-09-19 RX ADMIN — MIDODRINE HYDROCHLORIDE 10 MG: 5 TABLET ORAL at 16:38

## 2020-09-19 RX ADMIN — POTASSIUM CHLORIDE 40 MEQ: 29.8 INJECTION, SOLUTION INTRAVENOUS at 20:34

## 2020-09-19 RX ADMIN — ALBUMIN (HUMAN) 25 G: 0.25 INJECTION, SOLUTION INTRAVENOUS at 04:42

## 2020-09-19 RX ADMIN — MIDODRINE HYDROCHLORIDE 10 MG: 5 TABLET ORAL at 06:36

## 2020-09-19 RX ADMIN — MAGNESIUM SULFATE IN WATER 2 G: 40 INJECTION, SOLUTION INTRAVENOUS at 01:30

## 2020-09-19 RX ADMIN — Medication 40 MG/HR: at 18:04

## 2020-09-19 RX ADMIN — PIPERACILLIN AND TAZOBACTAM 3.38 G: 3; .375 INJECTION, POWDER, LYOPHILIZED, FOR SOLUTION INTRAVENOUS at 20:04

## 2020-09-19 RX ADMIN — ALBUMIN (HUMAN) 25 G: 0.25 INJECTION, SOLUTION INTRAVENOUS at 11:24

## 2020-09-19 RX ADMIN — RIFAXIMIN 550 MG: 550 TABLET ORAL at 09:52

## 2020-09-19 RX ADMIN — LEVALBUTEROL HYDROCHLORIDE 1.25 MG: 1.25 SOLUTION, CONCENTRATE RESPIRATORY (INHALATION) at 07:58

## 2020-09-19 RX ADMIN — POTASSIUM CHLORIDE 40 MEQ: 29.8 INJECTION, SOLUTION INTRAVENOUS at 14:53

## 2020-09-19 RX ADMIN — ALBUMIN (HUMAN) 25 G: 0.25 INJECTION, SOLUTION INTRAVENOUS at 16:35

## 2020-09-19 RX ADMIN — LACTULOSE 20 G: 10 SOLUTION ORAL at 17:21

## 2020-09-19 RX ADMIN — OCTREOTIDE ACETATE 50 MCG/HR: 500 INJECTION, SOLUTION INTRAVENOUS; SUBCUTANEOUS at 09:37

## 2020-09-19 NOTE — ASSESSMENT & PLAN NOTE
· In the ED - CT head 3mm questionable small SAH  · Neurosurgery reviewed CT, feels this is artifact  · No further imaging or work up warranted

## 2020-09-19 NOTE — ASSESSMENT & PLAN NOTE
· Source - SBP vs PNA in setting of decompensated cirrhosis  · Pt with tachycardia, leukocytosis, SAMANTHA on presentation - Lactic acid 5 6 - pt received 30 ml/kg of IVF in ED  · CT chest - b/l GGO with R>L pleural effusions  · UA - positive nitrites only, does not appear to be UTI  · Blood culture - 1/2 positive gram cocci in clusters, repeat pending  · S/p paracentesis - fluid cultures pending  · Procalcitonin 1 0, repeat pending  · Pt with refractory hypotension following paracentesis, central line and arterial placed, levophed started  · Cont Levo with goal MAP >65  · Cont Zosyn- pt with allergy to cephalosporins

## 2020-09-19 NOTE — PROGRESS NOTES
Progress Note - Tabitha Portillo 1955, 76 y o  male MRN: [id number]    Unit/Bed#: -01 Encounter: [id number]    Primary Care Provider: No primary care provider on file     Date and time admitted to hospital: 9/17/2020  1:08 PM        * Septic shock (HCC)  Assessment & Plan  · Source - SBP vs PNA in setting of decompensated cirrhosis  · Pt with tachycardia, leukocytosis, SAMANTHA on presentation - Lactic acid 5 6 - pt received 30 ml/kg of IVF in ED  · CT chest - b/l GGO with R>L pleural effusions  · UA - positive nitrites only, does not appear to be UTI  · Blood culture - 1/2 positive gram cocci in clusters, repeat pending  · S/p paracentesis - fluid cultures pending  · Procalcitonin 1 0, repeat pending  · Pt with refractory hypotension following paracentesis, central line and arterial placed, levophed started  · Cont Levo with goal MAP >65  · Cont Zosyn- pt with allergy to cephalosporins    Acute respiratory failure with hypoxia (HCC)  Assessment & Plan  · Secondary to volume overload in setting of sepsis IVF administration with known decompensated cirrhosis  · Placed on Bipap 10/5 50% with sig improvement in WOB  · Trial HFNC today  · Cont diuresis with Lasix infusion  · CT chest - R>L pleural effusions, may need thoracentesis if no improvement with diuresis   · Cont Atrovent/Xopenex and Mucinex  · Goal SpO2 >92%    MILLER with portal HTN/ascites/varicies  Assessment & Plan  · Pt sees Dr Sally Shay of [facility] as outpatient  · PT is scheduled for transplant evaluation with Dr Yossi Villanueva on 9/21  · MELD 35 - consistent with 52% 3 month mortality  · Pt accepted at [facility] by hepatologist and critical care - awaiting available bed  · GI following  · Cont lactulose and Rifaximin  · Cont SBP ABX Zosyn as pt was admitted with signs of sepsis  · 9/18 S/p paracentesis with 3 2L fluid removed, culture pending    Hypoglycemia  Assessment & Plan  · Secondary to cirrhosis   · Cont to trend accuchecks Q4    Hyponatremia  Assessment & Plan  · Secondary to volume overload  · Urine Na pending  · Serum osm pending - although pt has now been on lasix infusion  · Cont diuresis with Lasix    Thrombocytopenia (HCC)  Assessment & Plan  · Secondary to decompensated cirrhosis  · Cont to trend plt  · Transfuse if <20 or signs of active bleeding    Lactic acidemia  Assessment & Plan  · Likely 2/2 to severe sepsis and prolonged period of laying on the floor  · Pt will be unlikely to clear lactate normally 2/2 advanced liver disease  · No need to further trend    Coagulopathy (HCC)  Assessment & Plan  · 2/2 liver failure  · INR 3 0, repeat 3 8  · S/p 2 u FFP  · Cont to trend INR  · No active bleeding     Hyperbilirubinemia  Assessment & Plan  · 2/2 advanced liver disease  · Cont to trend LFT's    Left upper quadrant abdominal pain  Assessment & Plan  · Pt with new onset LUQ pain worsened with palpation, also noted to have sub Q crepitus  · S/p paracentesis yesterday, puncture site on R lower abd  · Unclear source of pain and sub Q air  · GI following, will reach out regarding rec's for workup    SAMANTHA (acute kidney injury) ([facility] [address] )  Assessment & Plan  · Baseline creatinine 1 08  · Creatinine 1 97 at this time  · Likely secondary to hepatorenal failure  · Pt received adequate fluid resuscitation in ED  · Nephrology following  · Cont Lasix infusion  · Cont Midodrine and Levo with goal MAP >65  · Avoid nephrotoxic medications    Anemia  Assessment & Plan  · Unknown baseline, Hgb on admission 12, now trending 9 s/p IVF resuscitation   · Cont to monitor  · Transfuse if <7, caution with transfusion as pt is transplant candidate    Non-obstructive hypertrophic cardiomyopathy ([facility] [address] )  Assessment & Plan  · Pt with mitral murmur on initial exam  · Previous Echo with severe non-obstructive hypertrophic cardiomyopathy - EF 70%  · Repeat echo - EF 60%, G2DD, mod MVR, mild AS, no regional wall abnormalities  · Hold Nadolol due to hypotension - avoid tachycardia    Elevated troponin level not due myocardial infarction  Assessment & Plan  · Likely 2/2 severe sepsis  · Pt has history of hypertrophic cardiomyopathy  · ECHO - no regional wall abnormalities  · No need to further trend Trop  · No evidence of ischemia on EKG    Positive blood culture  Assessment & Plan  · 1/2 positive for gram positive cocci in clusters - likely contaminant   · Repeat pending    Abnormal CT scan of head  Assessment & Plan  · In the ED - CT head 3mm questionable small SAH  · Neurosurgery reviewed CT, feels this is artifact  · No further imaging or work up warranted    Accidental fall  Assessment & Plan  · Pt with controlled fall to ground  · No LOC  · Head ct read as "possible small subarachnoid hemorrhage" - Discussion with neurosurgery -  They feel it is artifact  · Neuro exams  · No significant fractures on imaging  · Left thigh ecchymosis and swelling - improved since admission      ----------------------------------------------------------------------------------------  HPI/24hr events: Pt placed on Bipap for acute hypoxic respiratory failure with improvement in WOB and oxygenation  Paracentesis performed by IR with 3200 cc fluid removed  Pt became hypotensive following, a central line and arterial line were placed and he was started on lose dose Levophed  Lasix infusion was also initiated due to volume overload, dose was increased overnight due to inadequate response  In the early morning pt was transitioned to HFNC due to persistent dry cough, tolerating well  Also has new c/o LUQ abd pain  Disposition: Continue Critical Care  - awaiting ICU bed at [address] [place] [address] Status: Level 1 - Full Code  ---------------------------------------------------------------------------------------  SUBJECTIVE  "I have this cough and something stuck in my throat " Pt denied abd pain, SOB, and states he is unsure if he's had a bowel movement in the last few days      Review of Systems Gastrointestinal: Positive for abdominal distention and abdominal pain (LUQ)  Review of systems was reviewed and negative unless stated above in HPI/24-hour events   ---------------------------------------------------------------------------------------  OBJECTIVE    Vitals   Vitals:    20 0300 20 0315 20 0330 20 0406   BP: 102/55 123/56 125/61    Pulse: 86 92 86    Resp: 22 (!) 26 (!) 26    Temp:       TempSrc:       SpO2: 93% 93% 93% 91%   Weight:       Height:         Temp (24hrs), Av 4 °F (36 3 °C), Min:97 2 °F (36 2 °C), Max:97 7 °F (36 5 °C)  Current: Temperature: (!) 97 2 °F (36 2 °C)  Arterial Line BP: 104/45  Arterial Line MAP (mmHg): 61 mmHg    Respiratory:  SpO2: SpO2: 91 %, SpO2 Device: O2 Device: High flow nasal cannula  Nasal Cannula O2 Flow Rate (L/min): 10 L/min    Invasive/non-invasive ventilation settings   Respiratory    Lab Data (Last 4 hours)    None         O2/Vent Data (Last 4 hours)       0349  0406        Non-Invasive Ventilation Mode BiPAP HFNC                  Physical Exam  Vitals signs and nursing note reviewed  Constitutional:       Appearance: He is ill-appearing  Cardiovascular:      Rate and Rhythm: Normal rate and regular rhythm  Pulses: Decreased pulses  Radial pulses are 1+ on the right side and 1+ on the left side  Dorsalis pedis pulses are 1+ on the right side and 1+ on the left side  Heart sounds: No murmur  No friction rub  No gallop  Pulmonary:      Breath sounds: Wheezing and rales present  No rhonchi  Abdominal:      General: Bowel sounds are decreased  There is distension  Palpations: Abdomen is soft  Tenderness: There is abdominal tenderness (LUQ)  There is no guarding  Comments: Sub-cutaneous crepitus palpated along anterior abdomen   Musculoskeletal:      Right lower leg: Edema present  Left lower leg: Edema present  Skin:     General: Skin is warm and dry  Capillary Refill: Capillary refill takes 2 to 3 seconds  Coloration: Skin is jaundiced (left hip and lower calf) and pale  Findings: Bruising present  Comments: Slight bleed at arterial line insertion site and central line insertion site  Puncture site from paracentesis with CDD   Neurological:      General: No focal deficit present  Mental Status: He is alert and oriented to person, place, and time           Laboratory and Diagnostics:  Results from last 7 days   Lab Units 09/18/20  1434 09/18/20  0656 09/18/20  0508 09/17/20  1846 09/17/20  1320   WBC Thousand/uL  --   --  13 40*  --  15 48*   HEMOGLOBIN g/dL  --  9 4* 9 4*  --  12 1   I STAT HEMOGLOBIN g/dl 9 2*  --   --   --   --    HEMATOCRIT %  --  26 6* 27 1*  --  34 9*   HEMATOCRIT, ISTAT % 27*  --   --   --   --    PLATELETS Thousands/uL  --   --  57* 63* 82*   NEUTROS PCT %  --   --  75  --   --    BANDS PCT %  --   --   --   --  5   MONOS PCT %  --   --  8  --   --    MONO PCT %  --   --   --   --  4     Results from last 7 days   Lab Units 09/18/20  2221 09/18/20  1616 09/18/20  1434 09/18/20  0508 09/18/20  0014 09/17/20  1320   SODIUM mmol/L 128* 128*  --  127* 125* 124*   POTASSIUM mmol/L 4 4 4 3  --  4 7 5 8* 5 0   CHLORIDE mmol/L 97* 98*  --  98* 98* 94*   CO2 mmol/L 21 24  --  23 20* 21   CO2, I-STAT mmol/L  --   --  22  --   --   --    ANION GAP mmol/L 10 6  --  6 7 9   BUN mg/dL 43* 43*  --  38* 37* 36*   CREATININE mg/dL 1 93* 1 98*  --  1 89* 1 73* 1 97*   CALCIUM mg/dL 7 5* 7 6*  --  7 9* 7 6* 8 1*   GLUCOSE RANDOM mg/dL 111 102  --  58* 66 75   ALT U/L  --   --   --  71  --  99*   AST U/L  --   --   --  173*  --  238*   ALK PHOS U/L  --   --   --  122*  --  144*   ALBUMIN g/dL  --   --   --  1 1*  --  1 4*   TOTAL BILIRUBIN mg/dL  --   --   --  7 70*  --  9 50*     Results from last 7 days   Lab Units 09/18/20  2221 09/18/20  0508 09/17/20  1320   MAGNESIUM mg/dL 1 9 1 9 2 1   PHOSPHORUS mg/dL  --  3 9  --       Results from last 7 days   Lab Units 09/18/20  0656 09/18/20  0508 09/17/20  1320   INR  3 82* 3 07* 3 44*   PTT seconds  --   --  75*      Results from last 7 days   Lab Units 09/18/20  1616 09/18/20  0508 09/18/20  0014 09/17/20  2058 09/17/20  1846 09/17/20  1320   TROPONIN I ng/mL 0 86* 0 61* 0 42* 0 38* 0 31* 0 29*     Results from last 7 days   Lab Units 09/18/20  1616 09/18/20  0508 09/18/20  0014 09/17/20  1536 09/17/20  1335   LACTIC ACID mmol/L 2 4* 2 9* 3 1* 5 1* 5 8*     ABG:    VBG:    Results from last 7 days   Lab Units 09/18/20  0508   PROCALCITONIN ng/ml 1 03*       Micro  Results from last 7 days   Lab Units 09/18/20  1617 09/18/20  1610 09/17/20  1406 09/17/20  1335   BLOOD CULTURE  Received in Microbiology Lab  Culture in Progress  Received in Microbiology Lab  Culture in Progress  No Growth at 24 hrs   --    GRAM STAIN RESULT   --   --   --  Gram positive cocci in clusters*       EKG: NSR HR 80-90  Imaging: I have personally reviewed pertinent reports  Intake and Output  I/O       09/17 0701 - 09/18 0700 09/18 0701 - 09/19 0700    P  O  240 356    I V  (mL/kg)  278 7 (2 3)    Blood 480     IV Piggyback 3050 700    Total Intake(mL/kg) 3770 (31 4) 1334 7 (11 1)    Urine (mL/kg/hr) 365 1700 (0 6)    Other  3200    Total Output 365 4900    Net +3405 -3565 3                Height and Weights   Height: 5' 10" (177 8 cm)  IBW: 73 kg  Body mass index is 38 05 kg/m²  Weight (last 2 days)     Date/Time   Weight    09/18/20 0600   120 (265 21)    09/17/20 1751   120 (264 33)    09/17/20 1311   113 (250)                Nutrition       Diet Orders   (From admission, onward)             Start     Ordered    09/18/20 0746  Diet NPO; Sips with meds  Diet effective now     Question Answer Comment   Diet Type NPO    NPO Except: Sips with meds    RD to adjust diet per protocol?  Yes        09/18/20 0763                  Active Medications  Scheduled Meds:  Current Facility-Administered Medications   Medication Dose Route Frequency Provider Last Rate    albumin human  25 g Intravenous Q6H ELIZABETH Castillo 0 g (09/18/20 2258)    albuterol  2 5 mg Nebulization Q4H PRN David Boston MD      furosemide  30 mg/hr Intravenous Continuous Sarah Noblesella CRNP 30 mg/hr (09/18/20 2250)    guaiFENesin  600 mg Oral Q12H [address] Sarahnicci NoblesellaELIZABETH      ipratropium  0 5 mg Nebulization 4x Daily Sarahnicci NoblesellaELIZABETH      lactulose  20 g Oral BID ELIZABETH Campbell      levalbuterol  1 25 mg Nebulization 4x Daily Sarahnicci NoblesellaELIZABETH      midodrine  10 mg Oral TID AC ELIZABETH Tidwell      norepinephrine  1-30 mcg/min Intravenous Titrated ELIZABETH Rajput 2 mcg/min (09/18/20 1607)    octreotide  50 mcg/hr Intravenous Continuous ELIZABETH Castillo 50 mcg/hr (09/18/20 2354)    piperacillin-tazobactam  3 375 g Intravenous Quentin Yuan MD 3 375 g (09/19/20 0442)    rifaximin  550 mg Oral Q12H [address] ELIZABETH Campbell       Continuous Infusions:  furosemide, 30 mg/hr, Last Rate: 30 mg/hr (09/18/20 2250)  norepinephrine, 1-30 mcg/min, Last Rate: 2 mcg/min (09/18/20 1607)  octreotide, 50 mcg/hr, Last Rate: 50 mcg/hr (09/18/20 2354)      PRN Meds:   albuterol, 2 5 mg, Q4H PRN        Invasive Devices Review  Invasive Devices     Central Venous Catheter Line            CVC Central Lines 09/18/20 less than 1 day          Peripheral Intravenous Line            Peripheral IV 09/17/20 Right Antecubital 1 day          Arterial Line            Arterial Line 09/18/20 Left Radial less than 1 day          Drain            Urethral Catheter Double-lumen 16 Fr  less than 1 day                Rationale for remaining devices: central line - meds requiring  Arterial line - HD monitoring, freq ABG  Roberts - accurate I/O with ongoing diuresis  ---------------------------------------------------------------------------------------  Advance Directive and Living Will:      Power of Attorney:    POLST:    ---------------------------------------------------------------------------------------  Care Time Delivered:   No Critical Care time spent       [address]      Portions of the record may have been created with voice recognition software  Occasional wrong word or "sound a like" substitutions may have occurred due to the inherent limitations of voice recognition software    Read the chart carefully and recognize, using context, where substitutions have occurred

## 2020-09-19 NOTE — PROGRESS NOTES
NEPHROLOGY PROGRESS NOTE    Lisa Amaya 76 y o  male MRN: [id number]  Unit/Bed#: -01 Encounter: [id number]  Reason for Consult:  Acute renal failure and hyponatremia    Patient is awake he was able have a conversation and seems alert for me  His sister was at the bedside  The patient is awaiting transfer to [facility] for liver transplant evaluation  Was informed of this by the ICU team     ASSESSMENT/PLAN:  1  Renal    Patient has acute renal failure and creatinine seems to be stable at 1 8  There is no significant change  He is under treatment for hepatorenal syndrome which was started by my partner who initially saw the patient yesterday  He is on octreotide, midodrine and albumin  He was also started on a Lasix infusion and urine output is excellent  Given that creatinine has remained stable and is the 1st time seeing the patient on not going to make any adjustments  Should I see the creatinine began to rise I likely would taper down the diuretic as he has had a nice response  2  Hyponatremia    Patient has mild hyponatremia  He can have non osmotic ADH stimulation related to cirrhotic liver disease and also renal insufficiency him up eating urinary water excretion  For now continue with diuretic infusion  3  Cirrhosis of liver  Gastroenterology is been seeing the patient  Apparently he has been accepted is awaiting transfer to [facility] tertiary center  SUBJECTIVE:  Review of Systems   Constitution: Positive for malaise/fatigue  Negative for chills, fever and night sweats  HENT: Negative  Eyes: Negative  Cardiovascular: Negative for chest pain, orthopnea and palpitations  Not moving much in no shortness of breath or dyspnea exertion reported   Respiratory: Negative  Negative for cough, shortness of breath, sputum production and wheezing  Wearing noninvasive ventilation     Gastrointestinal: Negative for abdominal pain, diarrhea, nausea and vomiting  Genitourinary: Roberts catheter no gross hematuria  Neurological: Positive for weakness  Negative for dizziness, focal weakness and headaches  Psychiatric/Behavioral:        Patient did not seen confused for me  He thanked me for speaking with him  OBJECTIVE:  Current Weight: Weight - Scale: 120 kg (264 lb 15 9 oz)  Vitals:Temp (24hrs), Av 5 °F (36 4 °C), Min:97 2 °F (36 2 °C), Max:97 8 °F (36 6 °C)  Current: Temperature: 97 5 °F (36 4 °C)   Blood pressure 135/62, pulse 82, temperature 97 5 °F (36 4 °C), temperature source Axillary, resp  rate 22, height 5' 10" (1 778 m), weight 120 kg (264 lb 15 9 oz), SpO2 96 %  , Body mass index is 38 02 kg/m²  Intake/Output Summary (Last 24 hours) at 2020 1626  Last data filed at 2020 1400  Gross per 24 hour   Intake 2059 86 ml   Output 3549 ml   Net -1489 14 ml       Physical Exam: /62   Pulse 82   Temp 97 5 °F (36 4 °C) (Axillary)   Resp 22   Ht 5' 10" (1 778 m)   Wt 120 kg (264 lb 15 9 oz)   SpO2 96%   BMI 38 02 kg/m²   Physical Exam  Constitutional:       General: He is not in acute distress  Appearance: He is ill-appearing  He is not toxic-appearing or diaphoretic  HENT:      Head: Atraumatic  Mouth/Throat:      Comments: Noninvasive ventilation applied  Eyes:      General: Scleral icterus present  Extraocular Movements: Extraocular movements intact  Neck:      Musculoskeletal: Normal range of motion and neck supple  Cardiovascular:      Rate and Rhythm: Normal rate and regular rhythm  Heart sounds: No friction rub  No gallop  Comments: Positive edema  Pulmonary:      Effort: Pulmonary effort is normal  No respiratory distress  Breath sounds: Normal breath sounds  No wheezing, rhonchi or rales  Abdominal:      General: Bowel sounds are normal  There is no distension  Palpations: Abdomen is soft  Tenderness: There is no abdominal tenderness  There is no rebound  Neurological:      Mental Status: He is alert and oriented to person, place, and time  Comments: Unable to accurately assess     Psychiatric:         Mood and Affect: Mood normal          Behavior: Behavior normal          Medications:    Current Facility-Administered Medications:     albumin human (FLEXBUMIN) 25 % injection 25 g, 25 g, Intravenous, Q6H, ELIZABETH Castillo, Stopped at 09/19/20 1200    albuterol inhalation solution 2 5 mg, 2 5 mg, Nebulization, Q4H PRN, Jyoti Cristobal MD    furosemide (LASIX) 500 mg infusion 50 mL, 40 mg/hr, Intravenous, Continuous, ELIZABETH Castillo, Last Rate: 4 mL/hr at 09/19/20 0727, 40 mg/hr at 09/19/20 0727    lactulose 20 g/30 mL oral solution 20 g, 20 g, Oral, BID, ELIZABETH Campbell, Stopped at 09/19/20 3004    metolazone (ZAROXOLYN) tablet 5 mg, 5 mg, Oral, Daily, ELIZABETH Castillo, 5 mg at 09/19/20 9095    midodrine (PROAMATINE) tablet 10 mg, 10 mg, Oral, TID AC, ELIZABETH Tidwell, 10 mg at 09/19/20 1200    norepinephrine (LEVOPHED) 4 mg (STANDARD CONCENTRATION) IV in sodium chloride 0 9% 250 mL, 1-30 mcg/min, Intravenous, Titrated, ELIZABETH Castillo, Last Rate: 7 5 mL/hr at 09/19/20 1000, 2 mcg/min at 09/19/20 1000    octreotide (SandoSTATIN) 500 mcg in sodium chloride 0 9 % 250 mL infusion, 50 mcg/hr, Intravenous, Continuous, ELIZABETH Castillo, Last Rate: 25 mL/hr at 09/19/20 0937, 50 mcg/hr at 09/19/20 0937    piperacillin-tazobactam (ZOSYN) 3 375 g in sodium chloride 0 9 % 100 mL IVPB (EXTENDED-INFUSION), 3 375 g, Intravenous, Q8H, Jyoti Cristobal MD, Last Rate: 25 mL/hr at 09/19/20 1204, 3 375 g at 09/19/20 1204    potassium chloride 40 mEq IVPB (premix), 40 mEq, Intravenous, Once, ELIZABETH Burton, Last Rate: 25 mL/hr at 09/19/20 1453, 40 mEq at 09/19/20 1453    rifaximin (XIFAXAN) tablet 550 mg, 550 mg, Oral, Q12H [facility] & half-way, ELIZABETH Campbell, 550 mg at 09/19/20 1727    Laboratory Results:  Lab Results   Component Value Date    WBC 10 29 (H) 09/19/2020    HGB 8 5 (L) 09/19/2020    HCT 25 (L) 09/19/2020     (H) 09/19/2020    PLT 56 (L) 09/19/2020     Lab Results   Component Value Date    SODIUM 132 (L) 09/19/2020    K 3 5 09/19/2020    CL 97 (L) 09/19/2020    CO2 23 09/19/2020    BUN 44 (H) 09/19/2020    CREATININE 1 84 (H) 09/19/2020    GLUC 126 09/19/2020    CALCIUM 7 7 (L) 09/19/2020     Lab Results   Component Value Date    CALCIUM 7 7 (L) 09/19/2020    PHOS 3 9 09/18/2020     No results found for: LABPROT

## 2020-09-19 NOTE — ASSESSMENT & PLAN NOTE
· Pt with controlled fall to ground  · No LOC  · Head ct read as "possible small subarachnoid hemorrhage" - Discussion with neurosurgery -  They feel it is artifact  · Neuro exams  · No significant fractures on imaging  · Left thigh ecchymosis and swelling - improved since admission

## 2020-09-19 NOTE — ASSESSMENT & PLAN NOTE
· Likely 2/2 severe sepsis  · Pt has history of hypertrophic cardiomyopathy  · ECHO - no regional wall abnormalities  · No need to further trend Trop  · No evidence of ischemia on EKG

## 2020-09-19 NOTE — ASSESSMENT & PLAN NOTE
· Likely 2/2 to severe sepsis and prolonged period of laying on the floor  · Pt will be unlikely to clear lactate normally 2/2 advanced liver disease  · No need to further trend

## 2020-09-19 NOTE — ASSESSMENT & PLAN NOTE
· Pt with new onset LUQ pain worsened with palpation, also noted to have sub Q crepitus  · S/p paracentesis yesterday, puncture site on R lower abd  · Unclear source of pain and sub Q air  · GI following, will reach out regarding rec's for workup

## 2020-09-19 NOTE — ASSESSMENT & PLAN NOTE
· Secondary to volume overload in setting of sepsis IVF administration with known decompensated cirrhosis  · Placed on Bipap 10/5 50% with sig improvement in WOB  · Trial HFNC today  · Cont diuresis with Lasix infusion  · CT chest - R>L pleural effusions, may need thoracentesis if no improvement with diuresis   · Cont Atrovent/Xopenex and Mucinex  · Goal SpO2 >92%

## 2020-09-19 NOTE — ASSESSMENT & PLAN NOTE
· Unknown baseline, Hgb on admission 12, now trending 9 s/p IVF resuscitation   · Cont to monitor  · Transfuse if <7, caution with transfusion as pt is transplant candidate

## 2020-09-19 NOTE — ASSESSMENT & PLAN NOTE
· Secondary to decompensated cirrhosis  · Cont to trend plt  · Transfuse if <20 or signs of active bleeding

## 2020-09-19 NOTE — ASSESSMENT & PLAN NOTE
· Secondary to volume overload  · Urine Na pending  · Serum osm pending - although pt has now been on lasix infusion  · Cont diuresis with Lasix

## 2020-09-19 NOTE — ASSESSMENT & PLAN NOTE
· Pt sees Dr Sandy Luna of [facility] as outpatient  · PT is scheduled for transplant evaluation with Dr Nikki Angel on 9/21  · MELD 35 - consistent with 52% 3 month mortality  · Pt accepted at [facility] by hepatologist and critical care - awaiting available bed  · GI following  · Cont lactulose and Rifaximin  · Cont SBP ABX Zosyn as pt was admitted with signs of sepsis  · 9/18 S/p paracentesis with 3 2L fluid removed, culture pending

## 2020-09-19 NOTE — ASSESSMENT & PLAN NOTE
· 2/2 liver failure  · INR 3 0, repeat 3 8  · S/p 2 u FFP  · Cont to trend INR  · No active bleeding

## 2020-09-19 NOTE — PROGRESS NOTES
Spoke with Dr Carrie Posada with anesthesia regarding the need for intubation prior to transfer to [facility] given worsening hypoxia in the setting of cirrhosis/volume overload with notable history of difficult airway  Per patient he states he was told his airway is small    In chart review patient had an EGD procedure with anesthesia last year in which documentation of airway was established with video laryngoscope on 1st attempt  At this time there are no beds available at [facility]  We have increased diuretics and were able to mildly titrate down his FiO2 on BiPAP  Anesthesia is aware that if a bed becomes available we would need to preemptively intubate for transport  They are aware and we are to call when intubation is needed

## 2020-09-19 NOTE — ASSESSMENT & PLAN NOTE
· Pt with mitral murmur on initial exam  · Previous Echo with severe non-obstructive hypertrophic cardiomyopathy - EF 70%  · Repeat echo - EF 60%, G2DD, mod MVR, mild AS, no regional wall abnormalities  · Hold Nadolol due to hypotension - avoid tachycardia

## 2020-09-19 NOTE — ASSESSMENT & PLAN NOTE
· Baseline creatinine 1 08  · Creatinine 1 97 at this time  · Likely secondary to hepatorenal failure  · Pt received adequate fluid resuscitation in ED  · Nephrology following  · Cont Lasix infusion  · Cont Midodrine and Levo with goal MAP >65  · Avoid nephrotoxic medications

## 2020-09-20 ENCOUNTER — APPOINTMENT (INPATIENT)
Dept: RADIOLOGY | Facility: HOSPITAL | Age: 65
DRG: 871 | End: 2020-09-20
Payer: MEDICARE

## 2020-09-20 PROBLEM — R10.12 LEFT UPPER QUADRANT ABDOMINAL PAIN: Status: RESOLVED | Noted: 2020-09-19 | Resolved: 2020-09-20

## 2020-09-20 PROBLEM — E16.2 HYPOGLYCEMIA: Status: RESOLVED | Noted: 2020-09-18 | Resolved: 2020-09-20

## 2020-09-20 PROBLEM — R78.81 POSITIVE BLOOD CULTURE: Status: RESOLVED | Noted: 2020-09-18 | Resolved: 2020-09-20

## 2020-09-20 PROBLEM — R53.1 GENERALIZED WEAKNESS: Status: RESOLVED | Noted: 2020-09-17 | Resolved: 2020-09-20

## 2020-09-20 PROBLEM — R57.9 SHOCK (HCC): Status: ACTIVE | Noted: 2020-09-17

## 2020-09-20 PROBLEM — E87.2 LACTIC ACIDEMIA: Status: RESOLVED | Noted: 2020-09-17 | Resolved: 2020-09-20

## 2020-09-20 PROBLEM — R77.8 ELEVATED TROPONIN LEVEL NOT DUE MYOCARDIAL INFARCTION: Status: RESOLVED | Noted: 2020-09-17 | Resolved: 2020-09-20

## 2020-09-20 LAB
ALBUMIN SERPL BCP-MCNC: 3.2 G/DL (ref 3.5–5)
ALBUMIN SERPL BCP-MCNC: 3.2 G/DL (ref 3.5–5)
ALP SERPL-CCNC: 90 U/L (ref 46–116)
ALP SERPL-CCNC: 92 U/L (ref 46–116)
ALT SERPL W P-5'-P-CCNC: 70 U/L (ref 12–78)
ALT SERPL W P-5'-P-CCNC: 71 U/L (ref 12–78)
ANION GAP SERPL CALCULATED.3IONS-SCNC: 10 MMOL/L (ref 4–13)
ANION GAP SERPL CALCULATED.3IONS-SCNC: 11 MMOL/L (ref 4–13)
ANION GAP SERPL CALCULATED.3IONS-SCNC: 8 MMOL/L (ref 4–13)
AST SERPL W P-5'-P-CCNC: 165 U/L (ref 5–45)
AST SERPL W P-5'-P-CCNC: 170 U/L (ref 5–45)
ATRIAL RATE: 91 BPM
ATRIAL RATE: 96 BPM
BASOPHILS # BLD AUTO: 0.03 THOUSANDS/ΜL (ref 0–0.1)
BASOPHILS NFR BLD AUTO: 0 % (ref 0–1)
BILIRUB SERPL-MCNC: 14.3 MG/DL (ref 0.2–1)
BILIRUB SERPL-MCNC: 15 MG/DL (ref 0.2–1)
BLD SMEAR INTERP: NORMAL
BUN SERPL-MCNC: 38 MG/DL (ref 5–25)
BUN SERPL-MCNC: 39 MG/DL (ref 5–25)
BUN SERPL-MCNC: 40 MG/DL (ref 5–25)
CALCIUM ALBUM COR SERPL-MCNC: 8.3 MG/DL (ref 8.3–10.1)
CALCIUM ALBUM COR SERPL-MCNC: 8.5 MG/DL (ref 8.3–10.1)
CALCIUM SERPL-MCNC: 7.7 MG/DL (ref 8.3–10.1)
CALCIUM SERPL-MCNC: 7.9 MG/DL (ref 8.3–10.1)
CALCIUM SERPL-MCNC: 7.9 MG/DL (ref 8.3–10.1)
CALCIUM SERPL-MCNC: 8 MG/DL (ref 8.3–10.1)
CALCIUM SERPL-MCNC: 8.4 MG/DL (ref 8.3–10.1)
CHLORIDE SERPL-SCNC: 95 MMOL/L (ref 100–108)
CHLORIDE SERPL-SCNC: 95 MMOL/L (ref 100–108)
CHLORIDE SERPL-SCNC: 96 MMOL/L (ref 100–108)
CO2 SERPL-SCNC: 29 MMOL/L (ref 21–32)
CO2 SERPL-SCNC: 30 MMOL/L (ref 21–32)
CO2 SERPL-SCNC: 30 MMOL/L (ref 21–32)
CREAT SERPL-MCNC: 1.52 MG/DL (ref 0.6–1.3)
CREAT SERPL-MCNC: 1.56 MG/DL (ref 0.6–1.3)
CREAT SERPL-MCNC: 1.57 MG/DL (ref 0.6–1.3)
CREAT SERPL-MCNC: 1.59 MG/DL (ref 0.6–1.3)
CREAT SERPL-MCNC: 1.6 MG/DL (ref 0.6–1.3)
EOSINOPHIL # BLD AUTO: 0.25 THOUSAND/ΜL (ref 0–0.61)
EOSINOPHIL NFR BLD AUTO: 3 % (ref 0–6)
ERYTHROCYTE [DISTWIDTH] IN BLOOD BY AUTOMATED COUNT: 17.2 % (ref 11.6–15.1)
FERRITIN SERPL-MCNC: 1467 NG/ML (ref 8–388)
FIBRINOGEN PPP-MCNC: <60 MG/DL (ref 227–495)
FOLATE SERPL-MCNC: 5.9 NG/ML (ref 3.1–17.5)
GFR SERPL CREATININE-BSD FRML MDRD: 44 ML/MIN/1.73SQ M
GFR SERPL CREATININE-BSD FRML MDRD: 45 ML/MIN/1.73SQ M
GFR SERPL CREATININE-BSD FRML MDRD: 45 ML/MIN/1.73SQ M
GFR SERPL CREATININE-BSD FRML MDRD: 46 ML/MIN/1.73SQ M
GFR SERPL CREATININE-BSD FRML MDRD: 47 ML/MIN/1.73SQ M
GLUCOSE SERPL-MCNC: 102 MG/DL (ref 65–140)
GLUCOSE SERPL-MCNC: 128 MG/DL (ref 65–140)
GLUCOSE SERPL-MCNC: 128 MG/DL (ref 65–140)
GLUCOSE SERPL-MCNC: 130 MG/DL (ref 65–140)
GLUCOSE SERPL-MCNC: 147 MG/DL (ref 65–140)
HCT VFR BLD AUTO: 25.2 % (ref 36.5–49.3)
HGB BLD-MCNC: 8.6 G/DL (ref 12–17)
IMM GRANULOCYTES # BLD AUTO: 0.15 THOUSAND/UL (ref 0–0.2)
IMM GRANULOCYTES NFR BLD AUTO: 2 % (ref 0–2)
INR PPP: 4.58 (ref 0.84–1.19)
INR PPP: 4.89 (ref 0.84–1.19)
IRON SATN MFR SERPL: 88 %
IRON SERPL-MCNC: 73 UG/DL (ref 65–175)
LYMPHOCYTES # BLD AUTO: 1.29 THOUSANDS/ΜL (ref 0.6–4.47)
LYMPHOCYTES NFR BLD AUTO: 14 % (ref 14–44)
MAGNESIUM SERPL-MCNC: 2 MG/DL (ref 1.6–2.6)
MCH RBC QN AUTO: 36.9 PG (ref 26.8–34.3)
MCHC RBC AUTO-ENTMCNC: 34.1 G/DL (ref 31.4–37.4)
MCV RBC AUTO: 108 FL (ref 82–98)
MONOCYTES # BLD AUTO: 0.58 THOUSAND/ΜL (ref 0.17–1.22)
MONOCYTES NFR BLD AUTO: 6 % (ref 4–12)
NEUTROPHILS # BLD AUTO: 6.79 THOUSANDS/ΜL (ref 1.85–7.62)
NEUTS SEG NFR BLD AUTO: 75 % (ref 43–75)
NRBC BLD AUTO-RTO: 0 /100 WBCS
P AXIS: 41 DEGREES
PLATELET # BLD AUTO: 54 THOUSANDS/UL (ref 149–390)
PMV BLD AUTO: 10.5 FL (ref 8.9–12.7)
POTASSIUM SERPL-SCNC: 3.2 MMOL/L (ref 3.5–5.3)
POTASSIUM SERPL-SCNC: 3.2 MMOL/L (ref 3.5–5.3)
POTASSIUM SERPL-SCNC: 3.3 MMOL/L (ref 3.5–5.3)
PR INTERVAL: 172 MS
PROCALCITONIN SERPL-MCNC: 0.39 NG/ML
PROT SERPL-MCNC: 5.8 G/DL (ref 6.4–8.2)
PROT SERPL-MCNC: 5.9 G/DL (ref 6.4–8.2)
PROTHROMBIN TIME: 43 SECONDS (ref 11.6–14.5)
PROTHROMBIN TIME: 45.2 SECONDS (ref 11.6–14.5)
QRS AXIS: 30 DEGREES
QRS AXIS: 42 DEGREES
QRSD INTERVAL: 88 MS
QRSD INTERVAL: 92 MS
QT INTERVAL: 368 MS
QT INTERVAL: 374 MS
QTC INTERVAL: 452 MS
QTC INTERVAL: 469 MS
RBC # BLD AUTO: 2.33 MILLION/UL (ref 3.88–5.62)
SODIUM SERPL-SCNC: 133 MMOL/L (ref 136–145)
SODIUM SERPL-SCNC: 135 MMOL/L (ref 136–145)
SODIUM SERPL-SCNC: 136 MMOL/L (ref 136–145)
T WAVE AXIS: 53 DEGREES
T WAVE AXIS: 58 DEGREES
TIBC SERPL-MCNC: 83 UG/DL (ref 250–450)
VENTRICULAR RATE: 91 BPM
VENTRICULAR RATE: 95 BPM
VIT B12 SERPL-MCNC: 4349 PG/ML (ref 100–900)
WBC # BLD AUTO: 9.09 THOUSAND/UL (ref 4.31–10.16)

## 2020-09-20 PROCEDURE — 94760 N-INVAS EAR/PLS OXIMETRY 1: CPT

## 2020-09-20 PROCEDURE — 85384 FIBRINOGEN ACTIVITY: CPT | Performed by: NURSE PRACTITIONER

## 2020-09-20 PROCEDURE — 85025 COMPLETE CBC W/AUTO DIFF WBC: CPT | Performed by: NURSE PRACTITIONER

## 2020-09-20 PROCEDURE — 80048 BASIC METABOLIC PNL TOTAL CA: CPT | Performed by: NURSE PRACTITIONER

## 2020-09-20 PROCEDURE — 83540 ASSAY OF IRON: CPT | Performed by: NURSE PRACTITIONER

## 2020-09-20 PROCEDURE — 94660 CPAP INITIATION&MGMT: CPT

## 2020-09-20 PROCEDURE — 80053 COMPREHEN METABOLIC PANEL: CPT | Performed by: NURSE PRACTITIONER

## 2020-09-20 PROCEDURE — 82728 ASSAY OF FERRITIN: CPT | Performed by: NURSE PRACTITIONER

## 2020-09-20 PROCEDURE — 84145 PROCALCITONIN (PCT): CPT | Performed by: NURSE PRACTITIONER

## 2020-09-20 PROCEDURE — 82746 ASSAY OF FOLIC ACID SERUM: CPT | Performed by: NURSE PRACTITIONER

## 2020-09-20 PROCEDURE — 93010 ELECTROCARDIOGRAM REPORT: CPT | Performed by: INTERNAL MEDICINE

## 2020-09-20 PROCEDURE — 85610 PROTHROMBIN TIME: CPT | Performed by: NURSE PRACTITIONER

## 2020-09-20 PROCEDURE — 99233 SBSQ HOSP IP/OBS HIGH 50: CPT | Performed by: INTERNAL MEDICINE

## 2020-09-20 PROCEDURE — 71045 X-RAY EXAM CHEST 1 VIEW: CPT

## 2020-09-20 PROCEDURE — 83550 IRON BINDING TEST: CPT | Performed by: NURSE PRACTITIONER

## 2020-09-20 PROCEDURE — 83735 ASSAY OF MAGNESIUM: CPT | Performed by: NURSE PRACTITIONER

## 2020-09-20 PROCEDURE — 80053 COMPREHEN METABOLIC PANEL: CPT | Performed by: INTERNAL MEDICINE

## 2020-09-20 PROCEDURE — 82607 VITAMIN B-12: CPT | Performed by: NURSE PRACTITIONER

## 2020-09-20 PROCEDURE — 99232 SBSQ HOSP IP/OBS MODERATE 35: CPT | Performed by: NURSE PRACTITIONER

## 2020-09-20 PROCEDURE — 82272 OCCULT BLD FECES 1-3 TESTS: CPT | Performed by: NURSE PRACTITIONER

## 2020-09-20 RX ORDER — POTASSIUM CHLORIDE 29.8 MG/ML
40 INJECTION INTRAVENOUS ONCE
Status: COMPLETED | OUTPATIENT
Start: 2020-09-20 | End: 2020-09-21

## 2020-09-20 RX ORDER — POTASSIUM CHLORIDE 29.8 MG/ML
40 INJECTION INTRAVENOUS ONCE
Status: COMPLETED | OUTPATIENT
Start: 2020-09-20 | End: 2020-09-20

## 2020-09-20 RX ORDER — PANTOPRAZOLE SODIUM 40 MG/1
40 TABLET, DELAYED RELEASE ORAL
Status: DISCONTINUED | OUTPATIENT
Start: 2020-09-20 | End: 2020-09-21

## 2020-09-20 RX ADMIN — OCTREOTIDE ACETATE 50 MCG/HR: 500 INJECTION, SOLUTION INTRAVENOUS; SUBCUTANEOUS at 08:15

## 2020-09-20 RX ADMIN — Medication 40 MG/HR: at 06:34

## 2020-09-20 RX ADMIN — MIDODRINE HYDROCHLORIDE 10 MG: 5 TABLET ORAL at 15:28

## 2020-09-20 RX ADMIN — LACTULOSE 20 G: 10 SOLUTION ORAL at 12:47

## 2020-09-20 RX ADMIN — POTASSIUM CHLORIDE 40 MEQ: 29.8 INJECTION, SOLUTION INTRAVENOUS at 08:19

## 2020-09-20 RX ADMIN — ALBUMIN (HUMAN) 25 G: 0.25 INJECTION, SOLUTION INTRAVENOUS at 04:08

## 2020-09-20 RX ADMIN — ALBUMIN (HUMAN) 25 G: 0.25 INJECTION, SOLUTION INTRAVENOUS at 10:04

## 2020-09-20 RX ADMIN — POTASSIUM CHLORIDE 20 MEQ: 200 INJECTION, SOLUTION INTRAVENOUS at 01:59

## 2020-09-20 RX ADMIN — POTASSIUM CHLORIDE 40 MEQ: 29.8 INJECTION, SOLUTION INTRAVENOUS at 20:54

## 2020-09-20 RX ADMIN — PANTOPRAZOLE SODIUM 40 MG: 40 TABLET, DELAYED RELEASE ORAL at 12:47

## 2020-09-20 RX ADMIN — PIPERACILLIN AND TAZOBACTAM 3.38 G: 3; .375 INJECTION, POWDER, LYOPHILIZED, FOR SOLUTION INTRAVENOUS at 04:08

## 2020-09-20 RX ADMIN — MIDODRINE HYDROCHLORIDE 10 MG: 5 TABLET ORAL at 12:47

## 2020-09-20 RX ADMIN — ALBUMIN (HUMAN) 25 G: 0.25 INJECTION, SOLUTION INTRAVENOUS at 15:37

## 2020-09-20 RX ADMIN — RIFAXIMIN 550 MG: 550 TABLET ORAL at 20:46

## 2020-09-20 RX ADMIN — NOREPINEPHRINE BITARTRATE 4 MCG/MIN: 1 INJECTION INTRAVENOUS at 00:18

## 2020-09-20 RX ADMIN — PHYTONADIONE 10 MG: 10 INJECTION, EMULSION INTRAMUSCULAR; INTRAVENOUS; SUBCUTANEOUS at 11:37

## 2020-09-20 RX ADMIN — LACTULOSE 20 G: 10 SOLUTION ORAL at 17:16

## 2020-09-20 RX ADMIN — ALBUMIN (HUMAN) 25 G: 0.25 INJECTION, SOLUTION INTRAVENOUS at 22:35

## 2020-09-20 RX ADMIN — OCTREOTIDE ACETATE 50 MCG/HR: 500 INJECTION, SOLUTION INTRAVENOUS; SUBCUTANEOUS at 17:16

## 2020-09-20 RX ADMIN — POTASSIUM CHLORIDE 40 MEQ: 29.8 INJECTION, SOLUTION INTRAVENOUS at 04:25

## 2020-09-20 RX ADMIN — RIFAXIMIN 550 MG: 550 TABLET ORAL at 12:47

## 2020-09-20 RX ADMIN — MIDODRINE HYDROCHLORIDE 10 MG: 5 TABLET ORAL at 06:17

## 2020-09-20 RX ADMIN — POTASSIUM CHLORIDE 40 MEQ: 29.8 INJECTION, SOLUTION INTRAVENOUS at 13:28

## 2020-09-20 NOTE — PROGRESS NOTES
Progress Note - Fadi Collier 1955, 76 y o  male MRN: [id number]    Unit/Bed#: -01 Encounter: [id number]    Primary Care Provider: No primary care provider on file     Date and time admitted to hospital: 9/17/2020  1:08 PM        * Shock [facility])  Assessment & Plan  · Secondary to low oncotic pressure in setting of cirrhosis vs sepsis PNA  · CT chest - b/l GGO with R>L pleural effusions  · UA - positive nitrites only, does not appear to be UTI  · Blood culture - 1/2 staph coag, repeat NG24  · S/p paracentesis - low WBC count, does not appear infectious  · Procalcitonin 1 0, 0 92  · Pt with refractory hypotension following paracentesis, central line and arterial line placed, levophed started  · Cont Levo with goal MAP >65  · Cont Zosyn- pt with allergy to cephalosporins  · Cont scheduled Albumin and Midodrine    Acute respiratory failure with hypoxia (HCC)  Assessment & Plan  · Secondary to volume overload in setting of sepsis IVF administration with known decompensated cirrhosis  · Continues to require Bipap for persistent hypoxia 14/8 50%  · Trial HFNC again today  · Cont diuresis with Lasix infusion  · CT chest - R>L pleural effusions, may need thoracentesis if no improvement with diuresis   · Cont Atrovent/Xopenex and Mucinex  · Goal SpO2 >92%  · If bed at [facility] becomes available pt will need to be intubated prior to transport, pt has history of difficult intubation, will need anesthesia at bedside    MILLER with portal HTN/ascites/varicies  Assessment & Plan  · Pt sees Dr Lamin Webb of [facility] GI as outpatient  · PT is scheduled for transplant evaluation with Dr Jake Torres on 9/21  · MELD 35 - consistent with 52% 3 month mortality  · Pt accepted at [facility] by hepatologist and critical care - awaiting available bed  · GI following  · Cont lactulose and Rifaximin  · Cont Octreotide   · Cont SBP ABX Zosyn as pt was admitted with signs of sepsis  · 9/18 S/p paracentesis with 3 2L fluid removed, culture pending    Hyponatremia  Assessment & Plan  · Secondary to cirrhosis  · Urine Na 44  · Nephrology following  · Cont diuresis with Lasix    Thrombocytopenia (HCC)  Assessment & Plan  · Secondary to decompensated cirrhosis  · Cont to trend plt  · Transfuse if <20 or signs of active bleeding     Coagulopathy (HCC)  Assessment & Plan  · 2/2 liver failure  · INR 3 0 > 3 8 > 3 7  · S/p 2 u FFP in the ED  · Cont to trend INR  · No active bleeding     Hyperbilirubinemia  Assessment & Plan  · 2/2 advanced liver disease  · Cont to trend LFT's     SAMANTHA (acute kidney injury) ([facility] [address] )  Assessment & Plan  · Baseline creatinine 1 08  · Creatinine prior trending 1 8 - 1 9, improved this am 1 5  · Likely secondary to hepatorenal failure  · Nephrology following  · Cont Lasix infusion, started on Metolazone daily - cont  · Cont Midodrine and Levo with goal MAP >65  · Avoid nephrotoxic medications    Anemia  Assessment & Plan  · Unknown baseline, Hgb on admission 12, now trending 9 s/p IVF resuscitation   · Cont to monitor  · Transfuse if <7, caution with transfusion as pt is transplant candidate     [facility]-obstruct[facility]  Assessment & Plan  · Pt with mitral murmur on initial exam  · Previous Echo with severe non-obstructive hypertrophic cardiomyopathy - EF 70%  · Repeat echo - EF 60%, G2DD, mod MVR, mild AS, no regional wall abnormalities  · Hold Nadolol due to hypotension - avoid tachycardia     Abnormal CT scan of head  Assessment & Plan  · In the ED - CT head 3mm questionable small SAH  · Neurosurgery reviewed CT, feels this is artifact  · No further imaging or work up warranted     Accidental fall  Assessment & Plan  · Pt with controlled fall to ground  · No LOC  · Head ct read as "possible small subarachnoid hemorrhage" - Discussion with neurosurgery -  They feel it is artifact  · Neuro exams  · No significant fractures on imaging  · Left thigh ecchymosis and swelling - improved since admission ----------------------------------------------------------------------------------------  HPI/24hr events: Pt trialed on HFNC however was persistenly hypoxic, continues to require Bipap, tolerated overnight  Given metolazone yesterday and continued on Lasix infusion, diuresing well  Levophed infusion 1-5 mcg       Disposition: Continue Critical Care   Code Status: Level 1 - Full Code  ---------------------------------------------------------------------------------------  SUBJECTIVE  Pt c/o dry mouth and thirst     Review of Systems  Review of systems was unable to be performed secondary to on Bipap  ---------------------------------------------------------------------------------------  OBJECTIVE    Vitals   Vitals:    20 0145 20 0200 20 0215 20 0242   BP:  113/67     BP Location:       Pulse: 70 80 71    Resp: 14 17 15    Temp:    98 3 °F (36 8 °C)   TempSrc:    Axillary   SpO2: 96% 95% 97%    Weight:       Height:         Temp (24hrs), Av 7 °F (36 5 °C), Min:97 5 °F (36 4 °C), Max:98 3 °F (36 8 °C)  Current: Temperature: 98 3 °F (36 8 °C)  Arterial Line BP: 120/44  Arterial Line MAP (mmHg): 66 mmHg    Respiratory:  SpO2: SpO2: 97 %, SpO2 Device: O2 Device: High flow nasal cannula  Nasal Cannula O2 Flow Rate (L/min): 10 L/min    Invasive/non-invasive ventilation settings   Respiratory    Lab Data (Last 4 hours)    None         O2/Vent Data (Last 4 hours)       0438          Non-Invasive Ventilation Mode BiPAP                   Physical Exam    Laboratory and Diagnostics:  Results from last 7 days   Lab Units 20  0845 20  0505 20  1434 20  0656 20  0508 20  1846 20  1320   WBC Thousand/uL  --  10 29*  --   --  13 40*  --  15 48*   HEMOGLOBIN g/dL  --  8 2*  --  9 4* 9 4*  --  12 1   I STAT HEMOGLOBIN g/dl 8 5*  --  9 2*  --   --   --   --    HEMATOCRIT %  --  24 4*  --  26 6* 27 1*  --  34 9*   HEMATOCRIT, ISTAT % 25*  --  27*  -- --   --   --    PLATELETS Thousands/uL  --  56*  --   --  57* 63* 82*   NEUTROS PCT %  --   --   --   --  75  --   --    BANDS PCT %  --   --   --   --   --   --  5   MONOS PCT %  --   --   --   --  8  --   --    MONO PCT %  --   --   --   --   --   --  4     Results from last 7 days   Lab Units 09/20/20  0202 09/19/20  1804 09/19/20  1129 09/19/20  0845 09/19/20  0505 09/18/20  2221 09/18/20  1616  09/18/20  0508  09/17/20  1320   SODIUM mmol/L 133* 132* 132*  --  131* 128* 128*  --  127*   < > 124*   POTASSIUM mmol/L 3 2* 3 6 3 5  --  3 9 4 4 4 3  --  4 7   < > 5 0   CHLORIDE mmol/L 96* 97* 97*  --  98* 97* 98*  --  98*   < > 94*   CO2 mmol/L 29 24 23  --  23 21 24  --  23   < > 21   CO2, I-STAT mmol/L  --   --   --  23  --   --   --    < >  --   --   --    ANION GAP mmol/L 8 11 12  --  10 10 6  --  6   < > 9   BUN mg/dL 40* 43* 44*  --  41* 43* 43*  --  38*   < > 36*   CREATININE mg/dL 1 57* 1 91* 1 84*  --  1 84* 1 93* 1 98*  --  1 89*   < > 1 97*   CALCIUM mg/dL 7 9* 7 5* 7 7*  --  8 0* 7 5* 7 6*  --  7 9*   < > 8 1*   GLUCOSE RANDOM mg/dL 147* 152* 126  --  120 111 102  --  58*   < > 75   ALT U/L  --   --   --   --  67  --   --   --  71  --  99*   AST U/L  --   --   --   --  163*  --   --   --  173*  --  238*   ALK PHOS U/L  --   --   --   --  211*  --   --   --  122*  --  144*   ALBUMIN g/dL  --   --   --   --  2 6*  --   --   --  1 1*  --  1 4*   TOTAL BILIRUBIN mg/dL  --   --   --   --  11 30*  --   --   --  7 70*  --  9 50*    < > = values in this interval not displayed       Results from last 7 days   Lab Units 09/19/20  1129 09/18/20  2221 09/18/20  0508 09/17/20  1320   MAGNESIUM mg/dL 2 2 1 9 1 9 2 1   PHOSPHORUS mg/dL  --   --  3 9  --       Results from last 7 days   Lab Units 09/19/20  0505 09/18/20  0656 09/18/20  0508 09/17/20  1320   INR  3 76* 3 82* 3 07* 3 44*   PTT seconds  --   --   --  75*      Results from last 7 days   Lab Units 09/18/20  1616 09/18/20  0508 09/18/20  0014 09/17/20 2058 09/17/20  1846 09/17/20  1320   TROPONIN I ng/mL 0 86* 0 61* 0 42* 0 38* 0 31* 0 29*     Results from last 7 days   Lab Units 09/18/20  1616 09/18/20  0508 09/18/20  0014 09/17/20  1536 09/17/20  1335   LACTIC ACID mmol/L 2 4* 2 9* 3 1* 5 1* 5 8*     ABG:    VBG:    Results from last 7 days   Lab Units 09/19/20  0505 09/18/20  0508   PROCALCITONIN ng/ml 0 92* 1 03*       Micro  Results from last 7 days   Lab Units 09/18/20  1617 09/18/20  1610 09/18/20  1420 09/17/20  1537 09/17/20  1406 09/17/20  1335   BLOOD CULTURE  No Growth at 24 hrs  No Growth at 24 hrs   --   --  No Growth at 48 hrs  Staphylococcus coagulase negative*   GRAM STAIN RESULT   --   --  1+ Polys  No bacteria seen  --   --  Gram positive cocci in clusters*   URINE CULTURE   --   --   --  No Growth <1000 cfu/mL  --   --    BODY FLUID CULTURE, STERILE   --   --  No growth  --   --   --        EKG: NSR HR 80's  Imaging: I have personally reviewed pertinent reports  Intake and Output  I/O       09/18 0701 - 09/19 0700 09/19 0701 - 09/20 0700    P  O  356 560    I V  (mL/kg) 328 7 (2 7) 696 6 (5 8)    IV Piggyback 750 600    Total Intake(mL/kg) 1434 7 (12) 1856 6 (15 5)    Urine (mL/kg/hr) 2110 (0 7) 6900 (2 4)    Other 3200     Stool  5    Total Output 5310 6905    Net -3875 3 -3357  4          Unmeasured Stool Occurrence  5 x          Height and Weights   Height: 5' 10" (177 8 cm)  IBW: 73 kg  Body mass index is 38 02 kg/m²  Weight (last 2 days)     Date/Time   Weight    09/19/20 0600   120 (264 99)    09/18/20 0600   120 (265 21)                Nutrition       Diet Orders   (From admission, onward)             Start     Ordered    09/18/20 0746  Diet NPO; Sips with meds  Diet effective now     Question Answer Comment   Diet Type NPO    NPO Except: Sips with meds    RD to adjust diet per protocol?  Yes        09/18/20 0021                  Active Medications  Scheduled Meds:  Current Facility-Administered Medications   Medication Dose Route Frequency Provider Last Rate    albumin human  25 g Intravenous Q6H Missael Augustin CRNP Stopped (09/20/20 0429)    albuterol  2 5 mg Nebulization Q4H PRN Alessandra Martinez MD      furosemide  40 mg/hr Intravenous Continuous Missael Augustin CRNP 40 mg/hr (09/19/20 1804)    lactulose  20 g Oral BID ELIZABETH Campbell      metolazone  5 mg Oral Daily ELIZABETH Castillo      midodrine  10 mg Oral TID AC ELIZABETH Tidwell      norepinephrine  1-30 mcg/min Intravenous Titrated Missael Augustin CRNP 4 mcg/min (09/20/20 0018)    octreotide  50 mcg/hr Intravenous Continuous ELIZABETH Castillo 50 mcg/hr (09/19/20 2033)    piperacillin-tazobactam  3 375 g Intravenous Tita Gallardo MD 3 375 g (09/20/20 0408)    potassium chloride  40 mEq Intravenous Once Sarah A Ramella, JORGENP 40 mEq (09/20/20 0425)    potassium chloride  40 mEq Intravenous Once Sarah A RamellaELIZABETH      rifaximin  550 mg Oral Q12H [address] ELIZABETH Campbell       Continuous Infusions:  furosemide, 40 mg/hr, Last Rate: 40 mg/hr (09/19/20 1804)  norepinephrine, 1-30 mcg/min, Last Rate: 4 mcg/min (09/20/20 0018)  octreotide, 50 mcg/hr, Last Rate: 50 mcg/hr (09/19/20 2033)      PRN Meds:   albuterol, 2 5 mg, Q4H PRN        Invasive Devices Review  Invasive Devices     Central Venous Catheter Line            CVC Central Lines 09/18/20 1 day          Peripheral Intravenous Line            Peripheral IV 09/17/20 Right Antecubital 2 days          Arterial Line            Arterial Line 09/18/20 Left Radial 1 day          Drain            Urethral Catheter Double-lumen 16 Fr  1 day                Rationale for remaining devices: central line - meds requiring  Arterial line - HD monitoring  Roberts - accurate I/O on Lasix infusion  ---------------------------------------------------------------------------------------  Advance Directive and Living Will:      Power of :    POLST: ---------------------------------------------------------------------------------------  Care Time Delivered:   No Critical Care time spent       [facility]      Portions of the record may have been created with voice recognition software  Occasional wrong word or "sound a like" substitutions may have occurred due to the inherent limitations of voice recognition software    Read the chart carefully and recognize, using context, where substitutions have occurred

## 2020-09-20 NOTE — ASSESSMENT & PLAN NOTE
· 2/2 liver failure  · INR 3 0 > 3 8 > 3 7  · S/p 2 u FFP in the ED  · Cont to trend INR  · No active bleeding

## 2020-09-20 NOTE — ASSESSMENT & PLAN NOTE
· Pt sees Dr Erica Chandler of [facility] GI as outpatient  · PT is scheduled for transplant evaluation with Dr Michelle Valentino on 9/21  · MELD 35 - consistent with 52% 3 month mortality  · Pt accepted at [facility] by hepatologist and critical care - awaiting available bed  · GI following  · Cont lactulose and Rifaximin  · Cont Octreotide   · Cont SBP ABX Zosyn as pt was admitted with signs of sepsis  · 9/18 S/p paracentesis with 3 2L fluid removed, culture pending

## 2020-09-20 NOTE — ASSESSMENT & PLAN NOTE
· Baseline creatinine 1 08  · Creatinine prior trending 1 8 - 1 9, improved this am 1 5  · Likely secondary to hepatorenal failure  · Nephrology following  · Cont Lasix infusion, started on Metolazone daily - cont  · Cont Midodrine and Levo with goal MAP >65  · Avoid nephrotoxic medications

## 2020-09-20 NOTE — ASSESSMENT & PLAN NOTE
· Secondary to volume overload in setting of sepsis IVF administration with known decompensated cirrhosis  · Continues to require Bipap for persistent hypoxia 14/8 50%  · Trial HFNC again today  · Cont diuresis with Lasix infusion  · CT chest - R>L pleural effusions, may need thoracentesis if no improvement with diuresis   · Cont Atrovent/Xopenex and Mucinex  · Goal SpO2 >92%  · If bed at [facility] becomes available pt will need to be intubated prior to transport, pt has history of difficult intubation, will need anesthesia at bedside

## 2020-09-20 NOTE — PROGRESS NOTES
Progress note - Gastroenterology   Michelle Whitehead 76 y o  male MRN: [id number]  Unit/Bed#: -01 Encounter: [id number]    ASSESSMENT and PLAN    1  MILLER cirrhosis, MELD 39, decompensated by hepatic encephalopathy, coagulopathy, ascites, thrombocytopenia, esophageal varices admitted with sepsis like picture, hypotensive and in acute respiratory failure with lactic acidosis  Volume overloaded, on diuretics with good urinary output  Paracentesis negative for SBP  Ultrasound negative for HCC  CT negative for portal vein thrombus  2  Hypoxic respiratory failure  3  Chronic liver disease in acute liver failure      Worsening coagulopathy  INR 4 89 today  Patient [facility]   However, there are no beds to accept the transfer  Awaiting a bed, and in the meantime agree with supportive care per ICU team   Continue antibiotics  Okay to hold the lactulose given the NPO status on BiPAP  Continue lasix and monitor urine output  Prognosis is very guarded  Made another call out to [facility] today to give them an update, awaiting a bed  Chief Complaint   Patient presents with    Weakness - Generalized     patient complaint of "loosing balance and guided self to the floor,landed on buttocks, denies loc, denies head injury, increasing weakness last month       SUBJECTIVE/HPI   C/o dry mouth and thirsty on bipap  desats off bipap      BP 94/70   Pulse 82   Temp 98 2 °F (36 8 °C) (Oral)   Resp (!) 25   Ht 5' 10" (1 778 m)   Wt 118 kg (260 lb 5 8 oz)   SpO2 (!) 89%   BMI 37 36 kg/m²     PHYSICALEXAM  General appearance: alert, appears stated age and cooperative  Eyes: ALIN, EOMI, no scleral icterus   Head: Normocephalic, without obvious abnormality, atraumatic  Lungs: clear to auscultation bilaterally, no c/w/r  Heart: regular rate and rhythm, S1, S2 normal, no murmur, click, rub or gallop  Abdomen: soft, non-tender, non-distended; bowel sounds normal; no masses,  no organomegaly  Extremities: extremities normal, atraumatic, no clubbing or cyanosis   No LE edema  Neurologic: Grossly normal, AAOx3, no asterixis    Lab Results   Component Value Date    GLUCOSE 117 09/19/2020    CALCIUM 7 7 (L) 09/20/2020    K 3 3 (L) 09/20/2020    CO2 30 09/20/2020    CL 95 (L) 09/20/2020    BUN 39 (H) 09/20/2020    CREATININE 1 56 (H) 09/20/2020     Lab Results   Component Value Date    WBC 9 09 09/20/2020    HGB 8 6 (L) 09/20/2020    HCT 25 2 (L) 09/20/2020     (H) 09/20/2020    PLT 54 (L) 09/20/2020     Lab Results   Component Value Date    ALT 71 09/20/2020     (H) 09/20/2020    ALKPHOS 92 09/20/2020     No results found for: AMYLASE  No results found for: LIPASE  No results found for: IRON, TIBC, FERRITIN  Lab Results   Component Value Date    INR 4 89 (H) 09/20/2020

## 2020-09-20 NOTE — PROGRESS NOTES
NEPHROLOGY PROGRESS NOTE    Luis Miguel Srinivasan 76 y o  male MRN: [id number]  Unit/Bed#: -01 Encounter: [id number]  Reason for Consult:  Acute renal failure    The patient looks little better today he was sitting up in a chair and stated that he was able to eat a little bit  He had his are at the bedside as well  He was off noninvasive ventilation but on high-flow nasal cannula oxygen  I spoke to the ICU team and patient put out a lot a urine edema is improving  ASSESSMENT/PLAN:  1  Renal     Patient acute renal failure likely due to hepatorenal syndrome and effective volume changes due to his advanced cirrhotic liver disease  Creatinine is actually lower at 1 5 and he has been diuresed with negative fluid balance  Presently he is on midodrine  He is no longer on octreotide  He is on Lasix infusion the rate was cut in half  Hypokalemia present likely due to excessive urinary losses from diuresis  ICU team has ordered repletion is following  Continue current treatment plan is renal function is tolerating this and monitor volume status  2  Hyponatremia    Due to cirrhotic liver disease resulting in ADH stimulation and renal insufficiency this is likely to have developed  It is improved diuresis no changes  3  Cirrhosis of the liver    The patient cirrhotic liver disease is awaiting transfer to a [facility] for [facility] evaluation  GI is following  SUBJECTIVE:  Review of Systems   Constitution: Positive for malaise/fatigue  Negative for chills, fever and night sweats  I feel better today   HENT: Negative  Eyes: Negative  Cardiovascular: Positive for leg swelling  Negative for chest pain, orthopnea and palpitations  Patient notices that it is improving  Respiratory: Negative  Negative for cough, shortness of breath, sputum production and wheezing  Gastrointestinal: Negative for abdominal pain, diarrhea, nausea and vomiting          He has told me he ate today    Genitourinary: Roberts catheter in  No complaints  Neurological: Positive for weakness  Negative for dizziness, focal weakness, headaches and light-headedness  Psychiatric/Behavioral: Negative  Negative for altered mental status, depression, hallucinations and hypervigilance  OBJECTIVE:  Current Weight: Weight - Scale: 118 kg (260 lb 5 8 oz)  Vitals:Temp (24hrs), Av 9 °F (36 6 °C), Min:97 5 °F (36 4 °C), Max:98 5 °F (36 9 °C)  Current: Temperature: 97 5 °F (36 4 °C)   Blood pressure 94/70, pulse 86, temperature 97 5 °F (36 4 °C), temperature source Axillary, resp  rate 20, height 5' 10" (1 778 m), weight 118 kg (260 lb 5 8 oz), SpO2 90 %  , Body mass index is 37 36 kg/m²  Intake/Output Summary (Last 24 hours) at 2020 1418  Last data filed at 2020 1000  Gross per 24 hour   Intake 2204 36 ml   Output 66075 ml   Net -8172 64 ml       Physical Exam: BP 94/70   Pulse 86   Temp 97 5 °F (36 4 °C) (Axillary)   Resp 20   Ht 5' 10" (1 778 m)   Wt 118 kg (260 lb 5 8 oz)   SpO2 90%   BMI 37 36 kg/m²   Physical Exam  Constitutional:       General: He is not in acute distress  Appearance: He is ill-appearing  He is not diaphoretic  HENT:      Head: Normocephalic and atraumatic  Mouth/Throat:      Mouth: Mucous membranes are dry  Eyes:      General: Scleral icterus present  Extraocular Movements: Extraocular movements intact  Neck:      Musculoskeletal: Normal range of motion and neck supple  Cardiovascular:      Rate and Rhythm: Normal rate and regular rhythm  Heart sounds: No friction rub  No gallop  Comments: Positive edema  Pulmonary:      Effort: Pulmonary effort is normal  No respiratory distress  Breath sounds: No wheezing, rhonchi or rales  Comments: Wearing high-flow nasal cannula oxygen  Decreased breath sounds bases  Abdominal:      General: Bowel sounds are normal  There is no distension  Palpations: Abdomen is soft  Tenderness: There is no abdominal tenderness  There is no rebound  Neurological:      General: No focal deficit present  Mental Status: He is alert and oriented to person, place, and time  Psychiatric:         Mood and Affect: Mood normal          Behavior: Behavior normal          Thought Content:  Thought content normal          Medications:    Current Facility-Administered Medications:     albumin human (FLEXBUMIN) 25 % injection 25 g, 25 g, Intravenous, Q6H, JORGE CastilloNP, Last Rate: 0 mL/hr at 09/20/20 0429, 25 g at 09/20/20 1004    albuterol inhalation solution 2 5 mg, 2 5 mg, Nebulization, Q4H PRN, Aaron Roque MD    furosemide (LASIX) 500 mg infusion 50 mL, 20 mg/hr, Intravenous, Continuous, ELIZABETH Castillo, Last Rate: 2 mL/hr at 09/20/20 0815, 20 mg/hr at 09/20/20 0815    lactulose 20 g/30 mL oral solution 20 g, 20 g, Oral, BID, Tiny Gil, JORGENP, 20 g at 09/20/20 1247    midodrine (PROAMATINE) tablet 10 mg, 10 mg, Oral, TID AC, Lizzette Case, CRNP, 10 mg at 09/20/20 1247    norepinephrine (LEVOPHED) 4 mg (STANDARD CONCENTRATION) IV in sodium chloride 0 9% 250 mL, 1-30 mcg/min, Intravenous, Titrated, JORGE CastilloNP, Stopped at 09/20/20 1153    octreotide (SandoSTATIN) 500 mcg in sodium chloride 0 9 % 250 mL infusion, 50 mcg/hr, Intravenous, Continuous, JORGE CastilloNP, Last Rate: 25 mL/hr at 09/20/20 0815, 50 mcg/hr at 09/20/20 0815    pantoprazole (PROTONIX) EC tablet 40 mg, 40 mg, Oral, Early Morning, TabithaJORGE RosalesNP, 40 mg at 09/20/20 1247    potassium chloride 40 mEq IVPB (premix), 40 mEq, Intravenous, Once, [employer], CRNP, Last Rate: 25 mL/hr at 09/20/20 1328, 40 mEq at 09/20/20 1328    rifaximin (XIFAXAN) tablet 550 mg, 550 mg, Oral, Q12H Tiny AMEZQUITA CRNP, 550 mg at 09/20/20 1247    Laboratory Results:  Lab Results   Component Value Date    WBC 9 09 09/20/2020    HGB 8 6 (L) 09/20/2020    HCT 25 2 (L) 09/20/2020     (H) 09/20/2020    PLT 54 (L) 09/20/2020     Lab Results   Component Value Date    SODIUM 135 (L) 09/20/2020    K 3 3 (L) 09/20/2020    CL 95 (L) 09/20/2020    CO2 30 09/20/2020    BUN 40 (H) 09/20/2020    CREATININE 1 60 (H) 09/20/2020    GLUC 130 09/20/2020    CALCIUM 8 0 (L) 09/20/2020     Lab Results   Component Value Date    CALCIUM 8 0 (L) 09/20/2020    PHOS 3 9 09/18/2020     No results found for: LABPROT

## 2020-09-20 NOTE — ASSESSMENT & PLAN NOTE
· Secondary to low oncotic pressure in setting of cirrhosis vs sepsis PNA  · CT chest - b/l GGO with R>L pleural effusions  · UA - positive nitrites only, does not appear to be UTI  · Blood culture - 1/2 staph coag, repeat NG24  · S/p paracentesis - low WBC count, does not appear infectious  · Procalcitonin 1 0, 0 92  · Pt with refractory hypotension following paracentesis, central line and arterial line placed, levophed started  · Cont Levo with goal MAP >65  · Cont Zosyn- pt with allergy to cephalosporins  · Cont scheduled Albumin and Midodrine

## 2020-09-21 ENCOUNTER — APPOINTMENT (INPATIENT)
Dept: NON INVASIVE DIAGNOSTICS | Facility: HOSPITAL | Age: 65
DRG: 871 | End: 2020-09-21
Payer: MEDICARE

## 2020-09-21 ENCOUNTER — APPOINTMENT (INPATIENT)
Dept: RADIOLOGY | Facility: HOSPITAL | Age: 65
DRG: 871 | End: 2020-09-21
Payer: MEDICARE

## 2020-09-21 PROBLEM — E87.6 HYPOKALEMIA: Status: ACTIVE | Noted: 2020-09-21

## 2020-09-21 PROBLEM — E83.42 HYPOMAGNESEMIA: Status: ACTIVE | Noted: 2020-09-21

## 2020-09-21 LAB
ALBUMIN SERPL BCP-MCNC: 3.6 G/DL (ref 3.5–5)
ALP SERPL-CCNC: 83 U/L (ref 46–116)
ALT SERPL W P-5'-P-CCNC: 59 U/L (ref 12–78)
ANION GAP SERPL CALCULATED.3IONS-SCNC: 7 MMOL/L (ref 4–13)
ANION GAP SERPL CALCULATED.3IONS-SCNC: 7 MMOL/L (ref 4–13)
ANION GAP SERPL CALCULATED.3IONS-SCNC: 8 MMOL/L (ref 4–13)
ANION GAP SERPL CALCULATED.3IONS-SCNC: 9 MMOL/L (ref 4–13)
ARTERIAL PATENCY WRIST A: ABNORMAL
AST SERPL W P-5'-P-CCNC: 137 U/L (ref 5–45)
BACTERIA BLD CULT: ABNORMAL
BACTERIA SPEC BFLD CULT: NO GROWTH
BASE EXCESS BLDA CALC-SCNC: 10 MMOL/L (ref -2–3)
BASOPHILS # BLD AUTO: 0.04 THOUSANDS/ΜL (ref 0–0.1)
BASOPHILS NFR BLD AUTO: 0 % (ref 0–1)
BILIRUB SERPL-MCNC: 16.8 MG/DL (ref 0.2–1)
BUN SERPL-MCNC: 35 MG/DL (ref 5–25)
BUN SERPL-MCNC: 36 MG/DL (ref 5–25)
BUN SERPL-MCNC: 38 MG/DL (ref 5–25)
BUN SERPL-MCNC: 43 MG/DL (ref 5–25)
CA-I BLD-SCNC: 1.14 MMOL/L (ref 1.12–1.32)
CALCIUM SERPL-MCNC: 8.2 MG/DL (ref 8.3–10.1)
CALCIUM SERPL-MCNC: 8.2 MG/DL (ref 8.3–10.1)
CALCIUM SERPL-MCNC: 8.3 MG/DL (ref 8.3–10.1)
CALCIUM SERPL-MCNC: 8.8 MG/DL (ref 8.3–10.1)
CHLORIDE SERPL-SCNC: 95 MMOL/L (ref 100–108)
CHLORIDE SERPL-SCNC: 95 MMOL/L (ref 100–108)
CHLORIDE SERPL-SCNC: 96 MMOL/L (ref 100–108)
CHLORIDE SERPL-SCNC: 98 MMOL/L (ref 100–108)
CO2 SERPL-SCNC: 29 MMOL/L (ref 21–32)
CO2 SERPL-SCNC: 31 MMOL/L (ref 21–32)
CO2 SERPL-SCNC: 32 MMOL/L (ref 21–32)
CO2 SERPL-SCNC: 32 MMOL/L (ref 21–32)
CREAT SERPL-MCNC: 1.27 MG/DL (ref 0.6–1.3)
CREAT SERPL-MCNC: 1.38 MG/DL (ref 0.6–1.3)
CREAT SERPL-MCNC: 1.41 MG/DL (ref 0.6–1.3)
CREAT SERPL-MCNC: 1.42 MG/DL (ref 0.6–1.3)
EOSINOPHIL # BLD AUTO: 0.22 THOUSAND/ΜL (ref 0–0.61)
EOSINOPHIL NFR BLD AUTO: 3 % (ref 0–6)
ERYTHROCYTE [DISTWIDTH] IN BLOOD BY AUTOMATED COUNT: 17.1 % (ref 11.6–15.1)
FIBRINOGEN PPP-MCNC: <60 MG/DL (ref 227–495)
FIO2 GAS DIL.REBREATH: 65 L
GFR SERPL CREATININE-BSD FRML MDRD: 51 ML/MIN/1.73SQ M
GFR SERPL CREATININE-BSD FRML MDRD: 52 ML/MIN/1.73SQ M
GFR SERPL CREATININE-BSD FRML MDRD: 53 ML/MIN/1.73SQ M
GFR SERPL CREATININE-BSD FRML MDRD: 58 ML/MIN/1.73SQ M
GLUCOSE SERPL-MCNC: 108 MG/DL (ref 65–140)
GLUCOSE SERPL-MCNC: 113 MG/DL (ref 65–140)
GLUCOSE SERPL-MCNC: 122 MG/DL (ref 65–140)
GLUCOSE SERPL-MCNC: 159 MG/DL (ref 65–140)
GLUCOSE SERPL-MCNC: 191 MG/DL (ref 65–140)
GRAM STN SPEC: ABNORMAL
GRAM STN SPEC: NORMAL
GRAM STN SPEC: NORMAL
HCO3 BLDA-SCNC: 33.1 MMOL/L (ref 22–28)
HCT VFR BLD AUTO: 24.7 % (ref 36.5–49.3)
HCT VFR BLD CALC: 34 % (ref 36.5–49.3)
HGB BLD-MCNC: 8.7 G/DL (ref 12–17)
HGB BLDA-MCNC: 11.6 G/DL (ref 12–17)
IMM GRANULOCYTES # BLD AUTO: 0.21 THOUSAND/UL (ref 0–0.2)
IMM GRANULOCYTES NFR BLD AUTO: 2 % (ref 0–2)
INR PPP: 4.19 (ref 0.84–1.19)
LYMPHOCYTES # BLD AUTO: 1.73 THOUSANDS/ΜL (ref 0.6–4.47)
LYMPHOCYTES NFR BLD AUTO: 19 % (ref 14–44)
MAGNESIUM SERPL-MCNC: 1.5 MG/DL (ref 1.6–2.6)
MCH RBC QN AUTO: 37.7 PG (ref 26.8–34.3)
MCHC RBC AUTO-ENTMCNC: 35.2 G/DL (ref 31.4–37.4)
MCV RBC AUTO: 107 FL (ref 82–98)
MISCELLANEOUS LAB TEST RESULT: NORMAL
MONOCYTES # BLD AUTO: 0.63 THOUSAND/ΜL (ref 0.17–1.22)
MONOCYTES NFR BLD AUTO: 7 % (ref 4–12)
NEUTROPHILS # BLD AUTO: 6.11 THOUSANDS/ΜL (ref 1.85–7.62)
NEUTS SEG NFR BLD AUTO: 69 % (ref 43–75)
NRBC BLD AUTO-RTO: 0 /100 WBCS
PCO2 BLD: 34 MMOL/L (ref 21–32)
PCO2 BLD: 38.8 MM HG (ref 36–44)
PH BLD: 7.54 [PH] (ref 7.35–7.45)
PLATELET # BLD AUTO: 44 THOUSANDS/UL (ref 149–390)
PMV BLD AUTO: 10.1 FL (ref 8.9–12.7)
PO2 BLD: 72 MM HG (ref 75–129)
POTASSIUM BLD-SCNC: 3.2 MMOL/L (ref 3.5–5.3)
POTASSIUM SERPL-SCNC: 3.2 MMOL/L (ref 3.5–5.3)
POTASSIUM SERPL-SCNC: 3.5 MMOL/L (ref 3.5–5.3)
POTASSIUM SERPL-SCNC: 3.5 MMOL/L (ref 3.5–5.3)
POTASSIUM SERPL-SCNC: 3.8 MMOL/L (ref 3.5–5.3)
PROCALCITONIN SERPL-MCNC: 0.39 NG/ML
PROT SERPL-MCNC: 5.8 G/DL (ref 6.4–8.2)
PROTHROMBIN TIME: 40.1 SECONDS (ref 11.6–14.5)
RBC # BLD AUTO: 2.31 MILLION/UL (ref 3.88–5.62)
SAMPLE SITE: ABNORMAL
SAO2 % BLD FROM PO2: 96 % (ref 60–85)
SODIUM BLD-SCNC: 134 MMOL/L (ref 136–145)
SODIUM SERPL-SCNC: 133 MMOL/L (ref 136–145)
SODIUM SERPL-SCNC: 135 MMOL/L (ref 136–145)
SODIUM SERPL-SCNC: 135 MMOL/L (ref 136–145)
SODIUM SERPL-SCNC: 136 MMOL/L (ref 136–145)
SPECIMEN SOURCE: ABNORMAL
WBC # BLD AUTO: 8.94 THOUSAND/UL (ref 4.31–10.16)

## 2020-09-21 PROCEDURE — 85025 COMPLETE CBC W/AUTO DIFF WBC: CPT | Performed by: NURSE PRACTITIONER

## 2020-09-21 PROCEDURE — 94760 N-INVAS EAR/PLS OXIMETRY 1: CPT

## 2020-09-21 PROCEDURE — 80048 BASIC METABOLIC PNL TOTAL CA: CPT | Performed by: NURSE PRACTITIONER

## 2020-09-21 PROCEDURE — 71045 X-RAY EXAM CHEST 1 VIEW: CPT

## 2020-09-21 PROCEDURE — 83735 ASSAY OF MAGNESIUM: CPT | Performed by: NURSE PRACTITIONER

## 2020-09-21 PROCEDURE — 82947 ASSAY GLUCOSE BLOOD QUANT: CPT

## 2020-09-21 PROCEDURE — 99232 SBSQ HOSP IP/OBS MODERATE 35: CPT | Performed by: NURSE PRACTITIONER

## 2020-09-21 PROCEDURE — 94660 CPAP INITIATION&MGMT: CPT

## 2020-09-21 PROCEDURE — 85610 PROTHROMBIN TIME: CPT | Performed by: NURSE PRACTITIONER

## 2020-09-21 PROCEDURE — 85384 FIBRINOGEN ACTIVITY: CPT | Performed by: NURSE PRACTITIONER

## 2020-09-21 PROCEDURE — 99233 SBSQ HOSP IP/OBS HIGH 50: CPT | Performed by: INTERNAL MEDICINE

## 2020-09-21 PROCEDURE — 36600 WITHDRAWAL OF ARTERIAL BLOOD: CPT

## 2020-09-21 PROCEDURE — 84132 ASSAY OF SERUM POTASSIUM: CPT

## 2020-09-21 PROCEDURE — 82803 BLOOD GASES ANY COMBINATION: CPT

## 2020-09-21 PROCEDURE — 99291 CRITICAL CARE FIRST HOUR: CPT | Performed by: INTERNAL MEDICINE

## 2020-09-21 PROCEDURE — 84145 PROCALCITONIN (PCT): CPT | Performed by: NURSE PRACTITIONER

## 2020-09-21 PROCEDURE — 93971 EXTREMITY STUDY: CPT

## 2020-09-21 PROCEDURE — 85014 HEMATOCRIT: CPT

## 2020-09-21 PROCEDURE — 84295 ASSAY OF SERUM SODIUM: CPT

## 2020-09-21 PROCEDURE — 82330 ASSAY OF CALCIUM: CPT

## 2020-09-21 PROCEDURE — 80053 COMPREHEN METABOLIC PANEL: CPT | Performed by: NURSE PRACTITIONER

## 2020-09-21 PROCEDURE — 93971 EXTREMITY STUDY: CPT | Performed by: INTERNAL MEDICINE

## 2020-09-21 RX ORDER — MAGNESIUM SULFATE HEPTAHYDRATE 40 MG/ML
2 INJECTION, SOLUTION INTRAVENOUS ONCE
Status: COMPLETED | OUTPATIENT
Start: 2020-09-21 | End: 2020-09-21

## 2020-09-21 RX ORDER — POTASSIUM CHLORIDE 20MEQ/15ML
20 LIQUID (ML) ORAL ONCE
Status: COMPLETED | OUTPATIENT
Start: 2020-09-21 | End: 2020-09-21

## 2020-09-21 RX ORDER — FLUDROCORTISONE ACETATE 0.1 MG/1
0.05 TABLET ORAL DAILY
Status: DISCONTINUED | OUTPATIENT
Start: 2020-09-21 | End: 2020-09-24 | Stop reason: HOSPADM

## 2020-09-21 RX ORDER — POTASSIUM CHLORIDE 29.8 MG/ML
40 INJECTION INTRAVENOUS ONCE
Status: COMPLETED | OUTPATIENT
Start: 2020-09-21 | End: 2020-09-21

## 2020-09-21 RX ORDER — FUROSEMIDE 10 MG/ML
40 INJECTION INTRAMUSCULAR; INTRAVENOUS
Status: DISCONTINUED | OUTPATIENT
Start: 2020-09-21 | End: 2020-09-21

## 2020-09-21 RX ORDER — MAGNESIUM SULFATE HEPTAHYDRATE 40 MG/ML
4 INJECTION, SOLUTION INTRAVENOUS ONCE
Status: DISCONTINUED | OUTPATIENT
Start: 2020-09-21 | End: 2020-09-21

## 2020-09-21 RX ORDER — PANTOPRAZOLE SODIUM 40 MG/1
40 TABLET, DELAYED RELEASE ORAL
Status: DISCONTINUED | OUTPATIENT
Start: 2020-09-21 | End: 2020-09-24 | Stop reason: HOSPADM

## 2020-09-21 RX ORDER — LACTULOSE 20 G/30ML
20 SOLUTION ORAL DAILY
Status: DISCONTINUED | OUTPATIENT
Start: 2020-09-22 | End: 2020-09-24 | Stop reason: HOSPADM

## 2020-09-21 RX ORDER — POTASSIUM CHLORIDE 20MEQ/15ML
40 LIQUID (ML) ORAL ONCE
Status: COMPLETED | OUTPATIENT
Start: 2020-09-21 | End: 2020-09-21

## 2020-09-21 RX ORDER — PHYTONADIONE 5 MG/1
5 TABLET ORAL ONCE
Status: COMPLETED | OUTPATIENT
Start: 2020-09-21 | End: 2020-09-21

## 2020-09-21 RX ADMIN — LACTULOSE 20 G: 10 SOLUTION ORAL at 09:13

## 2020-09-21 RX ADMIN — ALBUMIN (HUMAN) 25 G: 0.25 INJECTION, SOLUTION INTRAVENOUS at 04:29

## 2020-09-21 RX ADMIN — POTASSIUM CHLORIDE 40 MEQ: 20 SOLUTION ORAL at 09:13

## 2020-09-21 RX ADMIN — POTASSIUM CHLORIDE 20 MEQ: 20 SOLUTION ORAL at 13:23

## 2020-09-21 RX ADMIN — MIDODRINE HYDROCHLORIDE 10 MG: 5 TABLET ORAL at 06:37

## 2020-09-21 RX ADMIN — MAGNESIUM SULFATE IN WATER 2 G: 40 INJECTION, SOLUTION INTRAVENOUS at 15:13

## 2020-09-21 RX ADMIN — PANTOPRAZOLE SODIUM 40 MG: 40 TABLET, DELAYED RELEASE ORAL at 06:37

## 2020-09-21 RX ADMIN — ALBUMIN (HUMAN) 25 G: 0.25 INJECTION, SOLUTION INTRAVENOUS at 11:09

## 2020-09-21 RX ADMIN — MIDODRINE HYDROCHLORIDE 10 MG: 5 TABLET ORAL at 11:13

## 2020-09-21 RX ADMIN — PHYTONADIONE 5 MG: 5 TABLET ORAL at 13:22

## 2020-09-21 RX ADMIN — OCTREOTIDE ACETATE 50 MCG/HR: 500 INJECTION, SOLUTION INTRAVENOUS; SUBCUTANEOUS at 14:31

## 2020-09-21 RX ADMIN — ALBUMIN (HUMAN) 25 G: 0.25 INJECTION, SOLUTION INTRAVENOUS at 21:48

## 2020-09-21 RX ADMIN — RIFAXIMIN 550 MG: 550 TABLET ORAL at 21:00

## 2020-09-21 RX ADMIN — RIFAXIMIN 550 MG: 550 TABLET ORAL at 09:13

## 2020-09-21 RX ADMIN — Medication 20 MG/HR: at 04:30

## 2020-09-21 RX ADMIN — FLUDROCORTISONE ACETATE 0.05 MG: 0.1 TABLET ORAL at 13:22

## 2020-09-21 RX ADMIN — POTASSIUM CHLORIDE 40 MEQ: 29.8 INJECTION, SOLUTION INTRAVENOUS at 13:27

## 2020-09-21 RX ADMIN — OCTREOTIDE ACETATE 50 MCG/HR: 500 INJECTION, SOLUTION INTRAVENOUS; SUBCUTANEOUS at 04:29

## 2020-09-21 RX ADMIN — POTASSIUM CHLORIDE 40 MEQ: 29.8 INJECTION, SOLUTION INTRAVENOUS at 02:37

## 2020-09-21 RX ADMIN — MIDODRINE HYDROCHLORIDE 10 MG: 5 TABLET ORAL at 15:16

## 2020-09-21 RX ADMIN — PANTOPRAZOLE SODIUM 40 MG: 40 TABLET, DELAYED RELEASE ORAL at 15:16

## 2020-09-21 RX ADMIN — ALBUMIN (HUMAN) 25 G: 0.25 INJECTION, SOLUTION INTRAVENOUS at 16:27

## 2020-09-21 NOTE — ASSESSMENT & PLAN NOTE
· Pt with mitral murmur on initial exam  · Previous Echo with severe non-obstructive hypertrophic cardiomyopathy - EF 70%  · Repeat echo - EF 60%, G2DD, mod MVR, mild AS, no regional wall abnormalities  · Hold Nadolol due to hypotension - avoid tachycardia   · Continue midodrine for hypotension

## 2020-09-21 NOTE — PROGRESS NOTES
Progress note - Gastroenterology   Gregory Albright 76 y o  male MRN: [id number]  Unit/Bed#: -01 Encounter: [id number]    ASSESSMENT and PLAN    1  MILLER cirrhosis, MELD 35, decompensated by hepatic encephalopathy, coagulopathy, ascites, thrombocytopenia, esophageal varices admitted with sepsis like picture, hypotensive and in acute respiratory failure with lactic acidosis   Volume overloaded, on diuretics with good urinary output   Paracentesis negative for SBP   Ultrasound negative for HCC   CT negative for portal vein thrombus  2  Hypoxic respiratory failure - off BiPAP and on high-flow nasal cannula  3  Chronic liver disease in acute liver failure      Total bilirubin increased to 16 8  INR slightly decreased  to 4 19 from 4 89  Patient was accepted for transfer to [address] [place] [address], there are no beds to accept the transfer   Awaiting a bed, and in the meantime agree with supportive care per ICU team   Continue antibiotics  -  advance diet to 2 g sodium  - continue rifaximin  -  decrease lactulose to twice daily this patient reports 4-5 loose stools daily   - continue lasix and monitor urine output      Prognosis is very guarded  Chief Complaint   Patient presents with    Weakness - Generalized     patient complaint of "loosing balance and guided self to the floor,landed on buttocks, denies loc, denies head injury, increasing weakness last month       SUBJECTIVE/HPI   Patient complaints of diarrhea  Denies any other complaints      /68   Pulse 77   Temp 97 5 °F (36 4 °C) (Axillary)   Resp (!) 24   Ht 5' 10" (1 778 m)   Wt 106 kg (232 lb 12 9 oz)   SpO2 94%   BMI 33 40 kg/m²     PHYSICALEXAM  General appearance: alert, and cooperative  Eyes:  Positive scleral icterus   Head: Normocephalic, without obvious abnormality, atraumatic  Lungs: clear to auscultation bilaterally  Heart: regular rate and rhythm, S1, S2 normal, no murmur, click, rub or gallop  Abdomen: soft, non-tender; bowel sounds normal; no masses,  no organomegaly  Extremities: extremities normal, atraumatic, no cyanosis or edema  Neurologic: Grossly normal, no asterixis  Skin - positive jaundice    Lab Results   Component Value Date    GLUCOSE 117 09/19/2020    CALCIUM 8 3 09/21/2020    K 3 5 09/21/2020    CO2 32 09/21/2020    CL 95 (L) 09/21/2020    BUN 36 (H) 09/21/2020    CREATININE 1 27 09/21/2020     Lab Results   Component Value Date    WBC 8 94 09/21/2020    HGB 8 7 (L) 09/21/2020    HCT 24 7 (L) 09/21/2020     (H) 09/21/2020    PLT 44 (LL) 09/21/2020     Lab Results   Component Value Date    ALT 59 09/21/2020     (H) 09/21/2020    ALKPHOS 83 09/21/2020     No results found for: AMYLASE  No results found for: LIPASE  Lab Results   Component Value Date    IRON 73 09/20/2020    TIBC 83 (L) 09/20/2020    FERRITIN 1,467 (H) 09/20/2020     Lab Results   Component Value Date    INR 4 19 (H) 09/21/2020

## 2020-09-21 NOTE — PROGRESS NOTES
Progress Note - Kelly Garza 1955, 76 y o  male MRN: [id number]    Unit/Bed#: -01 Encounter: [id number]    Primary Care Provider: No primary care provider on file     Date and time admitted to hospital: 9/17/2020  1:08 PM        * [facility])  Assessment & Plan  · Secondary to low oncotic pressure in setting of cirrhosis vs sepsis PNA  · CT chest - b/l GGO with R>L pleural effusions  · UA - positive nitrites only, does not appear to be UTI  · Blood culture - 1/2 staph coag, repeat NG48  · S/p paracentesis -negative for SBP  · Procalcitonin 1 0, 0 92, 0 39  · Pt with refractory hypotension following paracentesis, central line and arterial line placed, levophed started  · Levo off yesterday, required again overnight for hypotension while sleeping  · SBP goal >90 overnight   · Cont Zosyn- pt with allergy to cephalosporins  · Cont scheduled Albumin and Midodrine    Acute respiratory failure with hypoxia (HCC)  Assessment & Plan  · Secondary to volume overload in setting of sepsis IVF administration with known decompensated cirrhosis  · Continues to require Bipap and HFNC for persistent hypoxia 14/8 50%  · On HFNC yesterday for a few hours, tolerated well  · Cont diuresis with Lasix infusion- decreased to 20/hr yesterday   · CT chest - R>L pleural effusions, may need thoracentesis if no improvement with diuresis   · Cont Atrovent/Xopenex and Mucinex  · Goal SpO2 >92%  · If bed at [facility] becomes available pt will need to be intubated prior to transport, pt has history of difficult intubation, will need anesthesia at bedside    MILLER with portal HTN/ascites/varicies  Assessment & Plan  · Pt sees Dr Lucia Portillo of [facility] as outpatient  · PT is scheduled for transplant evaluation with Dr Lulu Aaron on 9/21  · MELD 35 - consistent with 52% 3 month mortality  · Pt accepted at [facility] by hepatologist and critical care - awaiting available bed  · GI following  · Cont lactulose and Rifaximin  · Cont Octreotide   · Cont SBP ABX Zosyn D#5 as pt was admitted with signs of sepsis  · 9/18 S/p paracentesis with 3 2L fluid removed, negative for SBP    Hyponatremia  Assessment & Plan  · Secondary to cirrhosis  · Urine Na 44  · Nephrology following  · Cont diuresis with Lasix  · Na-135    Thrombocytopenia (HCC)  Assessment & Plan  · Secondary to decompensated cirrhosis  · Cont to trend plt  · Transfuse if <20 or signs of active bleeding     Coagulopathy (HCC)  Assessment & Plan  · 2/2 liver failure  · INR 3 0 > 3 8 > 3 7> 4 8> 4 5  · S/p 2 u FFP in the ED  · Cont to trend INR  · No active bleeding   · Received 10Vit k yesterday  · Fibrinogen <60    Hyperbilirubinemia  Assessment & Plan  · 2/2 advanced liver disease  · Cont to trend LFT's     SAMANTHA (acute kidney injury) ([facility] [address] )  Assessment & Plan  · Baseline creatinine 1 08  · Creatinine prior trending 1 8 - 1 9, improved this am 1 5  · Likely secondary to hepatorenal failure  · Nephrology following  · Cont Lasix infusion, metalozone yesterday  · Cont Midodrine and Levo with goal MAP >65, overnight goal SBP>90  · Avoid nephrotoxic medications    Anemia  Assessment & Plan  · Unknown baseline, Hgb on admission 12, now trending 9 s/p IVF resuscitation   · Cont to monitor  · Transfuse if <7, caution with transfusion as pt is transplant candidate     Non-obstructive hypertrophic cardiomyopathy ([facility] [address] )  Assessment & Plan  · Pt with mitral murmur on initial exam  · Previous Echo with severe non-obstructive hypertrophic cardiomyopathy - EF 70%  · Repeat echo - EF 60%, G2DD, mod MVR, mild AS, no regional wall abnormalities  · Hold Nadolol due to hypotension - avoid tachycardia   · Continue midodrine for hypotension     Abnormal CT scan of head  Assessment & Plan  · In the ED - CT head 3mm questionable small SAH  · Neurosurgery reviewed CT, feels this is artifact  · No further imaging or work up warranted     Accidental fall  Assessment & Plan  · Pt with controlled fall to ground  · No LOC  · Head ct read as "possible small subarachnoid hemorrhage" - Discussion with neurosurgery -  They feel it is artifact  · Neuro exams  · No significant fractures on imaging  · Left thigh ecchymosis and swelling - improved since admission       ----------------------------------------------------------------------------------------  HPI/24hr events: Pt on HFNC yesterday and swithced back to bipap overnight, tolerated well  Still waiting for a bed at [facility]  Pt did require levo overnight for hypotension 80/50's    Pt denies CP, SOB, N/V, had mild abd pain on movement  Disposition: Continue Critical Care   Code Status: Level 1 - Full Code  ---------------------------------------------------------------------------------------  SUBJECTIVE  "i'm doing ok"    Review of Systems  Review of systems was reviewed and negative unless stated above in HPI/24-hour events   ---------------------------------------------------------------------------------------  OBJECTIVE    Vitals   Vitals:    20 0300 20 03120 03220 0353   BP: 125/62      Pulse: 73 78 85    Resp: 17 20 (!) 23    Temp:    97 9 °F (36 6 °C)   TempSrc:    Axillary   SpO2: 97% 97% 96%    Weight:       Height:         Temp (24hrs), Av °F (36 7 °C), Min:97 5 °F (36 4 °C), Max:98 5 °F (36 9 °C)  Current: Temperature: 97 9 °F (36 6 °C)  Arterial Line BP: 139/62  Arterial Line MAP (mmHg): 89 mmHg    Respiratory:  SpO2: SpO2: 96 %, SpO2 Activity: SpO2 Activity: At Rest  Nasal Cannula O2 Flow Rate (L/min): 10 L/min    Invasive/non-invasive ventilation settings   Respiratory    Lab Data (Last 4 hours)    None         O2/Vent Data (Last 4 hours)       0022          Non-Invasive Ventilation Mode BiPAP                   Physical Exam  HENT:      Head: Normocephalic  Eyes:      Pupils: Pupils are equal, round, and reactive to light  Neck:      Musculoskeletal: Normal range of motion     Cardiovascular:      Rate and Rhythm: Normal rate and regular rhythm  Pulmonary:      Effort: Pulmonary effort is normal    Abdominal:      General: Bowel sounds are normal  There is distension  Musculoskeletal: Normal range of motion  Skin:     Capillary Refill: Capillary refill takes less than 2 seconds  Coloration: Skin is jaundiced  Neurological:      Mental Status: He is alert and oriented to person, place, and time     Psychiatric:         Mood and Affect: Mood normal          Laboratory and Diagnostics:  Results from last 7 days   Lab Units 09/20/20  0518 09/19/20  0845 09/19/20  0505 09/18/20  1434 09/18/20  0656 09/18/20  0508 09/17/20  1846 09/17/20  1320   WBC Thousand/uL 9 09  --  10 29*  --   --  13 40*  --  15 48*   HEMOGLOBIN g/dL 8 6*  --  8 2*  --  9 4* 9 4*  --  12 1   I STAT HEMOGLOBIN g/dl  --  8 5*  --  9 2*  --   --   --   --    HEMATOCRIT % 25 2*  --  24 4*  --  26 6* 27 1*  --  34 9*   HEMATOCRIT, ISTAT %  --  25*  --  27*  --   --   --   --    PLATELETS Thousands/uL 54*  --  56*  --   --  57* 63* 82*   NEUTROS PCT % 75  --   --   --   --  75  --   --    BANDS PCT %  --   --   --   --   --   --   --  5   MONOS PCT % 6  --   --   --   --  8  --   --    MONO PCT %  --   --   --   --   --   --   --  4     Results from last 7 days   Lab Units 09/21/20  0005 09/20/20  1806 09/20/20  1142 09/20/20  0731 09/20/20  0521 09/20/20  0202 09/19/20  1804  09/19/20  0505  09/18/20  0508  09/17/20  1320   SODIUM mmol/L 135* 135* 135* 135* 136 133* 132*   < > 131*   < > 127*   < > 124*   POTASSIUM mmol/L 3 5 3 3* 3 3* 3 3* 3 2* 3 2* 3 6   < > 3 9   < > 4 7   < > 5 0   CHLORIDE mmol/L 96* 96* 95* 95* 96* 96* 97*   < > 98*   < > 98*   < > 94*   CO2 mmol/L 32 29 30 30 29 29 24   < > 23   < > 23   < > 21   CO2, I-STAT   --   --   --   --   --   --   --    < >  --    < >  --   --   --    ANION GAP mmol/L 7 10 10 10 11 8 11   < > 10   < > 6   < > 9   BUN mg/dL 38* 38* 40* 39* 40* 40* 43*   < > 41*   < > 38*   < > 36*   CREATININE mg/dL 1 38* 1 52* 1 60* 1 56* 1 59* 1 57* 1 91*   < > 1 84*   < > 1 89*   < > 1 97*   CALCIUM mg/dL 8 2* 8 4 8 0* 7 7* 7 9* 7 9* 7 5*   < > 8 0*   < > 7 9*   < > 8 1*   GLUCOSE RANDOM mg/dL 113 102 130 128 128 147* 152*   < > 120   < > 58*   < > 75   ALT U/L  --   --   --  71 70  --   --   --  67  --  71  --  99*   AST U/L  --   --   --  170* 165*  --   --   --  163*  --  173*  --  238*   ALK PHOS U/L  --   --   --  92 90  --   --   --  211*  --  122*  --  144*   ALBUMIN g/dL  --   --   --  3 2* 3 2*  --   --   --  2 6*  --  1 1*  --  1 4*   TOTAL BILIRUBIN mg/dL  --   --   --  15 00* 14 30*  --   --   --  11 30*  --  7 70*  --  9 50*    < > = values in this interval not displayed  Results from last 7 days   Lab Units 09/20/20  0745 09/19/20  1129 09/18/20  2221 09/18/20  0508 09/17/20  1320   MAGNESIUM mg/dL 2 0 2 2 1 9 1 9 2 1   PHOSPHORUS mg/dL  --   --   --  3 9  --       Results from last 7 days   Lab Units 09/20/20  1539 09/20/20  0518 09/19/20  0505 09/18/20  0656 09/18/20  0508 09/17/20  1320   INR  4 58* 4 89* 3 76* 3 82* 3 07* 3 44*   PTT seconds  --   --   --   --   --  75*      Results from last 7 days   Lab Units 09/18/20  1616 09/18/20  0508 09/18/20  0014 09/17/20  2058 09/17/20  1846 09/17/20  1320   TROPONIN I ng/mL 0 86* 0 61* 0 42* 0 38* 0 31* 0 29*     Results from last 7 days   Lab Units 09/18/20  1616 09/18/20  0508 09/18/20  0014 09/17/20  1536 09/17/20  1335   LACTIC ACID mmol/L 2 4* 2 9* 3 1* 5 1* 5 8*     ABG:    VBG:    Results from last 7 days   Lab Units 09/20/20  0518 09/19/20  0505 09/18/20  0508   PROCALCITONIN ng/ml 0 39* 0 92* 1 03*       Micro  Results from last 7 days   Lab Units 09/18/20  1617 09/18/20  1610 09/18/20  1420 09/17/20  1537 09/17/20  1406 09/17/20  1335   BLOOD CULTURE  No Growth at 48 hrs  No Growth at 48 hrs   --   --  No Growth at 72 hrs   Staphylococcus coagulase negative*   GRAM STAIN RESULT   --   --  1+ Polys  No bacteria seen  --   --  Gram positive cocci in clusters*   URINE CULTURE   --   --   --  No Growth <1000 cfu/mL  --   --    BODY FLUID CULTURE, STERILE   --   --  No growth  --   --   --        EKG: SR on tele  Imaging: I have personally reviewed pertinent reports  and I have personally reviewed pertinent films in PACS    Intake and Output  I/O       09/19 0701 - 09/20 0700 09/20 0701 - 09/21 0700    P  O  680 680    I V  (mL/kg) 1115 2 (9 5) 602 5 (5 1)    IV Piggyback 1097 9 627 5    Total Intake(mL/kg) 2893 1 (24 5) 1910 (16 2)    Urine (mL/kg/hr) 68077 (3 7) 6155 (2 2)    Stool 5 4    Total Output 73763 6159    Net -0273 7 -4129          Unmeasured Stool Occurrence 5 x 7 x          Height and Weights   Height: 5' 10" (177 8 cm)  IBW: 73 kg  Body mass index is 37 36 kg/m²  Weight (last 2 days)     Date/Time   Weight    09/20/20 0600   118 (260 36)    09/19/20 0600   120 (264 99)                Nutrition       Diet Orders   (From admission, onward)             Start     Ordered    09/20/20 0911  Diet Clear Liquid  Diet effective now     Question Answer Comment   Diet Type Clear Liquid    RD to adjust diet per protocol?  Yes        09/20/20 0910                  Active Medications  Scheduled Meds:  Current Facility-Administered Medications   Medication Dose Route Frequency Provider Last Rate    albumin human  25 g Intravenous Q6H ELIZABETH Castillo Stopped (09/20/20 2326)    albuterol  2 5 mg Nebulization Q4H PRN Herman Cushing, MD      furosemide  20 mg/hr Intravenous Continuous Rayna Nathan CRNP 20 mg/hr (09/20/20 0815)    lactulose  20 g Oral BID ELIZABETH Campbell      midodrine  10 mg Oral TID AC ELIZABETH Tidwell      norepinephrine  1-30 mcg/min Intravenous Titrated Rayna MillJORGE chanNP 1 mcg/min (09/20/20 2342)    octreotide  50 mcg/hr Intravenous Continuous ELIZABETH Castillo 50 mcg/hr (09/20/20 1716)    pantoprazole  40 mg Oral Early Morning ELIZABETH Castillo      potassium chloride  40 mEq Intravenous Once Bala Richards ELIZABETH 40 mEq (09/21/20 0237)    rifaximin  550 mg Oral Q12H [address] ELIZABETH Campbell       Continuous Infusions:  furosemide, 20 mg/hr, Last Rate: 20 mg/hr (09/20/20 0815)  norepinephrine, 1-30 mcg/min, Last Rate: 1 mcg/min (09/20/20 2342)  octreotide, 50 mcg/hr, Last Rate: 50 mcg/hr (09/20/20 1716)      PRN Meds:   albuterol, 2 5 mg, Q4H PRN        Invasive Devices Review  Invasive Devices     Central Venous Catheter Line            CVC Central Lines 09/18/20 2 days          Arterial Line            Arterial Line 09/18/20 Left Radial 2 days          Drain            Urethral Catheter Double-lumen 16 Fr  2 days                Rationale for remaining devices: PETEY TLC for pressors  ---------------------------------------------------------------------------------------  Advance Directive and Living Will:      Power of :    POLST:    ---------------------------------------------------------------------------------------  Care Time Delivered:   No Critical Care time spent       Bala RichardsELIZABETH      Portions of the record may have been created with voice recognition software  Occasional wrong word or "sound a like" substitutions may have occurred due to the inherent limitations of voice recognition software    Read the chart carefully and recognize, using context, where substitutions have occurred

## 2020-09-21 NOTE — ASSESSMENT & PLAN NOTE
· Secondary to low oncotic pressure in setting of cirrhosis vs sepsis PNA  · CT chest - b/l GGO with R>L pleural effusions  · UA - positive nitrites only, does not appear to be UTI  · Blood culture - 1/2 staph coag, repeat NG48  · S/p paracentesis -negative for SBP  · Procalcitonin 1 0, 0 92, 0 39  · Pt with refractory hypotension following paracentesis, central line and arterial line placed, levophed started  · Levo off yesterday, required again overnight for hypotension while sleeping  · SBP goal >90 overnight   · Cont Zosyn- pt with allergy to cephalosporins  · Cont scheduled Albumin and Midodrine

## 2020-09-21 NOTE — PLAN OF CARE
Problem: Potential for Falls  Goal: Patient will remain free of falls  Description: INTERVENTIONS:  - Assess patient frequently for physical needs  -  Identify cognitive and physical deficits and behaviors that affect risk of falls    -  [place] fall precautions as indicated by assessment   - Educate patient/family on patient safety including physical limitations  - Instruct patient to call for assistance with activity based on assessment  - Modify environment to reduce risk of injury  - Consider OT/PT consult to assist with strengthening/mobility  Outcome: Progressing     Problem: PAIN - ADULT  Goal: Verbalizes/displays adequate comfort level or baseline comfort level  Description: Interventions:  - Encourage patient to monitor pain and request assistance  - Assess pain using appropriate pain scale  - Administer analgesics based on type and severity of pain and evaluate response  - Implement non-pharmacological measures as appropriate and evaluate response  - Consider cultural and social influences on pain and pain management  - Notify physician/advanced practitioner if interventions unsuccessful or patient reports new pain  Outcome: Progressing     Problem: INFECTION - ADULT  Goal: Absence or prevention of progression during hospitalization  Description: INTERVENTIONS:  - Assess and monitor for signs and symptoms of infection  - Monitor lab/diagnostic results  - Monitor all insertion sites, i e  indwelling lines, tubes, and drains  - Monitor endotracheal if appropriate and nasal secretions for changes in amount and color  - [place] appropriate cooling/warming therapies per order  - Administer medications as ordered  - Instruct and encourage patient and family to use good hand hygiene technique  - Identify and instruct in appropriate isolation precautions for identified infection/condition  Outcome: Progressing  Goal: Absence of fever/infection during neutropenic period  Description: INTERVENTIONS:  - Monitor WBC    Outcome: Progressing     Problem: SAFETY ADULT  Goal: Patient will remain free of falls  Description: INTERVENTIONS:  - Assess patient frequently for physical needs  -  Identify cognitive and physical deficits and behaviors that affect risk of falls    -  [place] fall precautions as indicated by assessment   - Educate patient/family on patient safety including physical limitations  - Instruct patient to call for assistance with activity based on assessment  - Modify environment to reduce risk of injury  - Consider OT/PT consult to assist with strengthening/mobility  Outcome: Progressing  Goal: Maintain or return to baseline ADL function  Description: INTERVENTIONS:  -  Assess patient's ability to carry out ADLs; assess patient's baseline for ADL function and identify physical deficits which impact ability to perform ADLs (bathing, care of mouth/teeth, toileting, grooming, dressing, etc )  - Assess/evaluate cause of self-care deficits   - Assess range of motion  - Assess patient's mobility; develop plan if impaired  - Assess patient's need for assistive devices and provide as appropriate  - Encourage maximum independence but intervene and supervise when necessary  - Involve family in performance of ADLs  - Assess for home care needs following discharge   - Consider OT consult to assist with ADL evaluation and planning for discharge  - Provide patient education as appropriate  Outcome: Progressing  Goal: Maintain or return mobility status to optimal level  Description: INTERVENTIONS:  - Assess patient's baseline mobility status (ambulation, transfers, stairs, etc )    - Identify cognitive and physical deficits and behaviors that affect mobility  - Identify mobility aids required to assist with transfers and/or ambulation (gait belt, sit-to-stand, lift, walker, cane, etc )  - [place] fall precautions as indicated by assessment  - Record patient progress and toleration of activity level on Mobility SBAR; progress patient to next Phase/Stage  - Instruct patient to call for assistance with activity based on assessment  - Consider rehabilitation consult to assist with strengthening/weightbearing, etc   Outcome: Progressing     Problem: DISCHARGE PLANNING  Goal: Discharge to home or other facility with appropriate resources  Description: INTERVENTIONS:  - Identify barriers to discharge w/patient and caregiver  - Arrange for needed discharge resources and transportation as appropriate  - Identify discharge learning needs (meds, wound care, etc )  - Arrange for interpretive services to assist at discharge as needed  - Refer to Case Management Department for coordinating discharge planning if the patient needs post-hospital services based on physician/advanced practitioner order or complex needs related to functional status, cognitive ability, or social support system  Outcome: Progressing     Problem: Knowledge Deficit  Goal: Patient/family/caregiver demonstrates understanding of disease process, treatment plan, medications, and discharge instructions  Description: Complete learning assessment and assess knowledge base    Interventions:  - Provide teaching at level of understanding  - Provide teaching via preferred learning methods  Outcome: Progressing     Problem: Prexisting or High Potential for Compromised Skin Integrity  Goal: Skin integrity is maintained or improved  Description: INTERVENTIONS:  - Identify patients at risk for skin breakdown  - Assess and monitor skin integrity  - Assess and monitor nutrition and hydration status  - Monitor labs   - Assess for incontinence   - Turn and reposition patient  - Assist with mobility/ambulation  - Relieve pressure over bony prominences  - Avoid friction and shearing  - Provide appropriate hygiene as needed including keeping skin clean and dry  - Evaluate need for skin moisturizer/barrier cream  - Collaborate with interdisciplinary team   - Patient/family teaching  - Consider wound care consult   Outcome: Progressing     Problem: RESPIRATORY - ADULT  Goal: Achieves optimal ventilation and oxygenation  Description: INTERVENTIONS:  - Assess for changes in respiratory status  - Assess for changes in mentation and behavior  - Position to facilitate oxygenation and minimize respiratory effort  - Oxygen administered by appropriate delivery if ordered  - Initiate smoking cessation education as indicated  - Encourage broncho-pulmonary hygiene including cough, deep breathe, Incentive Spirometry  - Assess the need for suctioning and aspirate as needed  - Assess and instruct to report SOB or any respiratory difficulty  - Respiratory Therapy support as indicated  Outcome: Progressing     Problem: GENITOURINARY - ADULT  Goal: Urinary catheter remains patent  Description: INTERVENTIONS:  - Assess patency of urinary catheter  - If patient has a chronic flower, consider changing catheter if non-functioning  - Follow guidelines for intermittent irrigation of non-functioning urinary catheter  Outcome: Progressing     Problem: METABOLIC, FLUID AND ELECTROLYTES - ADULT  Goal: Electrolytes maintained within normal limits  Description: INTERVENTIONS:  - Monitor labs and assess patient for signs and symptoms of electrolyte imbalances  - Administer electrolyte replacement as ordered  - Monitor response to electrolyte replacements, including repeat lab results as appropriate  - Instruct patient on fluid and nutrition as appropriate  Outcome: Progressing  Goal: Fluid balance maintained  Description: INTERVENTIONS:  - Monitor labs   - Monitor I/O and WT  - Instruct patient on fluid and nutrition as appropriate  - Assess for signs & symptoms of volume excess or deficit  Outcome: Progressing  Goal: Glucose maintained within target range  Description: INTERVENTIONS:  - Monitor Blood Glucose as ordered  - Assess for signs and symptoms of hyperglycemia and hypoglycemia  - Administer ordered medications to maintain glucose within target range  - Assess nutritional intake and initiate nutrition service referral as needed  Outcome: Progressing     Problem: HEMATOLOGIC - ADULT  Goal: Maintains hematologic stability  Description: INTERVENTIONS  - Assess for signs and symptoms of bleeding or hemorrhage  - Monitor labs  - Administer supportive blood products/factors as ordered and appropriate  Outcome: Progressing

## 2020-09-21 NOTE — TELEPHONE ENCOUNTER
Patient transferred to [facility] from [address] for sepsis  See other chart on patient (charts need to be merged)

## 2020-09-21 NOTE — ASSESSMENT & PLAN NOTE
· Baseline creatinine 1 08  · Creatinine prior trending 1 8 - 1 9, improved this am 1 5  · Likely secondary to hepatorenal failure  · Nephrology following  · Cont Lasix infusion, metalozone yesterday  · Cont Midodrine and Levo with goal MAP >65, overnight goal SBP>90  · Avoid nephrotoxic medications

## 2020-09-21 NOTE — ASSESSMENT & PLAN NOTE
· 2/2 liver failure  · INR 3 0 > 3 8 > 3 7> 4 8> 4 5  · S/p 2 u FFP in the ED  · Cont to trend INR  · No active bleeding   · Received 10Vit k yesterday  · Fibrinogen <60

## 2020-09-21 NOTE — PROGRESS NOTES
NEPHROLOGY PROGRESS NOTE   Stefanie Finch 76 y o  male MRN: [id number]  Unit/Bed#: -01 Encounter: [id number]  Reason for Consult:  Acute kidney injury    ASSESSMENT/PLAN:  SAMANTHA:  Suspected secondary to hepatorenal syndrome with decrease in effective circulatory volume   -presents with creatinine 1 97   -creatinine improved to baseline   -baseline creatinine in 2018 around 1 0    -continues on triple therapy with midodrine, octreotide, and albumin   -will discontinue continuous IV lasix    -place on Lasix 40 mg IV BID  -status post IV contrast 9/17   -CK level trended down  -CT scan negative for hydro  -UA showed glucose, small blood, trace ketones, positive nitrites, trace protein, 30-50 WBCs  -urine culture negative   -continue Roberts catheter   -consent obtained for RRT and in chart if needed   -no urgent indication for RRT  -continue to avoid nephrotoxins, hypotension, IV contrast   -I/O  -urine output 8 4 L 9/20  Hemodynamics:  Blood pressure is stable and acceptable  Presented with hypotension  Echo shows EF of 60% with grade 2 diastolic dysfunction   -avoid episodes of hypotension or high fluctuations in blood pressure   -recommend holding parameters for antihypertensive for systolic blood pressure less than 130 mmHg    -home antihypertensives on hold  -will discontinue Lasix drip    -continues on IV albumin every 6 hours  -continue midodrine 10 mg t i d  Hypokalemia: In the setting of diuresis with Lasix drip    -Mag level 2 0   -continue to monitor replace as needed  Hyponatremia:  Likely secondary to cirrhosis  (resolved)  -continue fluid restriction  -urine osmolality 289, urine sodium 44, serum osmolality 295, normal a m  Cortisol  Acute respiratory failure with hypoxia: in the setting of suspected PNA  -repeat chest x-ray today pending  -CT scan showed small right greater then left pleural effusions       [place] cirrhosis:  With portal hypertension, ascites, and varices   -scheduled for transplant evaluation as outpatient   -await[facility]   -continues on lactulose, octreotide, and rifaximin   -s/p paracentesis for 3 2 L 9/18 of fluid removal   Negative for SBP   -discontinue Lasix gtt  Anemia with chronic Thrombocytopenia: In the setting of cirrhosis  -will continue to monitor and trend per primary team   -hemolysis smear negative for schistocytes or helmet cells  -iron panel shows normal iron sat and normal iron  Disposition:  Requiring additional stay due to medical needs  SUBJECTIVE:  The patient is resting in bed  He denies acute complaints  He denies chest pain or increased SOB  He remains on high flow O2  E denies N,V  He has chronic diarrheal stools       OBJECTIVE:  Current Weight: Weight - Scale: 106 kg (232 lb 12 9 oz)  Vitals:    09/21/20 0640 09/21/20 0650 09/21/20 0700 09/21/20 0748   BP:   143/80    Pulse: 80 78 74    Resp: 15 (!) 23 18    Temp:       TempSrc:       SpO2: 92% 98% 97% 94%   Weight:       Height:           Intake/Output Summary (Last 24 hours) at 9/21/2020 0836  Last data filed at 9/21/2020 0600  Gross per 24 hour   Intake 2081 46 ml   Output 6729 ml   Net -4647 54 ml     General: NAD  Skin: warm, dry, jaundice  HEENT: Moist mucous membranes, sclera anicteric, normocephalic, atraumatic  Neck: No apparent JVD appreciated  Chest: lung sounds clear B/L, on [place]  CVS:Regular rate and rhythm, no murmer   Abdomen: Soft, round, non-tender, +BS, flower  Extremities: trace B/L LE edema present, LLE edema > then right   Neuro: alert and oriented  Psych: appropriate mood and affect     Medications:    Current Facility-Administered Medications:     albumin human (FLEXBUMIN) 25 % injection 25 g, 25 g, Intravenous, Q6H, ELIZABETH Castillo, Stopped at 09/21/20 0600    albuterol inhalation solution 2 5 mg, 2 5 mg, Nebulization, Q4H PRN, Karthikeyan Omer MD    furosemide (LASIX) 500 mg infusion 50 mL, 20 mg/hr, Intravenous, Continuous, ELIZABETH Castillo, Last Rate: 2 mL/hr at 09/21/20 0430, 20 mg/hr at 09/21/20 0430    lactulose 20 g/30 mL oral solution 20 g, 20 g, Oral, BID, Tiny L ELIZABETH Gil, 20 g at 09/20/20 1716    midodrine (PROAMATINE) tablet 10 mg, 10 mg, Oral, TID AC, Lizzette ManriqueELIZABETH knowles, 10 mg at 09/21/20 7540    norepinephrine (LEVOPHED) 4 mg (STANDARD CONCENTRATION) IV in sodium chloride 0 9% 250 mL, 1-30 mcg/min, Intravenous, Titrated, ELIZABETH Castillo, Stopped at 09/21/20 0434    octreotide (SandoSTATIN) 500 mcg in sodium chloride 0 9 % 250 mL infusion, 50 mcg/hr, Intravenous, Continuous, ELIZABETH Castillo, Last Rate: 25 mL/hr at 09/21/20 0429, 50 mcg/hr at 09/21/20 0429    pantoprazole (PROTONIX) EC tablet 40 mg, 40 mg, Oral, Early Morning, ELIZABETH Castillo, 40 mg at 09/21/20 2338    rifaximin (XIFAXAN) tablet 550 mg, 550 mg, Oral, Q12H [facility], ELIZABETH Campbell, 550 mg at 09/20/20 2046    Laboratory Results:  Results from last 7 days   Lab Units 09/21/20  0718 09/21/20  0634 09/21/20  0005 09/20/20  1806  09/20/20  0745 09/20/20  0731 09/20/20  0521 09/20/20  0518  09/19/20  1129 09/19/20  0845 09/19/20  0505 09/18/20  2221  09/18/20  1434  09/18/20  0508   WBC [place]/uL  --  8 94  --   --   --   --   --   --  9 09  --   --   --  10 29*  --   --   --   --  13 40*   HEMOGLOBIN g/dL  --  8 7*  --   --   --   --   --   --  8 6*  --   --   --  8 2*  --   --   --    < > 9 4*   I STAT HEMOGLOBIN g/dl  --   --   --   --   --   --   --   --   --   --   --  8 5*  --   --   --  9 2*   < >  --    HEMATOCRIT %  --  24 7*  --   --   --   --   --   --  25 2*  --   --   --  24 4*  --   --   --    < > 27 1*   HEMATOCRIT, ISTAT %  --   --   --   --   --   --   --   --   --   --   --  25*  --   --   --  27*   < >  --    PLATELETS [place]/uL  --  44*  --   --   --   --   --   --  54*  --   --   --  56*  --   --   --   --  57*   SODIUM mmol/L 136  --  135* 135* < >  --  135* 136  --    < > 132*  --  131* 128*   < >  --   --  127*   POTASSIUM mmol/L 3 5  --  3 5 3 3*   < >  --  3 3* 3 2*  --    < > 3 5  --  3 9 4 4   < >  --   --  4 7   CHLORIDE mmol/L 95*  --  96* 96*   < >  --  95* 96*  --    < > 97*  --  98* 97*   < >  --   --  98*   CO2 mmol/L 32  --  32 29   < >  --  30 29  --    < > 23  --  23 21   < >  --   --  23   CO2, I-STAT mmol/L  --   --   --   --   --   --   --   --   --   --   --  23  --   --   --  22   < >  --    BUN mg/dL 36*  --  38* 38*   < >  --  39* 40*  --    < > 44*  --  41* 43*   < >  --   --  38*   CREATININE mg/dL 1 27  --  1 38* 1 52*   < >  --  1 56* 1 59*  --    < > 1 84*  --  1 84* 1 93*   < >  --   --  1 89*   CALCIUM mg/dL 8 3  --  8 2* 8 4   < >  --  7 7* 7 9*  --    < > 7 7*  --  8 0* 7 5*   < >  --   --  7 9*   MAGNESIUM mg/dL  --   --   --   --   --  2 0  --   --   --   --  2 2  --   --  1 9  --   --   --  1 9   PHOSPHORUS mg/dL  --   --   --   --   --   --   --   --   --   --   --   --   --   --   --   --   --  3 9   ALK PHOS U/L 83  --   --   --   --   --  92 90  --   --   --   --  211*  --   --   --   --  122*   ALT U/L 59  --   --   --   --   --  71 70  --   --   --   --  67  --   --   --   --  71   AST U/L 137*  --   --   --   --   --  170* 165*  --   --   --   --  163*  --   --   --   --  173*   GLUCOSE, ISTAT mg/dl  --   --   --   --   --   --   --   --   --   --   --  117  --   --   --  97  --   --     < > = values in this interval not displayed

## 2020-09-21 NOTE — ASSESSMENT & PLAN NOTE
· Secondary to cirrhosis  · Urine Na 44  · Nephrology following  · Cont diuresis with Lasix  · Na-135

## 2020-09-21 NOTE — ASSESSMENT & PLAN NOTE
· Pt sees Dr Erica Chandler of [facility] as outpatient  · PT is scheduled for transplant evaluation with Dr Michelle Valentino on 9/21  · MELD 35 - consistent with 52% 3 month mortality  · Pt accepted at [facility] by hepatologist and critical care - awaiting available bed  · GI following  · Cont lactulose and Rifaximin  · Cont Octreotide   · Cont SBP ABX Zosyn D#5 as pt was admitted with signs of sepsis  · 9/18 S/p paracentesis with 3 2L fluid removed, negative for SBP

## 2020-09-22 PROBLEM — R79.0 LOW MAGNESIUM LEVEL: Status: ACTIVE | Noted: 2020-09-21

## 2020-09-22 PROBLEM — E83.42 HYPOMAGNESEMIA: Status: RESOLVED | Noted: 2020-09-21 | Resolved: 2020-09-22

## 2020-09-22 PROBLEM — E83.39 HYPOPHOSPHATEMIA: Status: ACTIVE | Noted: 2020-09-22

## 2020-09-22 PROBLEM — E87.6 HYPOKALEMIA: Status: RESOLVED | Noted: 2020-09-21 | Resolved: 2020-09-22

## 2020-09-22 PROBLEM — E87.3 ALKALOSIS: Status: ACTIVE | Noted: 2020-09-22

## 2020-09-22 LAB
ALBUMIN SERPL BCP-MCNC: 3.3 G/DL (ref 3.5–5)
ALP SERPL-CCNC: 76 U/L (ref 46–116)
ALT SERPL W P-5'-P-CCNC: 49 U/L (ref 12–78)
ANION GAP SERPL CALCULATED.3IONS-SCNC: 7 MMOL/L (ref 4–13)
ANION GAP SERPL CALCULATED.3IONS-SCNC: 7 MMOL/L (ref 4–13)
AST SERPL W P-5'-P-CCNC: 114 U/L (ref 5–45)
BACTERIA BLD CULT: NORMAL
BASOPHILS # BLD AUTO: 0.02 THOUSANDS/ΜL (ref 0–0.1)
BASOPHILS NFR BLD AUTO: 0 % (ref 0–1)
BILIRUB SERPL-MCNC: 18.4 MG/DL (ref 0.2–1)
BUN SERPL-MCNC: 33 MG/DL (ref 5–25)
BUN SERPL-MCNC: 33 MG/DL (ref 5–25)
CALCIUM ALBUM COR SERPL-MCNC: 9.1 MG/DL (ref 8.3–10.1)
CALCIUM SERPL-MCNC: 8.5 MG/DL (ref 8.3–10.1)
CALCIUM SERPL-MCNC: 8.8 MG/DL (ref 8.3–10.1)
CHLORIDE SERPL-SCNC: 95 MMOL/L (ref 100–108)
CHLORIDE SERPL-SCNC: 96 MMOL/L (ref 100–108)
CO2 SERPL-SCNC: 34 MMOL/L (ref 21–32)
CO2 SERPL-SCNC: 34 MMOL/L (ref 21–32)
CREAT SERPL-MCNC: 1.19 MG/DL (ref 0.6–1.3)
CREAT SERPL-MCNC: 1.19 MG/DL (ref 0.6–1.3)
EOSINOPHIL # BLD AUTO: 0.22 THOUSAND/ΜL (ref 0–0.61)
EOSINOPHIL NFR BLD AUTO: 2 % (ref 0–6)
ERYTHROCYTE [DISTWIDTH] IN BLOOD BY AUTOMATED COUNT: 17.9 % (ref 11.6–15.1)
GFR SERPL CREATININE-BSD FRML MDRD: 63 ML/MIN/1.73SQ M
GFR SERPL CREATININE-BSD FRML MDRD: 63 ML/MIN/1.73SQ M
GLUCOSE SERPL-MCNC: 101 MG/DL (ref 65–140)
GLUCOSE SERPL-MCNC: 121 MG/DL (ref 65–140)
HCT VFR BLD AUTO: 25.1 % (ref 36.5–49.3)
HGB BLD-MCNC: 8.5 G/DL (ref 12–17)
IMM GRANULOCYTES # BLD AUTO: 0.15 THOUSAND/UL (ref 0–0.2)
IMM GRANULOCYTES NFR BLD AUTO: 2 % (ref 0–2)
INR PPP: 4.83 (ref 0.84–1.19)
LYMPHOCYTES # BLD AUTO: 1.96 THOUSANDS/ΜL (ref 0.6–4.47)
LYMPHOCYTES NFR BLD AUTO: 21 % (ref 14–44)
MAGNESIUM SERPL-MCNC: 1.8 MG/DL (ref 1.6–2.6)
MAGNESIUM SERPL-MCNC: 2.2 MG/DL (ref 1.6–2.6)
MCH RBC QN AUTO: 36.6 PG (ref 26.8–34.3)
MCHC RBC AUTO-ENTMCNC: 33.9 G/DL (ref 31.4–37.4)
MCV RBC AUTO: 108 FL (ref 82–98)
MONOCYTES # BLD AUTO: 0.81 THOUSAND/ΜL (ref 0.17–1.22)
MONOCYTES NFR BLD AUTO: 9 % (ref 4–12)
NEUTROPHILS # BLD AUTO: 6.26 THOUSANDS/ΜL (ref 1.85–7.62)
NEUTS SEG NFR BLD AUTO: 66 % (ref 43–75)
PHOSPHATE SERPL-MCNC: 2.1 MG/DL (ref 2.3–4.1)
PLATELET # BLD AUTO: 53 THOUSANDS/UL (ref 149–390)
PMV BLD AUTO: 8.9 FL (ref 8.9–12.7)
POTASSIUM SERPL-SCNC: 3 MMOL/L (ref 3.5–5.3)
POTASSIUM SERPL-SCNC: 3 MMOL/L (ref 3.5–5.3)
PROT SERPL-MCNC: 5.3 G/DL (ref 6.4–8.2)
PROTHROMBIN TIME: 44.8 SECONDS (ref 11.6–14.5)
RBC # BLD AUTO: 2.32 MILLION/UL (ref 3.88–5.62)
SODIUM SERPL-SCNC: 136 MMOL/L (ref 136–145)
SODIUM SERPL-SCNC: 137 MMOL/L (ref 136–145)
TROPONIN I SERPL-MCNC: 0.26 NG/ML
WBC # BLD AUTO: 9.42 THOUSAND/UL (ref 4.31–10.16)

## 2020-09-22 PROCEDURE — 84100 ASSAY OF PHOSPHORUS: CPT | Performed by: NURSE PRACTITIONER

## 2020-09-22 PROCEDURE — 99233 SBSQ HOSP IP/OBS HIGH 50: CPT | Performed by: INTERNAL MEDICINE

## 2020-09-22 PROCEDURE — 84484 ASSAY OF TROPONIN QUANT: CPT | Performed by: PHYSICIAN ASSISTANT

## 2020-09-22 PROCEDURE — 94003 VENT MGMT INPAT SUBQ DAY: CPT

## 2020-09-22 PROCEDURE — 99232 SBSQ HOSP IP/OBS MODERATE 35: CPT | Performed by: INTERNAL MEDICINE

## 2020-09-22 PROCEDURE — 85025 COMPLETE CBC W/AUTO DIFF WBC: CPT | Performed by: NURSE PRACTITIONER

## 2020-09-22 PROCEDURE — 80048 BASIC METABOLIC PNL TOTAL CA: CPT | Performed by: NURSE PRACTITIONER

## 2020-09-22 PROCEDURE — 99291 CRITICAL CARE FIRST HOUR: CPT | Performed by: INTERNAL MEDICINE

## 2020-09-22 PROCEDURE — 85610 PROTHROMBIN TIME: CPT | Performed by: NURSE PRACTITIONER

## 2020-09-22 PROCEDURE — 83735 ASSAY OF MAGNESIUM: CPT | Performed by: NURSE PRACTITIONER

## 2020-09-22 PROCEDURE — 94760 N-INVAS EAR/PLS OXIMETRY 1: CPT

## 2020-09-22 PROCEDURE — 80053 COMPREHEN METABOLIC PANEL: CPT | Performed by: NURSE PRACTITIONER

## 2020-09-22 RX ORDER — POTASSIUM CHLORIDE 20 MEQ/1
40 TABLET, EXTENDED RELEASE ORAL ONCE
Status: COMPLETED | OUTPATIENT
Start: 2020-09-22 | End: 2020-09-22

## 2020-09-22 RX ORDER — POTASSIUM CHLORIDE 14.9 MG/ML
20 INJECTION INTRAVENOUS ONCE
Status: COMPLETED | OUTPATIENT
Start: 2020-09-22 | End: 2020-09-22

## 2020-09-22 RX ORDER — POTASSIUM CHLORIDE 29.8 MG/ML
40 INJECTION INTRAVENOUS ONCE
Status: COMPLETED | OUTPATIENT
Start: 2020-09-22 | End: 2020-09-22

## 2020-09-22 RX ORDER — MAGNESIUM SULFATE HEPTAHYDRATE 40 MG/ML
2 INJECTION, SOLUTION INTRAVENOUS ONCE
Status: COMPLETED | OUTPATIENT
Start: 2020-09-22 | End: 2020-09-22

## 2020-09-22 RX ORDER — POTASSIUM CHLORIDE 20 MEQ/1
40 TABLET, EXTENDED RELEASE ORAL
Status: DISCONTINUED | OUTPATIENT
Start: 2020-09-22 | End: 2020-09-24 | Stop reason: HOSPADM

## 2020-09-22 RX ORDER — FUROSEMIDE 10 MG/ML
40 INJECTION INTRAMUSCULAR; INTRAVENOUS ONCE
Status: COMPLETED | OUTPATIENT
Start: 2020-09-22 | End: 2020-09-22

## 2020-09-22 RX ADMIN — ALBUMIN (HUMAN) 25 G: 0.25 INJECTION, SOLUTION INTRAVENOUS at 16:29

## 2020-09-22 RX ADMIN — MIDODRINE HYDROCHLORIDE 10 MG: 5 TABLET ORAL at 12:42

## 2020-09-22 RX ADMIN — OCTREOTIDE ACETATE 50 MCG/HR: 500 INJECTION, SOLUTION INTRAVENOUS; SUBCUTANEOUS at 10:51

## 2020-09-22 RX ADMIN — ALBUMIN (HUMAN) 25 G: 0.25 INJECTION, SOLUTION INTRAVENOUS at 22:45

## 2020-09-22 RX ADMIN — RIFAXIMIN 550 MG: 550 TABLET ORAL at 20:34

## 2020-09-22 RX ADMIN — POTASSIUM CHLORIDE 40 MEQ: 29.8 INJECTION, SOLUTION INTRAVENOUS at 17:59

## 2020-09-22 RX ADMIN — FUROSEMIDE 40 MG: 10 INJECTION, SOLUTION INTRAMUSCULAR; INTRAVENOUS at 11:14

## 2020-09-22 RX ADMIN — LACTULOSE 20 G: 10 SOLUTION ORAL at 08:51

## 2020-09-22 RX ADMIN — OCTREOTIDE ACETATE 50 MCG/HR: 500 INJECTION, SOLUTION INTRAVENOUS; SUBCUTANEOUS at 22:45

## 2020-09-22 RX ADMIN — MAGNESIUM SULFATE IN WATER 2 G: 40 INJECTION, SOLUTION INTRAVENOUS at 05:58

## 2020-09-22 RX ADMIN — POTASSIUM CHLORIDE 40 MEQ: 20 TABLET, EXTENDED RELEASE ORAL at 17:59

## 2020-09-22 RX ADMIN — PANTOPRAZOLE SODIUM 40 MG: 40 TABLET, DELAYED RELEASE ORAL at 16:29

## 2020-09-22 RX ADMIN — POTASSIUM CHLORIDE 20 MEQ: 200 INJECTION, SOLUTION INTRAVENOUS at 05:59

## 2020-09-22 RX ADMIN — POTASSIUM & SODIUM PHOSPHATES POWDER PACK 280-160-250 MG 2 PACKET: 280-160-250 PACK at 06:38

## 2020-09-22 RX ADMIN — POTASSIUM & SODIUM PHOSPHATES POWDER PACK 280-160-250 MG 2 PACKET: 280-160-250 PACK at 16:29

## 2020-09-22 RX ADMIN — OCTREOTIDE ACETATE 50 MCG/HR: 500 INJECTION, SOLUTION INTRAVENOUS; SUBCUTANEOUS at 00:27

## 2020-09-22 RX ADMIN — MIDODRINE HYDROCHLORIDE 10 MG: 5 TABLET ORAL at 05:56

## 2020-09-22 RX ADMIN — POTASSIUM CHLORIDE 40 MEQ: 1500 TABLET, EXTENDED RELEASE ORAL at 06:38

## 2020-09-22 RX ADMIN — ALBUMIN (HUMAN) 25 G: 0.25 INJECTION, SOLUTION INTRAVENOUS at 04:30

## 2020-09-22 RX ADMIN — RIFAXIMIN 550 MG: 550 TABLET ORAL at 08:50

## 2020-09-22 RX ADMIN — MIDODRINE HYDROCHLORIDE 10 MG: 5 TABLET ORAL at 16:29

## 2020-09-22 RX ADMIN — ALBUMIN (HUMAN) 25 G: 0.25 INJECTION, SOLUTION INTRAVENOUS at 10:51

## 2020-09-22 RX ADMIN — PANTOPRAZOLE SODIUM 40 MG: 40 TABLET, DELAYED RELEASE ORAL at 05:55

## 2020-09-22 RX ADMIN — FLUDROCORTISONE ACETATE 0.05 MG: 0.1 TABLET ORAL at 08:50

## 2020-09-22 NOTE — PROGRESS NOTES
Progress Note - Carmita Parker 1955, 76 y o  male MRN: [id number]    Unit/Bed#: -01 Encounter: [id number]    Primary Care Provider: No primary care provider on file     Date and time admitted to hospital: 9/17/2020  1:08 PM        * [facility])  Assessment & Plan  · Secondary to low oncotic pressure in setting of cirrhosis vs sepsis PNA  · CT chest - b/l GGO with R>L pleural effusions  · UA - positive nitrites only, does not appear to be UTI  · Blood culture - 1/2 staph coag, repeat NG48  · S/p paracentesis -negative for SBP  · Procalcitonin 1 0, 0 92, 0 39, 0 39  · Pt with refractory hypotension following paracentesis, central line and arterial line placed  · Levo off 9/21  · SBP goal >90   · Cont scheduled Albumin and Midodrine    MILLER with portal HTN/ascites/varicies  Assessment & Plan  · Pt sees Dr Ricki Mohs of [facility] as outpatient  · PT is scheduled for transplant evaluation with Dr Milady Banegas when a bed is available   · MELD 35 - consistent with 52% 3 month mortality  · GI following  · Cont lactulose and Rifaximin  · Cont Octreotide infusion  · 9/18 S/p paracentesis with 3 2L fluid removed, negative for SBP    Acute respiratory failure with hypoxia ([facility] [address] )  Assessment & Plan  · Secondary to volume overload in setting of sepsis IVF administration with known decompensated cirrhosis  · Continue HFNC for persistent hypoxia   · Lasix infusion transitioned to lasix 40 IV BID per renal   · Cont Atrovent/Xopenex and Mucinex  · Goal SpO2 >92%    SAMANTHA (acute kidney injury) ([facility] [address] )  Assessment & Plan  · Baseline creatinine 1 08  · Current creat 1 19  · Likely secondary to hepatorenal failure  · Nephrology following  · Lasix infusion transitioned to lasix 40 IV BID   · Avoid nephrotoxic medications    Coagulopathy (HCC)  Assessment & Plan  · 2/2 liver failure  · INR trending up - currently 4 83 despite vitamin K yesterday   · Cont to trend INR  · No active bleeding     Hyperbilirubinemia  Assessment & Plan  · 2/2 advanced liver disease  · Cont to trend LFT's     Hyponatremia  Assessment & Plan  · Secondary to cirrhosis  · Urine Na 44  · Nephrology following  · Cont diuresis with Lasix  · Current sodium 136    Thrombocytopenia (HCC)  Assessment & Plan  · Secondary to decompensated cirrhosis  · Cont to trend plt  · Transfuse if <20 or signs of active bleeding     Anemia  Assessment & Plan  · Unknown baseline, Hgb on admission 12, now trending 9 s/p IVF resuscitation   · Cont to monitor  · Transfuse if <7, caution with transfusion as pt is transplant candidate     Non-obstructive hypertrophic cardiomyopathy ([address] )  Assessment & Plan  · Pt with mitral murmur on initial exam  · Previous Echo with severe non-obstructive hypertrophic cardiomyopathy - EF 70%  · Repeat echo - EF 60%, G2DD, mod MVR, mild AS, no regional wall abnormalities  · Hold Nadolol due to hypotension - avoid tachycardia   · Continue midodrine for hypotension     Accidental fall  Assessment & Plan  · Pt with controlled fall to ground  · No LOC  · Head ct read as "possible small subarachnoid hemorrhage" - Discussion with neurosurgery -  They feel it is artifact  · Neuro exams  · No significant fractures on imaging  · Left thigh ecchymosis and swelling - improved since admission     Abnormal CT scan of head  Assessment & Plan  · In the ED - CT head 3mm questionable small SAH  · Neurosurgery reviewed CT, feels this is artifact  · No further imaging or work up warranted     Low magnesium level  Assessment & Plan  · Secondary to diuresis and hypokalemia  · Replete as needed       ----------------------------------------------------------------------------------------  HPI/24hr events: Patient has remained off of pressors since early yesterday am   He continues on HFNC 65%/50L and has been hemodynamically stable  Overnight he did have an episode of "chest pain"  EKG was unchanged and his troponin was 0 26    Earlier this admission, his troponin peaked at 0 86   This morning the patient denies any chest pain  Plan is for him to be transferred to [facility] for an evaluation of a liver transplant  Disposition: Continue Stepdown Level 1 level of care   Code Status: Level 1 - Full Code  ---------------------------------------------------------------------------------------  SUBJECTIVE  "I feel okay  I slept really well "    Review of Systems   Constitutional: Positive for fatigue  HENT: Negative  Eyes: Negative  Respiratory: Positive for shortness of breath  Cardiovascular: Positive for leg swelling  Gastrointestinal: Negative  Endocrine: Negative  Genitourinary: Negative  Musculoskeletal: Negative  Skin: Negative  Allergic/Immunologic: Negative  Neurological: Positive for weakness  Hematological: Negative  Psychiatric/Behavioral: Negative  Review of systems was reviewed and negative unless stated above in HPI/24-hour events   ---------------------------------------------------------------------------------------  OBJECTIVE    Vitals   Vitals:    20 0400 20 0500 20 0556 20 0709   BP: 97/54 117/61  114/70   BP Location:    Left arm   Pulse: 69 61  64   Resp: 13 13  14   Temp:    97 7 °F (36 5 °C)   TempSrc:    Oral   SpO2: 91% 93%  92%   Weight:   107 kg (235 lb 0 2 oz)    Height:         Temp (24hrs), Av 8 °F (36 6 °C), Min:97 6 °F (36 4 °C), Max:97 9 °F (36 6 °C)  Current: Temperature: 97 7 °F (36 5 °C)  Arterial Line BP: 126/53  Arterial Line MAP (mmHg): 79 mmHg    Respiratory:  SpO2: SpO2: 92 %  Nasal Cannula O2 Flow Rate (L/min): 10 L/min    Invasive/non-invasive ventilation settings   Respiratory    Lab Data (Last 4 hours)    None         O2/Vent Data (Last 4 hours)       0400          Non-Invasive Ventilation Mode HFNC                   Physical Exam  Vitals signs and nursing note reviewed  Constitutional:       Appearance: He is obese  He is ill-appearing     HENT:      Head: Normocephalic and atraumatic  Right Ear: Tympanic membrane, ear canal and external ear normal       Left Ear: Tympanic membrane, ear canal and external ear normal       Nose: Nose normal       Mouth/Throat:      Mouth: Mucous membranes are dry  Pharynx: Oropharynx is clear  Eyes:      Extraocular Movements: Extraocular movements intact  Pupils: Pupils are equal, round, and reactive to light  Comments: Conjunctivae yellow   Neck:      Musculoskeletal: Normal range of motion and neck supple  Cardiovascular:      Rate and Rhythm: Regular rhythm  Pulses: Normal pulses  Heart sounds: Normal heart sounds  Pulmonary:      Comments: B/L breath sounds coarse  Abdominal:      General: Bowel sounds are normal       Palpations: Abdomen is soft  Genitourinary:     Comments: Urinary catheter   Musculoskeletal:      Comments: Generalized weakness    Skin:     Capillary Refill: Capillary refill takes less than 2 seconds  Coloration: Skin is jaundiced  Comments: + 2 edema LE    Neurological:      Mental Status: He is alert and oriented to person, place, and time  Mental status is at baseline     Psychiatric:         Mood and Affect: Mood normal          Laboratory and Diagnostics:  Results from last 7 days   Lab Units 09/22/20  0438 09/21/20  1606 09/21/20  0634 09/20/20  0518 09/19/20  0845 09/19/20  0505 09/18/20  1434  09/18/20  0508 09/17/20  1846 09/17/20  1320   WBC Thousand/uL 9 42  --  8 94 9 09  --  10 29*  --   --  13 40*  --  15 48*   HEMOGLOBIN g/dL 8 5*  --  8 7* 8 6*  --  8 2*  --    < > 9 4*  --  12 1   I STAT HEMOGLOBIN g/dl  --  11 6*  --   --  8 5*  --  9 2*  --   --   --   --    HEMATOCRIT % 25 1*  --  24 7* 25 2*  --  24 4*  --    < > 27 1*  --  34 9*   HEMATOCRIT, ISTAT %  --  34*  --   --  25*  --  27*  --   --   --   --    PLATELETS Thousands/uL 53*  --  44* 54*  --  56*  --   --  57* 63* 82*   NEUTROS PCT % 66  --  69 75  --   --   --   --  75  --   --    BANDS PCT %  --   --   --   --   --   --   --   --   --   --  5   MONOS PCT % 9  --  7 6  --   --   --   --  8  --   --    MONO PCT %  --   --   --   --   --   --   --   --   --   --  4    < > = values in this interval not displayed  Results from last 7 days   Lab Units 09/22/20  0437 09/21/20  1754 09/21/20  1606 09/21/20  1158 09/21/20  0718 09/21/20  0005 09/20/20  1806 09/20/20  1142 09/20/20  0731 09/20/20  0521  09/19/20  0505  09/18/20  0508  09/17/20  1320   SODIUM mmol/L 136 133*  --  135* 136 135* 135* 135* 135* 136   < > 131*   < > 127*   < > 124*   POTASSIUM mmol/L 3 0* 3 8  --  3 2* 3 5 3 5 3 3* 3 3* 3 3* 3 2*   < > 3 9   < > 4 7   < > 5 0   CHLORIDE mmol/L 95* 95*  --  98* 95* 96* 96* 95* 95* 96*   < > 98*   < > 98*   < > 94*   CO2 mmol/L 34* 31  --  29 32 32 29 30 30 29   < > 23   < > 23   < > 21   CO2, I-STAT mmol/L  --   --  34*  --   --   --   --   --   --   --    < >  --    < >  --   --   --    ANION GAP mmol/L 7 7  --  8 9 7 10 10 10 11   < > 10   < > 6   < > 9   BUN mg/dL 33* 43*  --  35* 36* 38* 38* 40* 39* 40*   < > 41*   < > 38*   < > 36*   CREATININE mg/dL 1 19 1 41*  --  1 42* 1 27 1 38* 1 52* 1 60* 1 56* 1 59*   < > 1 84*   < > 1 89*   < > 1 97*   CALCIUM mg/dL 8 5 8 8  --  8 2* 8 3 8 2* 8 4 8 0* 7 7* 7 9*   < > 8 0*   < > 7 9*   < > 8 1*   GLUCOSE RANDOM mg/dL 101 191*  --  159* 108 113 102 130 128 128   < > 120   < > 58*   < > 75   ALT U/L 49  --   --   --  59  --   --   --  71 70  --  67  --  71  --  99*   AST U/L 114*  --   --   --  137*  --   --   --  170* 165*  --  163*  --  173*  --  238*   ALK PHOS U/L 76  --   --   --  83  --   --   --  92 90  --  211*  --  122*  --  144*   ALBUMIN g/dL 3 3*  --   --   --  3 6  --   --   --  3 2* 3 2*  --  2 6*  --  1 1*  --  1 4*   TOTAL BILIRUBIN mg/dL 18 40*  --   --   --  16 80*  --   --   --  15 00* 14 30*  --  11 30*  --  7 70*  --  9 50*    < > = values in this interval not displayed       Results from last 7 days   Lab Units 09/22/20  0433 09/21/20  1158 09/20/20  0745 09/19/20  1129 09/18/20  2221 09/18/20  0508 09/17/20  1320   MAGNESIUM mg/dL 1 8 1 5* 2 0 2 2 1 9 1 9 2 1   PHOSPHORUS mg/dL 2 1*  --   --   --   --  3 9  --       Results from last 7 days   Lab Units 09/22/20  0438 09/21/20  0634 09/20/20  1539 09/20/20  0518 09/19/20  0505 09/18/20  0656 09/18/20  0508 09/17/20  1320   INR  4 83* 4 19* 4 58* 4 89* 3 76* 3 82* 3 07* 3 44*   PTT seconds  --   --   --   --   --   --   --  75*      Results from last 7 days   Lab Units 09/22/20  0438 09/18/20  1616 09/18/20  0508 09/18/20  0014 09/17/20  2058 09/17/20  1846 09/17/20  1320   TROPONIN I ng/mL 0 26* 0 86* 0 61* 0 42* 0 38* 0 31* 0 29*     Results from last 7 days   Lab Units 09/18/20  1616 09/18/20  0508 09/18/20  0014 09/17/20  1536 09/17/20  1335   LACTIC ACID mmol/L 2 4* 2 9* 3 1* 5 1* 5 8*     ABG:    VBG:    Results from last 7 days   Lab Units 09/21/20  1158 09/20/20  0518 09/19/20  0505 09/18/20  0508   PROCALCITONIN ng/ml 0 39* 0 39* 0 92* 1 03*       Micro  Results from last 7 days   Lab Units 09/18/20  1617 09/18/20  1610 09/18/20  1420 09/17/20  1537 09/17/20  1406 09/17/20  1335   BLOOD CULTURE  No Growth at 72 hrs  No Growth at 72 hrs   --   --  No Growth After 4 Days  Staphylococcus coagulase negative*   GRAM STAIN RESULT   --   --  1+ Polys  No bacteria seen  --   --  Gram positive cocci in clusters*   URINE CULTURE   --   --   --  No Growth <1000 cfu/mL  --   --    BODY FLUID CULTURE, STERILE   --   --  No growth  --   --   --        EKG: NSR   Imaging: I have personally reviewed pertinent reports  Xr Chest Portable    Result Date: 9/19/2020  Impression: Unchanged bilateral consolidations--edema or multifocal pneumonia  Question tiny right pleural effusion  Workstation performed: [id number]     Xr Chest 1 View Portable    Result Date: 9/17/2020  Impression: One of 2 images with bilateral ground glass opacity    As the other image is clear, this is presumably artifactual  No acute trauma  Workstation performed: [id number]     Trauma - Ct Head Wo Contrast    Result Date: 9/17/2020  Impression: 1  Tiny 3 mm focus of high attenuation in the medial anterior left frontal lobe possibly a small subarachnoid hemorrhage  No abnormal mass effect  2   No other acute intracranial abnormality noted  I personally discussed this study with Justyna Brasher on 9/17/2020 at 3:30 PM  Workstation performed: [id number]     Trauma - Ct Spine Cervical Wo Contrast    Result Date: 9/17/2020  Impression: No cervical spine fracture or traumatic malalignment  I personally discussed this study with Justyna Brasher on 9/17/2020 at 3:30 PM   Workstation performed: [id number]     Cta Lower Extremity Left W Wo Contrast    Result Date: 9/17/2020  Impression: Normal, although limited, left lower extremity arteriogram with three-vessel runoff to the foot  Moderate ascites  Diffuse subcutaneous edema in the lower extremities  Left testicle in the distal inguinal canal   Correlate clinically to include a nondistended testicle  Workstation performed: [id number]     Ir In-patient Paracentesis    Result Date: 9/21/2020  Impression: Impression: Successful ultrasound-guided paracentesis  Workstation performed: [id number]     Ir Non-tunneled Central Line Placement    Result Date: 9/21/2020  Impression: Impression: Successful ultrasound guided placement of triple lumen central line via the right internal jugular vein  Tip of the catheter was subsequently imaged with x-ray and found to be just below the cavoatrial junction in appropriate position and ready for use  Workstation performed: [id number]     Trauma - Ct Chest Abdomen Pelvis W Contrast    Result Date: 9/17/2020  Impression: 1  Questionable minimally displaced fracture the anterior left 9th rib  2   No other definite acute abnormality noted in the chest, abdomen or pelvis   3   Stranding in subcutaneous soft tissues of the left lateral lower thoracic and left abdominal wall likely related to the patient's trauma and/or prolong lying on the floor in the left lateral decubitus position  No hematoma noted  4  Cardiomegaly with scattered groundglass opacities in both lungs likely related to edema although infectious/inflammatory process, including Covid not entirely excludable  5   Small right greater than left pleural effusions which may be due to volume overload and/or 3rd spacing in this patient with cirrhosis  6   Cirrhotic changes in the liver with evidence for portal hypertension manifest as large gastric moderate sized esophageal varices as well as moderate amount of abdominal pelvic ascites  7   Status post previous a large infrarenal abdominal aortic aneurysm which is of stable size  8   Additional findings as noted  I personally discussed this study with Ari Zuniag on 9/17/2020 at 3:30 PM  Workstation performed: [id number]     Xr Chest Portable Icu    Result Date: 9/21/2020  Impression: Near complete resolution of bilateral groundglass opacity  Workstation performed: [id number]     Xr Chest Portable Icu    Result Date: 9/20/2020  Impression: No change in bilateral ground glass opacity from one day earlier which could be due to edema or atypical pneumonia  Workstation performed: [id number]     Xr Chest Portable Icu    Result Date: 9/18/2020  Impression: Right jugular catheter at cavoatrial junction with no pneumothorax  Extensive new bilateral consolidation which could be due to edema or pneumonia  The study was marked in [facility] for immediate notification  Workstation performed: [id number]     Xr Chest Portable Icu    Result Date: 9/18/2020  Impression: No acute cardiopulmonary disease  Low lung volumes with mild bibasilar atelectasis  Workstation performed: [id number]     Us Right Upper Quadrant With Liver Dopplers    Result Date: 9/18/2020  Impression: Cirrhotic liver  Unfortunately the hepatic vasculature could not be evaluated due to technical limitations  Ascites  Workstation performed: [id number]     Intake and Output  I/O       09/20 0701 - 09/21 0700 09/21 0701 - 09/22 0700 09/22 0701 - 09/23 0700    P  O  680 840     I V  (mL/kg) 747 3 (7) 446 2 (4 2)     IV Piggyback 914 2 450     Total Intake(mL/kg) 2341 4 (22 1) 1736 2 (16 2)     Urine (mL/kg/hr) 8425 (3 3) 3450 (1 3)     Stool 4 0     Total Output 8429 3450     Net -6087 6 -1713 8            Unmeasured Stool Occurrence 9 x 4 x           Height and Weights   Height: 5' 10" (177 8 cm)  IBW: 73 kg  Body mass index is 33 72 kg/m²  Weight (last 2 days)     Date/Time   Weight    09/22/20 0556   107 (235 01)    09/21/20 0600   106 (232 81)    09/20/20 0600   118 (260 36)                Nutrition       Diet Orders   (From admission, onward)             Start     Ordered    09/21/20 0948  Diet GI; Lo Fat; Fluid Restriction 1500 ML, Sodium 2 GM  Diet effective now     Question Answer Comment   Diet Type GI    GI Lo Fat    Other Restriction(s): Fluid Restriction 1500 ML    Other Restriction(s): Sodium 2 GM    RD to adjust diet per protocol?  Yes        09/21/20 0947                  Active Medications  Scheduled Meds:  Current Facility-Administered Medications   Medication Dose Route Frequency Provider Last Rate    albumin human  25 g Intravenous Q6H ELIZABETH Castillo Stopped (09/22/20 0515)    albuterol  2 5 mg Nebulization Q4H PRN Aaron Roque MD      fludrocortisone  0 05 mg Oral Daily GuillerminaELIZABETH Gama      lactulose  20 g Oral Daily ELIZABETH Haji      magnesium sulfate  2 g Intravenous Once Jo Tesfaye PA-C      midodrine  10 mg Oral TID AC ELIZABETH Tidwell      octreotide  50 mcg/hr Intravenous Continuous ELIZABETH Castillo 50 mcg/hr (09/22/20 0027)    pantoprazole  40 mg Oral BID AC GuillerminaELIZABETH Gama      potassium chloride  40 mEq Oral Daily With Breakfast Jo Tesfaye PA-C      potassium chloride  20 mEq Intravenous Once Jo Tesfaye PA-C 20 mEq (09/22/20 0559)    potassium-sodium phosphates  2 packet Oral BID With Meals Jo Tesfaye PA-C      rifaximin  550 mg Oral Q12H [address] ELIZABETH Campbell       Continuous Infusions:  octreotide, 50 mcg/hr, Last Rate: 50 mcg/hr (09/22/20 0027)      PRN Meds:   albuterol, 2 5 mg, Q4H PRN        Invasive Devices Review  Invasive Devices     Central Venous Catheter Line            CVC Central Lines 09/18/20 Right Internal jugular 3 days          Drain            Urethral Catheter Double-lumen 16 Fr  4 days              ---------------------------------------------------------------------------------------  Care Time Delivered:   No Critical Care time spent       ELIZABETH Hope      Portions of the record may have been created with voice recognition software  Occasional wrong word or "sound a like" substitutions may have occurred due to the inherent limitations of voice recognition software    Read the chart carefully and recognize, using context, where substitutions have occurred

## 2020-09-22 NOTE — ASSESSMENT & PLAN NOTE
· Secondary to low oncotic pressure in setting of cirrhosis vs sepsis PNA  · CT chest - b/l GGO with R>L pleural effusions  · UA - positive nitrites only, does not appear to be UTI  · Blood culture - 1/2 staph coag, repeat NG48  · S/p paracentesis -negative for SBP  · Procalcitonin 1 0, 0 92, 0 39, 0 39  · Pt with refractory hypotension following paracentesis, central line and arterial line placed  · Levo off 9/21  · SBP goal >90   · Cont scheduled Albumin and Midodrine

## 2020-09-22 NOTE — ASSESSMENT & PLAN NOTE
· 2/2 liver failure  · INR trending up - currently 4 83 despite vitamin K yesterday   · Cont to trend INR  · No active bleeding

## 2020-09-22 NOTE — PROGRESS NOTES
Progress note - Gastroenterology   Gary Rowe 76 y o  male MRN: [id number]  Unit/Bed#: -01 Encounter: [id number]    ASSESSMENT and PLAN  1  MILLER cirrhosis, MELD 35, decompensated by hepatic encephalopathy, coagulopathy, ascites, thrombocytopenia, esophageal varices admitted with sepsis like picture, hypotensive and in acute respiratory failure with lactic acidosis   Volume overloaded, on diuretics with good urinary output   Paracentesis negative for SBP   Ultrasound negative for HCC   CT negative for portal vein thrombus  2  Hypoxic respiratory failure - off BiPAP and on high-flow nasal cannula  3  Chronic liver disease in acute liver failure    MELD 37 today    - Tolerating diet to 2 g sodium  - continue rifaximin and lactulose  - continue lasix and monitor urine output  - [facility]wait [facility] to [facility]      Chief Complaint   Patient presents with    Weakness - Generalized     patient complaint of "loosing balance and guided self to the floor,landed on buttocks, denies loc, denies head injury, increasing weakness last month       SUBJECTIVE/HPI   NO COMPLAINTS  TOLERATING DIET    /70 (BP Location: Left arm)   Pulse 64   Temp 97 7 °F (36 5 °C) (Oral)   Resp 14   Ht 5' 10" (1 778 m)   Wt 107 kg (235 lb 0 2 oz)   SpO2 92%   BMI 33 72 kg/m²     PHYSICALEXAM  General appearance: alert, appears stated age and cooperative  Eyes: PERLLA, EOMI, + icterus   Head: Normocephalic, without obvious abnormality, atraumatic  Lungs: clear to auscultation bilaterally  Heart: regular rate and rhythm, S1, S2 normal, no murmur, click, rub or gallop  Abdomen: soft, non-tender; bowel sounds normal; no masses,  no organomegaly  Extremities:  1+ edema  Neurologic: Grossly normal, alert and orient x3    No asterixis    Lab Results   Component Value Date    GLUCOSE 122 09/21/2020    CALCIUM 8 5 09/22/2020    K 3 0 (L) 09/22/2020    CO2 34 (H) 09/22/2020    CL 95 (L) 09/22/2020    BUN 33 (H) 09/22/2020    CREATININE 1 19 09/22/2020     Lab Results   Component Value Date    WBC 9 42 09/22/2020    HGB 8 5 (L) 09/22/2020    HCT 25 1 (L) 09/22/2020     (H) 09/22/2020    PLT 53 (L) 09/22/2020     Lab Results   Component Value Date    ALT 49 09/22/2020     (H) 09/22/2020    ALKPHOS 76 09/22/2020       Lab Results   Component Value Date    IRON 73 09/20/2020    TIBC 83 (L) 09/20/2020    FERRITIN 1,467 (H) 09/20/2020     Lab Results   Component Value Date    INR 4 83 (H) 09/22/2020

## 2020-09-22 NOTE — PROGRESS NOTES
NEPHROLOGY PROGRESS NOTE   Carmita Parker 76 y o  male MRN: [id number]  Unit/Bed#: -01 Encounter: [id number]  Reason for Consult: SAMANTHA    ASSESSMENT/PLAN:  SAMANTHA:  Suspected secondary to hepatorenal syndrome with decrease in effective circulatory volume   -presents with creatinine 1 97   -creatinine improving    -baseline creatinine in 2018 around 1 0    -continues on triple therapy with midodrine, octreotide, and albumin  -previously on Lasix drip    -will give Lasix 40 mg IV x 1 now    -likely start Lasix/spironolactone combination    -status post IV contrast 9/17   -CK level trended down  -CT scan negative for hydro  -UA showed glucose, small blood, trace ketones, positive nitrites, trace protein, 30-50 WBCs  -urine culture negative   -continue Roberts catheter   -consent obtained for RRT and in chart if needed   -no urgent indication for RRT  -continue to avoid nephrotoxins, hypotension, IV contrast   -I/O      Hemodynamics:  Blood pressure is stable and acceptable  Presented with hypotension  Echo shows EF of 60% with grade II DD   -off of pressors    -avoid episodes of hypotension or high fluctuations in blood pressure   -recommend holding parameters for antihypertensive for systolic blood pressure less than 130 mmHg    -home antihypertensives on hold  -continues on IV albumin every 6 hours  -continue midodrine 10 mg t i d and florinef 0 05 mg po daily      Hypokalemia: In the setting of diuresis with Lasix drip and Hypomagnesemia  -Mag level 1 8   -continue to monitor replace as needed      Hyponatremia:  Likely secondary to cirrhosis  (resolved)  -continue fluid restriction  -urine osmolality 289, urine sodium 44, serum osmolality 295, normal a m  Cortisol  Hypophosphatemia:   -receiving replacement today  Continue to monitor       Acute respiratory failure with hypoxia: in the setting of suspected PNA   On hi flow O2    -repeat chest x-ray shows near resolution of B/L ground glass opacities  -CT scan showed small right greater then left pleural effusions    -continue intermittent diuretics       [place] cirrhosis:  With portal hypertension, ascites, and varices   -scheduled for transplant evaluation as outpatient   -awaiting bed availability at [facility]   -continues on lactulose, octreotide, and rifaximin   -s/p paracentesis for 3 2 L 9/18 of fluid removal   Negative for SBP   -discontinue Lasix gtt        Anemia with chronic Thrombocytopenia: In the setting of cirrhosis  -will continue to monitor and trend per primary team   -hemolysis smear negative for schistocytes or helmet cells  -iron panel shows normal iron sat and normal iron  Disposition:  Awaiting transfer to [facility]  SUBJECTIVE:  The patient is resting in his bed  He is on high-flow nasal cannula  He denies chest pain  He denies nausea, vomiting  He has a chronic diarrhea  He has a Roberts catheter  He is awaiting transfer to [facility]   He states that he has some gas today      OBJECTIVE:  Current Weight: Weight - Scale: 107 kg (235 lb 0 2 oz)  Vitals:    09/22/20 0709 09/22/20 0800 09/22/20 0841 09/22/20 0900   BP: 114/70 117/66  112/82   BP Location: Left arm      Pulse: 64 70  75   Resp: 14 (!) 23  18   Temp: 97 7 °F (36 5 °C)      TempSrc: Oral      SpO2: 92% 94% 91% (!) 89%   Weight:       Height:           Intake/Output Summary (Last 24 hours) at 9/22/2020 1015  Last data filed at 9/22/2020 0800  Gross per 24 hour   Intake 1954 93 ml   Output 3570 ml   Net -1615 07 ml     General: NAD  Skin: warm, dry, intact, jaundice  HEENT: Moist mucous membranes, sclera anicteric, normocephalic, atraumatic  Neck: No apparent JVD appreciated  Chest: lung sounds clear B/L, on RA   CVS:Regular rate and rhythm, no murmer   Abdomen: Soft, round, non-tender, slightly distended, +BS, Roberts catheter  Extremities: B/L LE edema present, left greater than right  Neuro: alert and oriented  Psych: appropriate mood and affect Medications:    Current Facility-Administered Medications:     albumin human (FLEXBUMIN) 25 % injection 25 g, 25 g, Intravenous, Q6H, ELIZABETH Castillo, Stopped at 09/22/20 0515    albuterol inhalation solution 2 5 mg, 2 5 mg, Nebulization, Q4H PRN, Cameron George MD    fludrocortisone (FLORINEF) tablet 0 05 mg, 0 05 mg, Oral, Daily, ELIZABETH Maldonado, 0 05 mg at 09/22/20 0850    lactulose 20 g/30 mL oral solution 20 g, 20 g, Oral, Daily, ELIZABETH Haji, 20 g at 09/22/20 0851    midodrine (PROAMATINE) tablet 10 mg, 10 mg, Oral, TID AC, ELIZABETH Tidwell, 10 mg at 09/22/20 0556    octreotide (SandoSTATIN) 500 mcg in sodium chloride 0 9 % 250 mL infusion, 50 mcg/hr, Intravenous, Continuous, ELIZABETH Castillo, Last Rate: 25 mL/hr at 09/22/20 0027, 50 mcg/hr at 09/22/20 0027    pantoprazole (PROTONIX) EC tablet 40 mg, 40 mg, Oral, BID AC, ELIZABETH Maldonado, 40 mg at 09/22/20 0555    potassium chloride (K-DUR,KLOR-CON) CR tablet 40 mEq, 40 mEq, Oral, Daily With Breakfast, Jo Tesfaye PA-C, 40 mEq at 09/22/20 3174    potassium-sodium phosphates (PHOS-NAK) packet 2 packet, 2 packet, Oral, BID With Meals, Jo Tesfaye PA-C, 2 packet at 09/22/20 0638    rifaximin (XIFAXAN) tablet 550 mg, 550 mg, Oral, Q12H [facility], ELIZABETH Campbell, 550 mg at 09/22/20 0850    Laboratory Results:  Results from last 7 days   Lab Units 09/22/20  0438 09/22/20  0437 09/21/20  1754 09/21/20  1606 09/21/20  1158 09/21/20  0718 09/21/20  0634  09/20/20  0745 09/20/20  0731  09/20/20  0518  09/19/20  0845  09/18/20  1434  09/18/20  0508   WBC Thousand/uL 9 42  --   --   --   --   --  8 94  --   --   --   --  9 09  --   --    < >  --   --  13 40*   HEMOGLOBIN g/dL 8 5*  --   --   --   --   --  8 7*  --   --   --   --  8 6*  --   --    < >  --    < > 9 4*   I STAT HEMOGLOBIN g/dl  --   --   --  11 6*  --   --   --   --   --   --   --   --   --  8 5*  --  9 2*   < >  -- HEMATOCRIT % 25 1*  --   --   --   --   --  24 7*  --   --   --   --  25 2*  --   --    < >  --    < > 27 1*   HEMATOCRIT, ISTAT %  --   --   --  34*  --   --   --   --   --   --   --   --   --  25*  --  27*   < >  --    PLATELETS Thousands/uL 53*  --   --   --   --   --  44*  --   --   --   --  54*  --   --    < >  --   --  57*   SODIUM mmol/L  --  136 133*  --  135* 136  --    < >  --  135*   < >  --    < >  --    < >  --   --  127*   POTASSIUM mmol/L  --  3 0* 3 8  --  3 2* 3 5  --    < >  --  3 3*   < >  --    < >  --    < >  --   --  4 7   CHLORIDE mmol/L  --  95* 95*  --  98* 95*  --    < >  --  95*   < >  --    < >  --    < >  --   --  98*   CO2 mmol/L  --  34* 31  --  29 32  --    < >  --  30   < >  --    < >  --    < >  --   --  23   CO2, I-STAT mmol/L  --   --   --  34*  --   --   --   --   --   --   --   --   --  23  --  22   < >  --    BUN mg/dL  --  33* 43*  --  35* 36*  --    < >  --  39*   < >  --    < >  --    < >  --   --  38*   CREATININE mg/dL  --  1 19 1 41*  --  1 42* 1 27  --    < >  --  1 56*   < >  --    < >  --    < >  --   --  1 89*   CALCIUM mg/dL  --  8 5 8 8  --  8 2* 8 3  --    < >  --  7 7*   < >  --    < >  --    < >  --   --  7 9*   MAGNESIUM mg/dL  --  1 8  --   --  1 5*  --   --   --  2 0  --   --   --    < >  --    < >  --   --  1 9   PHOSPHORUS mg/dL  --  2 1*  --   --   --   --   --   --   --   --   --   --   --   --   --   --   --  3 9   ALK PHOS U/L  --  76  --   --   --  83  --   --   --  92   < >  --   --   --    < >  --   --  122*   ALT U/L  --  49  --   --   --  59  --   --   --  71   < >  --   --   --    < >  --   --  71   AST U/L  --  114*  --   --   --  137*  --   --   --  170*   < >  --   --   --    < >  --   --  173*   GLUCOSE, ISTAT mg/dl  --   --   --  122  --   --   --   --   --   --   --   --   --  117  --  97  --   --     < > = values in this interval not displayed

## 2020-09-22 NOTE — ASSESSMENT & PLAN NOTE
· Secondary to cirrhosis  · Urine Na 44  · Nephrology following  · Cont diuresis with Lasix  · Current sodium 136

## 2020-09-22 NOTE — QUICK NOTE
Called [facility] for status on transfer  Spoke with Hernando Rivera, RN who wanted to confirm that the patient is okay for a step down bed  Confirmed that step down is appropriate since he is only on high flow nasal cannula and octreotide  Hernando Rivera stated although beds are still tight, she is hoping something will open up tonight  She will get back to us when they do  In the meantime, the patient is starting to cough up bloody secretions  INR today was 4 83  Dr Dacia Watts from GI and ICU attending Dr Ranjan Parish updated

## 2020-09-22 NOTE — QUICK NOTE
Update given to wife at the bedside  Followed up with Dr Jose Cassidy from  to let him know that there has been no further update today regarding the transfer to [facility]  Dr Jose Cassidy was able to get ahold of Dr Faye Gentile from [facility] who stated that they are still working on the transfer and beds are tight but he is a high priority  Will await for further information

## 2020-09-22 NOTE — ASSESSMENT & PLAN NOTE
· Baseline creatinine 1 08  · Current creat 1 19  · Likely secondary to hepatorenal failure  · Nephrology following  · Lasix infusion transitioned to lasix 40 IV BID   · Avoid nephrotoxic medications

## 2020-09-22 NOTE — ASSESSMENT & PLAN NOTE
· Pt sees Dr Kimble Shadow of [facility] GI as outpatient  · PT is scheduled for transplant evaluation with Dr Jake Torres when a bed is available   · MELD 35 - consistent with 52% 3 month mortality  · GI following  · Cont lactulose and Rifaximin  · Cont Octreotide infusion  · 9/18 S/p paracentesis with 3 2L fluid removed, negative for SBP

## 2020-09-22 NOTE — ASSESSMENT & PLAN NOTE
· Secondary to volume overload in setting of sepsis IVF administration with known decompensated cirrhosis  · Continue HFNC for persistent hypoxia   · Lasix infusion transitioned to lasix 40 IV BID per renal   · Cont Atrovent/Xopenex and Mucinex  · Goal SpO2 >92%

## 2020-09-23 ENCOUNTER — APPOINTMENT (INPATIENT)
Dept: CT IMAGING | Facility: HOSPITAL | Age: 65
DRG: 871 | End: 2020-09-23
Payer: MEDICARE

## 2020-09-23 VITALS
HEIGHT: 70 IN | SYSTOLIC BLOOD PRESSURE: 106 MMHG | WEIGHT: 235.01 LBS | OXYGEN SATURATION: 88 % | DIASTOLIC BLOOD PRESSURE: 65 MMHG | RESPIRATION RATE: 25 BRPM | HEART RATE: 81 BPM | TEMPERATURE: 98.4 F | BODY MASS INDEX: 33.64 KG/M2

## 2020-09-23 PROBLEM — E83.39 HYPOPHOSPHATEMIA: Status: RESOLVED | Noted: 2020-09-22 | Resolved: 2020-09-23

## 2020-09-23 PROBLEM — R79.0 LOW MAGNESIUM LEVEL: Status: RESOLVED | Noted: 2020-09-21 | Resolved: 2020-09-23

## 2020-09-23 PROBLEM — E87.1 HYPONATREMIA: Status: RESOLVED | Noted: 2020-09-18 | Resolved: 2020-09-23

## 2020-09-23 LAB
ALBUMIN SERPL BCP-MCNC: 3.6 G/DL (ref 3.5–5)
ALP SERPL-CCNC: 72 U/L (ref 46–116)
ALT SERPL W P-5'-P-CCNC: 40 U/L (ref 12–78)
ANION GAP SERPL CALCULATED.3IONS-SCNC: 6 MMOL/L (ref 4–13)
ANION GAP SERPL CALCULATED.3IONS-SCNC: 7 MMOL/L (ref 4–13)
AST SERPL W P-5'-P-CCNC: 100 U/L (ref 5–45)
BACTERIA BLD CULT: NORMAL
BACTERIA BLD CULT: NORMAL
BASOPHILS # BLD AUTO: 0.05 THOUSANDS/ΜL (ref 0–0.1)
BASOPHILS NFR BLD AUTO: 1 % (ref 0–1)
BILIRUB SERPL-MCNC: 20.9 MG/DL (ref 0.2–1)
BUN SERPL-MCNC: 34 MG/DL (ref 5–25)
BUN SERPL-MCNC: 36 MG/DL (ref 5–25)
CALCIUM SERPL-MCNC: 8.4 MG/DL (ref 8.3–10.1)
CALCIUM SERPL-MCNC: 8.8 MG/DL (ref 8.3–10.1)
CHLORIDE SERPL-SCNC: 96 MMOL/L (ref 100–108)
CHLORIDE SERPL-SCNC: 97 MMOL/L (ref 100–108)
CO2 SERPL-SCNC: 32 MMOL/L (ref 21–32)
CO2 SERPL-SCNC: 33 MMOL/L (ref 21–32)
CREAT SERPL-MCNC: 1.21 MG/DL (ref 0.6–1.3)
CREAT SERPL-MCNC: 1.24 MG/DL (ref 0.6–1.3)
EOSINOPHIL # BLD AUTO: 0.21 THOUSAND/ΜL (ref 0–0.61)
EOSINOPHIL NFR BLD AUTO: 2 % (ref 0–6)
ERYTHROCYTE [DISTWIDTH] IN BLOOD BY AUTOMATED COUNT: 17.8 % (ref 11.6–15.1)
GFR SERPL CREATININE-BSD FRML MDRD: 60 ML/MIN/1.73SQ M
GFR SERPL CREATININE-BSD FRML MDRD: 62 ML/MIN/1.73SQ M
GLUCOSE SERPL-MCNC: 102 MG/DL (ref 65–140)
GLUCOSE SERPL-MCNC: 95 MG/DL (ref 65–140)
HCT VFR BLD AUTO: 23.9 % (ref 36.5–49.3)
HEMOCCULT STL QL: POSITIVE
HGB BLD-MCNC: 8.1 G/DL (ref 12–17)
IMM GRANULOCYTES # BLD AUTO: 0.21 THOUSAND/UL (ref 0–0.2)
IMM GRANULOCYTES NFR BLD AUTO: 2 % (ref 0–2)
INR PPP: 5.01 (ref 0.84–1.19)
LYMPHOCYTES # BLD AUTO: 1.87 THOUSANDS/ΜL (ref 0.6–4.47)
LYMPHOCYTES NFR BLD AUTO: 19 % (ref 14–44)
MAGNESIUM SERPL-MCNC: 1.7 MG/DL (ref 1.6–2.6)
MAGNESIUM SERPL-MCNC: 2 MG/DL (ref 1.6–2.6)
MCH RBC QN AUTO: 37.9 PG (ref 26.8–34.3)
MCHC RBC AUTO-ENTMCNC: 33.9 G/DL (ref 31.4–37.4)
MCV RBC AUTO: 112 FL (ref 82–98)
MONOCYTES # BLD AUTO: 0.81 THOUSAND/ΜL (ref 0.17–1.22)
MONOCYTES NFR BLD AUTO: 8 % (ref 4–12)
NEUTROPHILS # BLD AUTO: 6.77 THOUSANDS/ΜL (ref 1.85–7.62)
NEUTS SEG NFR BLD AUTO: 68 % (ref 43–75)
NRBC BLD AUTO-RTO: 0 /100 WBCS
PHOSPHATE SERPL-MCNC: 2.1 MG/DL (ref 2.3–4.1)
PHOSPHATE SERPL-MCNC: 2.3 MG/DL (ref 2.3–4.1)
PLATELET # BLD AUTO: 44 THOUSANDS/UL (ref 149–390)
PMV BLD AUTO: 10.6 FL (ref 8.9–12.7)
POTASSIUM SERPL-SCNC: 3.6 MMOL/L (ref 3.5–5.3)
POTASSIUM SERPL-SCNC: 3.9 MMOL/L (ref 3.5–5.3)
PROT SERPL-MCNC: 5.4 G/DL (ref 6.4–8.2)
PROTHROMBIN TIME: 46 SECONDS (ref 11.6–14.5)
RBC # BLD AUTO: 2.14 MILLION/UL (ref 3.88–5.62)
SODIUM SERPL-SCNC: 134 MMOL/L (ref 136–145)
SODIUM SERPL-SCNC: 137 MMOL/L (ref 136–145)
WBC # BLD AUTO: 9.92 THOUSAND/UL (ref 4.31–10.16)

## 2020-09-23 PROCEDURE — 70450 CT HEAD/BRAIN W/O DYE: CPT

## 2020-09-23 PROCEDURE — 84100 ASSAY OF PHOSPHORUS: CPT | Performed by: NURSE PRACTITIONER

## 2020-09-23 PROCEDURE — 99292 CRITICAL CARE ADDL 30 MIN: CPT | Performed by: INTERNAL MEDICINE

## 2020-09-23 PROCEDURE — 94003 VENT MGMT INPAT SUBQ DAY: CPT

## 2020-09-23 PROCEDURE — 84100 ASSAY OF PHOSPHORUS: CPT | Performed by: INTERNAL MEDICINE

## 2020-09-23 PROCEDURE — G1004 CDSM NDSC: HCPCS

## 2020-09-23 PROCEDURE — 99291 CRITICAL CARE FIRST HOUR: CPT | Performed by: PHYSICIAN ASSISTANT

## 2020-09-23 PROCEDURE — 83735 ASSAY OF MAGNESIUM: CPT | Performed by: NURSE PRACTITIONER

## 2020-09-23 PROCEDURE — 94760 N-INVAS EAR/PLS OXIMETRY 1: CPT

## 2020-09-23 PROCEDURE — 80048 BASIC METABOLIC PNL TOTAL CA: CPT | Performed by: INTERNAL MEDICINE

## 2020-09-23 PROCEDURE — 83735 ASSAY OF MAGNESIUM: CPT | Performed by: INTERNAL MEDICINE

## 2020-09-23 PROCEDURE — 85610 PROTHROMBIN TIME: CPT | Performed by: NURSE PRACTITIONER

## 2020-09-23 PROCEDURE — 80053 COMPREHEN METABOLIC PANEL: CPT | Performed by: NURSE PRACTITIONER

## 2020-09-23 PROCEDURE — 99233 SBSQ HOSP IP/OBS HIGH 50: CPT | Performed by: INTERNAL MEDICINE

## 2020-09-23 PROCEDURE — 85025 COMPLETE CBC W/AUTO DIFF WBC: CPT | Performed by: NURSE PRACTITIONER

## 2020-09-23 PROCEDURE — 99232 SBSQ HOSP IP/OBS MODERATE 35: CPT | Performed by: INTERNAL MEDICINE

## 2020-09-23 RX ORDER — PANTOPRAZOLE SODIUM 40 MG/1
40 TABLET, DELAYED RELEASE ORAL
Status: CANCELLED | OUTPATIENT
Start: 2020-09-24

## 2020-09-23 RX ORDER — FUROSEMIDE 40 MG/1
40 TABLET ORAL DAILY
Status: DISCONTINUED | OUTPATIENT
Start: 2020-09-23 | End: 2020-09-24 | Stop reason: HOSPADM

## 2020-09-23 RX ORDER — SPIRONOLACTONE 25 MG/1
100 TABLET ORAL DAILY
Status: CANCELLED | OUTPATIENT
Start: 2020-09-24

## 2020-09-23 RX ORDER — SPIRONOLACTONE 25 MG/1
100 TABLET ORAL DAILY
Status: DISCONTINUED | OUTPATIENT
Start: 2020-09-23 | End: 2020-09-24 | Stop reason: HOSPADM

## 2020-09-23 RX ORDER — LACTULOSE 20 G/30ML
20 SOLUTION ORAL DAILY
Status: CANCELLED | OUTPATIENT
Start: 2020-09-24

## 2020-09-23 RX ORDER — POTASSIUM CHLORIDE 29.8 MG/ML
40 INJECTION INTRAVENOUS ONCE
Status: COMPLETED | OUTPATIENT
Start: 2020-09-23 | End: 2020-09-23

## 2020-09-23 RX ORDER — ALBUTEROL SULFATE 2.5 MG/3ML
2.5 SOLUTION RESPIRATORY (INHALATION) EVERY 4 HOURS PRN
Status: CANCELLED | OUTPATIENT
Start: 2020-09-23

## 2020-09-23 RX ORDER — MIDODRINE HYDROCHLORIDE 5 MG/1
10 TABLET ORAL
Status: CANCELLED | OUTPATIENT
Start: 2020-09-24

## 2020-09-23 RX ORDER — POTASSIUM CHLORIDE 20 MEQ/1
40 TABLET, EXTENDED RELEASE ORAL
Status: CANCELLED | OUTPATIENT
Start: 2020-09-24

## 2020-09-23 RX ORDER — FLUDROCORTISONE ACETATE 0.1 MG/1
0.05 TABLET ORAL DAILY
Status: CANCELLED | OUTPATIENT
Start: 2020-09-24

## 2020-09-23 RX ORDER — MAGNESIUM SULFATE HEPTAHYDRATE 40 MG/ML
2 INJECTION, SOLUTION INTRAVENOUS ONCE
Status: COMPLETED | OUTPATIENT
Start: 2020-09-23 | End: 2020-09-23

## 2020-09-23 RX ORDER — FUROSEMIDE 40 MG/1
40 TABLET ORAL DAILY
Status: CANCELLED | OUTPATIENT
Start: 2020-09-24

## 2020-09-23 RX ADMIN — SPIRONOLACTONE 100 MG: 25 TABLET ORAL at 10:40

## 2020-09-23 RX ADMIN — ALBUMIN (HUMAN) 25 G: 0.25 INJECTION, SOLUTION INTRAVENOUS at 10:08

## 2020-09-23 RX ADMIN — RIFAXIMIN 550 MG: 550 TABLET ORAL at 08:55

## 2020-09-23 RX ADMIN — ALBUMIN (HUMAN) 25 G: 0.25 INJECTION, SOLUTION INTRAVENOUS at 05:01

## 2020-09-23 RX ADMIN — LACTULOSE 20 G: 10 SOLUTION ORAL at 08:55

## 2020-09-23 RX ADMIN — MIDODRINE HYDROCHLORIDE 10 MG: 5 TABLET ORAL at 16:48

## 2020-09-23 RX ADMIN — RIFAXIMIN 550 MG: 550 TABLET ORAL at 20:43

## 2020-09-23 RX ADMIN — FUROSEMIDE 40 MG: 40 TABLET ORAL at 10:40

## 2020-09-23 RX ADMIN — MIDODRINE HYDROCHLORIDE 10 MG: 5 TABLET ORAL at 10:40

## 2020-09-23 RX ADMIN — MAGNESIUM SULFATE IN WATER 2 G: 40 INJECTION, SOLUTION INTRAVENOUS at 18:19

## 2020-09-23 RX ADMIN — MIDODRINE HYDROCHLORIDE 10 MG: 5 TABLET ORAL at 06:32

## 2020-09-23 RX ADMIN — PANTOPRAZOLE SODIUM 40 MG: 40 TABLET, DELAYED RELEASE ORAL at 16:48

## 2020-09-23 RX ADMIN — POTASSIUM CHLORIDE 40 MEQ: 29.8 INJECTION, SOLUTION INTRAVENOUS at 07:50

## 2020-09-23 RX ADMIN — FLUDROCORTISONE ACETATE 0.05 MG: 0.1 TABLET ORAL at 08:55

## 2020-09-23 RX ADMIN — POTASSIUM PHOSPHATE, MONOBASIC POTASSIUM PHOSPHATE, DIBASIC 21 MMOL: 224; 236 INJECTION, SOLUTION, CONCENTRATE INTRAVENOUS at 08:26

## 2020-09-23 RX ADMIN — PANTOPRAZOLE SODIUM 40 MG: 40 TABLET, DELAYED RELEASE ORAL at 06:32

## 2020-09-23 RX ADMIN — POTASSIUM CHLORIDE 40 MEQ: 1500 TABLET, EXTENDED RELEASE ORAL at 06:32

## 2020-09-23 NOTE — PROGRESS NOTES
Progress note - Gastroenterology   Dania Hidalgo 76 y o  male MRN: [id number]  Unit/Bed#: -01 Encounter: [id number]    ASSESSMENT and PLAN    1  MILLER cirrhosis, decompensated by hepatic encephalopathy, coagulopathy, ascites, thrombocytopenia, esophageal varices admitted with sepsis like picture, hypotensive and in acute respiratory failure with lactic acidosis   Volume overloaded, on diuretics with good urinary output   Paracentesis negative for SBP   Ultrasound negative for HCC   CT negative for portal vein thrombus  2  Hypoxic respiratory failure - Improved  On high-flow nasal cannula  3  Chronic liver disease in acute liver failure    MELD 38 today     - Tolerating diet to 2 g sodium  - continue rifaximin and lactulose  - continue lasix and monitor urine output  - Wean O2 to NC  - Stop Octreotide  - Transfer to [facility] surg floor  - Await transfer to [facility]    Chief Complaint   Patient presents with    Weakness - Generalized     patient complaint of "loosing balance and guided self to the floor,landed on buttocks, denies loc, denies head injury, increasing weakness last month       SUBJECTIVE/HPI   Appetite still good  Less short of breath    Diuresing    /65   Pulse 68   Temp 97 6 °F (36 4 °C) (Axillary)   Resp 18   Ht 5' 10" (1 778 m)   Wt 107 kg (235 lb 0 2 oz)   SpO2 94%   BMI 33 72 kg/m²     PHYSICALEXAM  General appearance: alert, appears stated age and cooperative  Eyes: PERLLA, EOMI, significantly icteric   Head: Normocephalic, without obvious abnormality, atraumatic  Lungs: clear to auscultation bilaterally  Heart: regular rate and rhythm, S1, S2 normal, no murmur, click, rub or gallop  Abdomen: soft, non-tender; bowel sounds normal; no masses,  no organomegaly  Extremities: 2+ edema  Neurologic: Grossly normal   No asterixis    Lab Results   Component Value Date    GLUCOSE 122 09/21/2020    CALCIUM 8 4 09/23/2020    K 3 6 09/23/2020    CO2 33 (H) 09/23/2020    CL 97 (L) 09/23/2020 BUN 34 (H) 09/23/2020    CREATININE 1 21 09/23/2020     Lab Results   Component Value Date    WBC 9 92 09/23/2020    HGB 8 1 (L) 09/23/2020    HCT 23 9 (L) 09/23/2020     (H) 09/23/2020    PLT 44 (LL) 09/23/2020     Lab Results   Component Value Date    ALT 40 09/23/2020     (H) 09/23/2020    ALKPHOS 72 09/23/2020       Lab Results   Component Value Date    IRON 73 09/20/2020    TIBC 83 (L) 09/20/2020    FERRITIN 1,467 (H) 09/20/2020     Lab Results   Component Value Date    INR 5 01 (HH) 09/23/2020

## 2020-09-23 NOTE — PROGRESS NOTES
Progress Note - Kristi Renee 1955, 76 y o  male MRN: [id number]    Unit/Bed#: -01 Encounter: [id number]    Primary Care Provider: No primary care provider on file     Date and time admitted to hospital: 9/17/2020  1:08 PM        * [facility])  Assessment & Plan  · Secondary to low oncotic pressure in setting of cirrhosis vs sepsis PNA  · CT chest - b/l GGO with R>L pleural effusions  · UA - positive nitrites only, does not appear to be UTI  · Blood culture - 1/2 staph coag, repeat NG48  · S/p paracentesis -negative for SBP  · Procalcitonin 1 0, 0 92, 0 39, 0 39  · Pt with refractory hypotension following paracentesis, central line and arterial line placed  · Levo off 9/21  · SBP goal >90   · Cont scheduled Albumin and Midodrine    MILLER with portal HTN/ascites/varicies  Assessment & Plan  · Pt sees Dr Toshia Allen of [facility] as outpatient  · PT is scheduled for transplant evaluation with Dr King Garcia when a bed is available   · MELD 35 - consistent with 52% 3 month mortality  · GI following  · Cont lactulose and Rifaximin  · Cont Octreotide infusion  · 9/18 S/p paracentesis with 3 2L fluid removed, negative for SBP    Acute respiratory failure with hypoxia ([facility] [address] )  Assessment & Plan  · Secondary to volume overload in setting of sepsis IVF administration with known decompensated cirrhosis  · Continue HFNC for persistent hypoxia   · Lasix infusion transitioned to lasix 40 IV BID per renal   · Cont Atrovent/Xopenex and Mucinex  · Goal SpO2 >92%    SAMANTHA (acute kidney injury) ([address] )  Assessment & Plan  · Baseline creatinine 1 08  · Current creat 1 19  · Likely secondary to hepatorenal failure  · Nephrology following  · Lasix infusion transitioned to lasix 40 IV BID   · Avoid nephrotoxic medications    Coagulopathy ([address] )  Assessment & Plan  · 2/2 liver failure  · INR trending up - currently 4 83 despite vitamin K 9/20  · Cont to trend INR  · Discussed with GI no FFP/ Vit k   · No active bleeding had some mild hemoptysis     Hyperbilirubinemia  Assessment & Plan  · 2/2 advanced liver disease  · Cont to trend LFT's     Hyponatremia  Assessment & Plan  · Secondary to cirrhosis  · Urine Na 44  · Nephrology following  · Cont diuresis with Lasix  · Current sodium 137    Thrombocytopenia ([facility])  Assessment & Plan  · Secondary to decompensated cirrhosis  · Cont to trend plt  · Transfuse if <20 or signs of active bleeding       Anemia  Assessment & Plan  · Unknown baseline, Hgb on admission 12, now trending 9 s/p IVF resuscitation   · Cont to monitor  · Transfuse if <7, caution with transfusion as pt is transplant candidate     Non-obstructive hypertrophic cardiomyopathy ([facility] [address] )  Assessment & Plan  · Pt with mitral murmur on initial exam  · Previous Echo with severe non-obstructive hypertrophic cardiomyopathy - EF 70%  · Repeat echo - EF 60%, G2DD, mod MVR, mild AS, no regional wall abnormalities  · Hold Nadolol due to hypotension - avoid tachycardia   · Continue midodrine for hypotension     Accidental fall  Assessment & Plan  · Pt with controlled fall to ground  · No LOC  · Head ct read as "possible small subarachnoid hemorrhage" - Discussion with neurosurgery -  They feel it is artifact  · Neuro exams  · No significant fractures on imaging  · Left thigh ecchymosis and swelling - improved since admission     Abnormal CT scan of head  Assessment & Plan  · In the ED - CT head 3mm questionable small SAH  · Neurosurgery reviewed CT, feels this is artifact  · No further imaging or work up warranted     Alkalosis  Assessment & Plan  Metabolic alkalosis with for which bicarb of 34  Continue to monitor      ----------------------------------------------------------------------------------------  HPI/24hr events:  Patient awaiting bed at [facility] for transfer for evaluation for liver transplant  Had some mild hemoptysis yesterday INR is trending up, holding off on continued vitamin K or FFP continue to monitor closely  Disposition: Continue Stepdown Level 1 level of care   Code Status: Level 1 - Full Code  ---------------------------------------------------------------------------------------  SUBJECTIVE  Pt has no active complains at this time     Review of Systems   Constitutional: Positive for chills and fatigue  Negative for appetite change and fever  HENT: Negative for drooling, ear pain, facial swelling, rhinorrhea, trouble swallowing and voice change  Eyes: Negative  Negative for pain and redness  Respiratory: Positive for cough and shortness of breath  Negative for chest tightness, wheezing and stridor  Cardiovascular: Positive for leg swelling  Negative for chest pain and palpitations  Gastrointestinal: Positive for abdominal distention  Negative for abdominal pain, blood in stool, nausea and vomiting  Endocrine: Positive for cold intolerance  Negative for polydipsia, polyphagia and polyuria  Genitourinary: Negative for difficulty urinating, dysuria, flank pain, frequency, hematuria and urgency  Musculoskeletal: Negative  Negative for arthralgias, joint swelling, myalgias, neck pain and neck stiffness  Skin: Positive for color change and pallor  Negative for rash and wound  Allergic/Immunologic: Negative  Negative for environmental allergies, food allergies and immunocompromised state  Neurological: Positive for dizziness  Negative for tremors, seizures, syncope, light-headedness, numbness and headaches  Hematological: Bruises/bleeds easily  Psychiatric/Behavioral: Negative  Negative for agitation, confusion, hallucinations, self-injury and sleep disturbance  The patient is not hyperactive        Review of systems was reviewed and negative unless stated above in HPI/24-hour events   ---------------------------------------------------------------------------------------  OBJECTIVE    Vitals   Vitals:    09/22/20 2200 09/22/20 2321 09/23/20 0000 09/23/20 0400   BP:  99/56     BP Location: Right arm     Pulse: 69 69     Resp: 19 15     Temp:  98 7 °F (37 1 °C)     TempSrc:  Axillary     SpO2: 92%  92% 92%   Weight:       Height:         Temp (24hrs), Av 1 °F (36 7 °C), Min:97 6 °F (36 4 °C), Max:98 7 °F (37 1 °C)  Current: Temperature: 98 7 °F (37 1 °C)  Arterial Line BP: 126/53  Arterial Line MAP (mmHg): 79 mmHg    Respiratory:  SpO2: SpO2: 92 %, SpO2 Activity: SpO2 Activity: At Rest, SpO2 Device: O2 Device: High flow nasal cannula  Nasal Cannula O2 Flow Rate (L/min): 10 L/min    Invasive/non-invasive ventilation settings   Respiratory    Lab Data (Last 4 hours)    None         O2/Vent Data (Last 4 hours)       0400          Non-Invasive Ventilation Mode HFNC                   Physical Exam  Vitals signs and nursing note reviewed  Constitutional:       Appearance: He is well-developed and normal weight  He is ill-appearing and toxic-appearing  HENT:      Head: Normocephalic and atraumatic  Mouth/Throat:      Mouth: Mucous membranes are dry  Eyes:      General: Scleral icterus present  Pupils: Pupils are equal, round, and reactive to light  Neck:      Musculoskeletal: Normal range of motion and neck supple  Cardiovascular:      Rate and Rhythm: Normal rate and regular rhythm  Pulses: Normal pulses  Heart sounds: Normal heart sounds  Pulmonary:      Effort: Pulmonary effort is normal  Tachypnea present  Breath sounds: Decreased air movement present  Decreased breath sounds present  Abdominal:      General: Bowel sounds are normal  There is distension  Palpations: Abdomen is soft  Tenderness: There is no abdominal tenderness  There is no guarding  Genitourinary:     Penis: Normal     Musculoskeletal: Normal range of motion  Right lower leg: Edema present  Left lower leg: Edema present  Skin:     General: Skin is warm and dry  Capillary Refill: Capillary refill takes less than 2 seconds        Coloration: Skin is jaundiced and pale       Findings: Bruising present  Neurological:      Mental Status: He is alert and oriented to person, place, and time  Psychiatric:         Behavior: Behavior normal          Laboratory and Diagnostics:  Results from last 7 days   Lab Units 09/23/20  0508 09/22/20  0438 09/21/20  1606 09/21/20  0634 09/20/20  0518 09/19/20  0845 09/19/20  0505  09/18/20  0508 09/17/20  1846 09/17/20  1320   WBC Thousand/uL 9 92 9 42  --  8 94 9 09  --  10 29*  --  13 40*  --  15 48*   HEMOGLOBIN g/dL 8 1* 8 5*  --  8 7* 8 6*  --  8 2*   < > 9 4*  --  12 1   I STAT HEMOGLOBIN g/dl  --   --  11 6*  --   --  8 5*  --    < >  --   --   --    HEMATOCRIT % 23 9* 25 1*  --  24 7* 25 2*  --  24 4*   < > 27 1*  --  34 9*   HEMATOCRIT, ISTAT %  --   --  34*  --   --  25*  --    < >  --   --   --    PLATELETS Thousands/uL 44* 53*  --  44* 54*  --  56*  --  57* 63* 82*   NEUTROS PCT % 68 66  --  69 75  --   --   --  75  --   --    BANDS PCT %  --   --   --   --   --   --   --   --   --   --  5   MONOS PCT % 8 9  --  7 6  --   --   --  8  --   --    MONO PCT %  --   --   --   --   --   --   --   --   --   --  4    < > = values in this interval not displayed       Results from last 7 days   Lab Units 09/22/20  1616 09/22/20  0437 09/21/20  1754 09/21/20  1606 09/21/20  1158 09/21/20  0718 09/21/20  0005 09/20/20  1806  09/20/20  0731 09/20/20  0521  09/19/20  0505  09/18/20  0508  09/17/20  1320   SODIUM mmol/L 137 136 133*  --  135* 136 135* 135*   < > 135* 136   < > 131*   < > 127*   < > 124*   POTASSIUM mmol/L 3 0* 3 0* 3 8  --  3 2* 3 5 3 5 3 3*   < > 3 3* 3 2*   < > 3 9   < > 4 7   < > 5 0   CHLORIDE mmol/L 96* 95* 95*  --  98* 95* 96* 96*   < > 95* 96*   < > 98*   < > 98*   < > 94*   CO2 mmol/L 34* 34* 31  --  29 32 32 29   < > 30 29   < > 23   < > 23   < > 21   CO2, I-STAT mmol/L  --   --   --  34*  --   --   --   --   --   --   --    < >  --    < >  --   --   --    ANION GAP mmol/L 7 7 7  --  8 9 7 10   < > 10 11   < > 10   < > 6   < > 9   BUN mg/dL 33* 33* 43*  --  35* 36* 38* 38*   < > 39* 40*   < > 41*   < > 38*   < > 36*   CREATININE mg/dL 1 19 1 19 1 41*  --  1 42* 1 27 1 38* 1 52*   < > 1 56* 1 59*   < > 1 84*   < > 1 89*   < > 1 97*   CALCIUM mg/dL 8 8 8 5 8 8  --  8 2* 8 3 8 2* 8 4   < > 7 7* 7 9*   < > 8 0*   < > 7 9*   < > 8 1*   GLUCOSE RANDOM mg/dL 121 101 191*  --  159* 108 113 102   < > 128 128   < > 120   < > 58*   < > 75   ALT U/L  --  49  --   --   --  59  --   --   --  71 70  --  67  --  71  --  99*   AST U/L  --  114*  --   --   --  137*  --   --   --  170* 165*  --  163*  --  173*  --  238*   ALK PHOS U/L  --  76  --   --   --  83  --   --   --  92 90  --  211*  --  122*  --  144*   ALBUMIN g/dL  --  3 3*  --   --   --  3 6  --   --   --  3 2* 3 2*  --  2 6*  --  1 1*  --  1 4*   TOTAL BILIRUBIN mg/dL  --  18 40*  --   --   --  16 80*  --   --   --  15 00* 14 30*  --  11 30*  --  7 70*  --  9 50*    < > = values in this interval not displayed       Results from last 7 days   Lab Units 09/22/20  1616 09/22/20  0437 09/21/20  1158 09/20/20  0745 09/19/20  1129 09/18/20  2221 09/18/20  0508   MAGNESIUM mg/dL 2 2 1 8 1 5* 2 0 2 2 1 9 1 9   PHOSPHORUS mg/dL  --  2 1*  --   --   --   --  3 9      Results from last 7 days   Lab Units 09/22/20  0438 09/21/20  0634 09/20/20  1539 09/20/20  0518 09/19/20  0505 09/18/20  0656 09/18/20  0508 09/17/20  1320   INR  4 83* 4 19* 4 58* 4 89* 3 76* 3 82* 3 07* 3 44*   PTT seconds  --   --   --   --   --   --   --  75*      Results from last 7 days   Lab Units 09/22/20  0438 09/18/20  1616 09/18/20  0508 09/18/20  0014 09/17/20  2058 09/17/20  1846 09/17/20  1320   TROPONIN I ng/mL 0 26* 0 86* 0 61* 0 42* 0 38* 0 31* 0 29*     Results from last 7 days   Lab Units 09/18/20  1616 09/18/20  0508 09/18/20  0014 09/17/20  1536 09/17/20  1335   LACTIC ACID mmol/L 2 4* 2 9* 3 1* 5 1* 5 8*     ABG:    VBG:    Results from last 7 days   Lab Units 09/21/20  1158 09/20/20  0518 09/19/20  0505 09/18/20  0508   PROCALCITONIN ng/ml 0 39* 0 39* 0 92* 1 03*       Micro  Results from last 7 days   Lab Units 09/18/20  1617 09/18/20  1610 09/18/20  1420 09/17/20  1537 09/17/20  1406 09/17/20  1335   BLOOD CULTURE  No Growth After 4 Days  No Growth After 4 Days  --   --  No Growth After 5 Days  Staphylococcus coagulase negative*   GRAM STAIN RESULT   --   --  1+ Polys  No bacteria seen  --   --  Gram positive cocci in clusters*   URINE CULTURE   --   --   --  No Growth <1000 cfu/mL  --   --    BODY FLUID CULTURE, STERILE   --   --  No growth  --   --   --        EKG: NSR  Imaging: I have personally reviewed pertinent reports  Intake and Output  I/O       09/21 0701 - 09/22 0700 09/22 0701 - 09/23 0700    P  O  840 480    I V  (mL/kg) 446 2 (4 2) 438 8 (4 1)    IV Piggyback 450 250    Total Intake(mL/kg) 1736 2 (16 2) 1168 8 (10 9)    Urine (mL/kg/hr) 3450 (1 3) 1770 (0 7)    Stool 0 2    Total Output 3450 1772    Net -1713 8 -603 3          Unmeasured Stool Occurrence 4 x 2 x          Height and Weights   Height: 5' 10" (177 8 cm)  IBW: 73 kg  Body mass index is 33 72 kg/m²  Weight (last 2 days)     Date/Time   Weight    09/22/20 0556   107 (235 01)    09/21/20 0600   106 (232 81)                Nutrition       Diet Orders   (From admission, onward)             Start     Ordered    09/21/20 0948  Diet GI; Lo Fat; Fluid Restriction 1500 ML, Sodium 2 GM  Diet effective now     Question Answer Comment   Diet Type GI    GI Lo Fat    Other Restriction(s): Fluid Restriction 1500 ML    Other Restriction(s): Sodium 2 GM    RD to adjust diet per protocol?  Yes        09/21/20 0947                  Active Medications  Scheduled Meds:  Current Facility-Administered Medications   Medication Dose Route Frequency Provider Last Rate    albumin human  25 g Intravenous Q6H ELIZABETH Castillo Stopped (09/23/20 0534)    albuterol  2 5 mg Nebulization Q4H PRN Lennie Valentino MD      fludrocortisone  0 05 mg Oral Daily ELIZABETH Law      lactulose  20 g Oral Daily Faina Spray, CRNP      midodrine  10 mg Oral TID AC Lizzette HanELIZABETH knowles      octreotide  50 mcg/hr Intravenous Continuous Larissa AxJORGE martinezNP 50 mcg/hr (09/22/20 2245)    pantoprazole  40 mg Oral BID AC ELIZABETH Law      potassium chloride  40 mEq Oral Daily With Breakfast Jo Tesfaye PA-C      rifaximin  550 mg Oral Q12H [address] ELIZABETH Campbell       Continuous Infusions:  octreotide, 50 mcg/hr, Last Rate: 50 mcg/hr (09/22/20 2245)      PRN Meds:   albuterol, 2 5 mg, Q4H PRN        Invasive Devices Review  Invasive Devices     Central Venous Catheter Line            CVC Central Lines 09/18/20 Right Internal jugular 4 days                Rationale for remaining devices:   ---------------------------------------------------------------------------------------  Advance Directive and Living Will:      Power of :    POLST:    ---------------------------------------------------------------------------------------  Care Time Delivered:   Upon my evaluation, this patient had a high probability of imminent or life-threatening deterioration due to [place], elevated INR thrombocytopenia acute liver failure awaiting bed at [facility] for transplant, which required my direct attention, intervention, and personal management  I have personally provided 45 minutes ([phone]) of critical care time, exclusive of procedures, teaching, family meetings, and any prior time recorded by providers other than myself  Heidimarie I Opperman, PA-C      Portions of the record may have been created with voice recognition software  Occasional wrong word or "sound a like" substitutions may have occurred due to the inherent limitations of voice recognition software    Read the chart carefully and recognize, using context, where substitutions have occurred

## 2020-09-23 NOTE — ASSESSMENT & PLAN NOTE
· Pt sees Dr Sandy Luna of [facility] GI as outpatient  · PT is scheduled for transplant evaluation with Dr Nikki Angel when a bed is available   · MELD 35 - consistent with 52% 3 month mortality  · GI following  · Cont lactulose and Rifaximin  · Cont Octreotide infusion  · 9/18 S/p paracentesis with 3 2L fluid removed, negative for SBP

## 2020-09-23 NOTE — NURSING NOTE
Called to bedside by pt sister  Pt complaining of headache and dizzyness  Neuro assessment otherwise unremarkable  Made AP aware  Stat CT ordered

## 2020-09-23 NOTE — ASSESSMENT & PLAN NOTE
· Secondary to cirrhosis  · Urine Na 44  · Nephrology following  · Cont diuresis with Lasix  · Current sodium 137

## 2020-09-23 NOTE — ASSESSMENT & PLAN NOTE
· 2/2 liver failure  · INR trending up - currently 4 83 despite vitamin K 9/20  · Cont to trend INR  · Discussed with GI no FFP/ Vit k   · No active bleeding had some mild hemoptysis

## 2020-09-23 NOTE — CASE MANAGEMENT
LOS: 6 days  Pt is a documented bundle for sepsis  Pt is not a 30 day readmission  Unplanned readmission score is 17 and green  Met with Pt  Pt presents AA&Ox3  Discussed role of , discharge planning, identifying help at home and discharge preference  Pt reports he lives alone in [address] [place] [address], no steps to enter  Pt reports he is independent with adls and ambulation  [facility] [place], has prescription plan and is able to afford medications  Pt reports his POA is his sister(Radha) and he has living will  Pt reports his PCP is Dr Sally Demarco with [facility]  Pt reports he has been to outpt PT in past  Pt denies hx of SNF and VNA  Pt denies hx of mental health and drug and alcohol treatment  Pt reports he will be staying with his sister upon discharge from the hospital  Pt reports his sister lives at [address] [place] [address] in [facility]  Pt reports he will be staying on first floor of sister's house and Pt's sister also has a wheelchair that he can use  Pt reports he is waiting for transfer to [facility] for follow up with liver transplant team  Per critical care team, waiting on bed availability at [facility] to follow

## 2020-09-23 NOTE — PROGRESS NOTES
NEPHROLOGY PROGRESS NOTE   Elis Ferro 76 y o  male MRN: [id number]  Unit/Bed#: -01 Encounter: [id number]  Reason for Consult: SAMANTHA (POA)    ASSESSMENT/PLAN:  SAMANTHA:  Suspected secondary to hepatorenal syndrome with decrease in effective circulatory volume   -presents with creatinine 1 97   -creatinine stable at 1 1 to 1 2   -baseline creatinine in 2018 around 1 0    -continues on midodrine and Florinef   -previously on Lasix drip    -will start Lasix 40 mg daily and spironolactone 100 mg daily  -status post IV contrast 9/17   -CK level trended down  -CT scan negative for hydro  -UA showed glucose, small blood, trace ketones, positive nitrites, trace protein, 30-50 WBCs  -urine culture negative   -status post Roberts catheter removal, check bladder scan PVR  -consent obtained for RRT and in chart if needed   -no urgent indication for RRT  -continue to avoid nephrotoxins, hypotension, IV contrast   -I/O      Hemodynamics:  Blood pressure is fluctuating   Periods of hypotension with systolic blood pressure dropping into the 80s   Echo shows EF of 60% with grade II DD   -off of pressors    -avoid episodes of hypotension or high fluctuations in blood pressure   -recommend holding parameters for antihypertensive for systolic blood pressure less than 130 mmHg    -home antihypertensives on hold  -IV albumin discontinued today   -continue midodrine 10 mg t i d and florinef 0 05 mg po daily      Hypokalemia:  In the setting of diuresis with Lasix drip and Hypomagnesemia  (resolved)  -Mag level  2 0   -continue to monitor replace as needed   -aldactone started today      Hyponatremia:  Likely secondary to cirrhosis  (resolved)  -continue fluid restriction  -urine osmolality 289, urine sodium 44, serum osmolality 295, normal a m   Cortisol       Hypophosphatemia:  Phosphorus level remains low at 2 1   -continue to monitor replace as needed      Acute respiratory failure with hypoxia: in the setting of suspected PNA  On mid flow O2      -repeat chest x-ray shows near resolution of B/L ground glass opacities  -CT scan showed small right greater then left pleural effusions    -oral diuretics restarted today      Tillman cirrhosis:  With portal hypertension, ascites, and varices   -scheduled for transplant evaluation as outpatient   -awaiting bed availability at [facility]  -octreotide discontinued today  -s/p paracentesis for 3 2 L 9/18 of fluid removal   Negative for SBP   -discontinue Lasix gtt     -continues on rifaximin and lactulose      Anemia with chronic Thrombocytopenia:  In the setting of cirrhosis  -will continue to monitor and trend per primary team   -hemolysis smear negative for schistocytes or helmet cells  -iron panel shows normal iron sat and normal iron        Disposition:  Awaiting transfer to [address] [place] [address]:  The patient is sitting out of bed in his chair  He remains on nasal oxygen  He denies chest pain  He denies nausea or vomiting  He states that he is experiencing some abdominal discomfort related to gas  He feels as if his abdomen is slightly distended  He feels as if he is urinating less      OBJECTIVE:  Current Weight: Weight - Scale: 107 kg (235 lb 0 2 oz)  Vitals:    09/23/20 0500 09/23/20 0502 09/23/20 0600 09/23/20 0715   BP:  125/67  119/65   BP Location:  Right arm     Pulse: 79 71 68 68   Resp: 18 19 17 18   Temp:    97 6 °F (36 4 °C)   TempSrc:    Axillary   SpO2: 91% 90% 92% 94%   Weight:       Height:           Intake/Output Summary (Last 24 hours) at 9/23/2020 1121  Last data filed at 9/23/2020 0900  Gross per 24 hour   Intake 2560 ml   Output 1780 ml   Net 780 ml     General: NAD  Skin: warm, dry, intact, jaundice  HEENT: Moist mucous membranes, sclera anicteric, normocephalic, atraumatic  Neck: No apparent JVD appreciated  Chest: lung sounds clear B/L, on mid flow oxygen   CVS:Regular rate and rhythm, no murmer   Abdomen: Soft, round, non-tender, +BS, distended  Extremities:B/L LE edema present, left greater than right  Neuro: alert and oriented  Psych: appropriate mood and affect     Medications:    Current Facility-Administered Medications:     albuterol inhalation solution 2 5 mg, 2 5 mg, Nebulization, Q4H PRN, Cameron George MD    fludrocortisone (FLORINEF) tablet 0 05 mg, 0 05 mg, Oral, Daily, [place] Shave, CRNP, 0 05 mg at 09/23/20 0855    furosemide (LASIX) tablet 40 mg, 40 mg, Oral, Daily, Emiliano Maxin, CRNP, 40 mg at 09/23/20 1040    lactulose 20 g/30 mL oral solution 20 g, 20 g, Oral, Daily, Asia Krueger, CRNP, 20 g at 09/23/20 0855    midodrine (PROAMATINE) tablet 10 mg, 10 mg, Oral, TID AC, Lizzette Case CRNP, 10 mg at 09/23/20 1040    pantoprazole (PROTONIX) EC tablet 40 mg, 40 mg, Oral, BID AC, Mary Jung, CRNP, 40 mg at 09/23/20 0102    potassium chloride (K-DUR,KLOR-CON) CR tablet 40 mEq, 40 mEq, Oral, Daily With Breakfast, Jo Tesfaye PA-C, 40 mEq at 09/23/20 7028    potassium chloride 40 mEq IVPB (premix), 40 mEq, Intravenous, Once, JORGE EspinoNP, Last Rate: 25 mL/hr at 09/23/20 0750, 40 mEq at 09/23/20 0750    potassium phosphates 21 mmol in sodium chloride 0 9 % 250 mL infusion, 21 mmol, Intravenous, Once, Sarah Lock CRNP, Last Rate: 62 5 mL/hr at 09/23/20 0826, 21 mmol at 09/23/20 0826    rifaximin (XIFAXAN) tablet 550 mg, 550 mg, Oral, Q12H [facility], ELIZABETH Campbell, 550 mg at 09/23/20 0855    spironolactone (ALDACTONE) tablet 100 mg, 100 mg, Oral, Daily, Emiliano Maxin, CRNP, 100 mg at 09/23/20 1040    Laboratory Results:  Results from last 7 days   Lab Units 09/23/20  0508 09/22/20  1616 09/22/20  0438 09/22/20  0437  09/21/20  1606  09/21/20  0718 09/21/20  0634  09/19/20  0845  09/18/20  1434  09/18/20  0508   WBC Thousand/uL 9 92  --  9 42  --   --   --   --   --  8 94   < >  --    < >  --   --  13 40*   HEMOGLOBIN g/dL 8 1*  --  8 5*  --   --   --   --   --  8 7*   < >  --    < >  --    < > 9 4*   I STAT HEMOGLOBIN g/dl  --   --   --   --   --  11 6*  --   --   --   --  8 5*  --  9 2*   < >  --    HEMATOCRIT % 23 9*  --  25 1*  --   --   --   --   --  24 7*   < >  --    < >  --    < > 27 1*   HEMATOCRIT, ISTAT %  --   --   --   --   --  34*  --   --   --   --  25*  --  27*   < >  --    PLATELETS Thousands/uL 44*  --  53*  --   --   --   --   --  44*   < >  --    < >  --   --  57*   SODIUM mmol/L 137 137  --  136   < >  --    < > 136  --    < >  --    < >  --   --  127*   POTASSIUM mmol/L 3 6 3 0*  --  3 0*   < >  --    < > 3 5  --    < >  --    < >  --   --  4 7   CHLORIDE mmol/L 97* 96*  --  95*   < >  --    < > 95*  --    < >  --    < >  --   --  98*   CO2 mmol/L 33* 34*  --  34*   < >  --    < > 32  --    < >  --    < >  --   --  23   CO2, I-STAT mmol/L  --   --   --   --   --  34*  --   --   --   --  23  --  22   < >  --    BUN mg/dL 34* 33*  --  33*   < >  --    < > 36*  --    < >  --    < >  --   --  38*   CREATININE mg/dL 1 21 1 19  --  1 19   < >  --    < > 1 27  --    < >  --    < >  --   --  1 89*   CALCIUM mg/dL 8 4 8 8  --  8 5   < >  --    < > 8 3  --    < >  --    < >  --   --  7 9*   MAGNESIUM mg/dL 2 0 2 2  --  1 8  --   --    < >  --   --    < >  --    < >  --   --  1 9   PHOSPHORUS mg/dL 2 1*  --   --  2 1*  --   --   --   --   --   --   --   --   --   --  3 9   ALK PHOS U/L 72  --   --  76  --   --   --  83  --    < >  --    < >  --   --  122*   ALT U/L 40  --   --  49  --   --   --  59  --    < >  --    < >  --   --  71   AST U/L 100*  --   --  114*  --   --   --  137*  --    < >  --    < >  --   --  173*   GLUCOSE, ISTAT mg/dl  --   --   --   --   --  122  --   --   --   --  117  --  97  --   --     < > = values in this interval not displayed

## 2020-09-24 ENCOUNTER — EPISODE CHANGES (OUTPATIENT)
Dept: CASE MANAGEMENT | Facility: OTHER | Age: 65
End: 2020-09-24

## 2020-09-24 NOTE — PROGRESS NOTES
Report given to [facility] and Tia Franklin, RN at [place]  Pt transferred with [facility] CVC and mundo  Pt being transferred to 6th floor Pradip Prakash, room 6520    Phone [phone]

## 2020-09-24 NOTE — DISCHARGE SUMMARY
Discharge Summary - Medical Davey Donahue 76 y o  male MRN: [id number]    [facility] ICU Room / Bed: /- Encounter: [id number]    BRIEF OVERVIEW    Admitting Provider: Maite Simmons MD  Discharge Provider: Maite Simmons MD  Admission Date: 9/17/2020     Discharge Date: No discharge date for patient encounter  Primary Care Physician at Discharge: No primary care provider on file   None    Primary Discharge Diagnosis  Principal Problem:    Shock ([address] )  Active Problems:    MILLER with portal HTN/ascites/varicies    Acute respiratory failure with hypoxia (HCC)    SAMANTHA (acute kidney injury) (HCC)    Coagulopathy (HCC)    Hyperbilirubinemia    Thrombocytopenia (HCC)    Anemia    Non-obstructive hypertrophic cardiomyopathy (HCC)    Accidental fall    Abnormal CT scan of head    Alkalosis  Resolved Problems:    Lactic acidemia    Hypoglycemia    Elevated CK    Left upper quadrant abdominal pain    Hyponatremia    Hypokalemia    Elevated troponin level not due myocardial infarction    Low magnesium level    Positive blood culture    Hypophosphatemia      Other Problems Addressed  Patient Active Problem List    Diagnosis Date Noted    Shock ([address] ) 09/17/2020     Priority: A    MILLER with portal HTN/ascites/varicies 09/17/2020     Priority: B    Acute respiratory failure with hypoxia ([address] ) 09/18/2020     Priority: C    SAMANTHA (acute kidney injury) ([address] ) 09/17/2020     Priority: D    Coagulopathy ([address] ) 09/17/2020     Priority: E    Hyperbilirubinemia 09/17/2020     Priority: F    Thrombocytopenia [address] ) 09/17/2020     Priority: H    Anemia 09/18/2020     Priority: I    Non-obstructive hypertrophic cardiomyopathy ([facility] ) 09/17/2020     Priority: J    Accidental fall 09/17/2020     Priority: K    Abnormal CT scan of head 09/18/2020     Priority: L    Alkalosis 09/22/2020    Volume overload 09/18/2020    Ascites 09/17/2020    Abdominal aortic aneurysm (AAA) without rupture ([facility] [address] 09/17/2020    History of difficult intubation 09/17/2020    Ecchymosis 09/17/2020       Consulting Providers   Nephrology and Gastroenterology  Therapeutic Operative Procedures Performed  Paracentesis  Central line- right IJ      Discharge Disposition: Final discharge disposition not confirmed  Facility:  Transferred to [facility]  Discharged With Lines: yes    Type:  Right IJ central line  Reason for line:  Vascular access  Line managed by:  RN        Test Results Pending at Discharge  Blood cultures no growth after 4 days  Body fluid cultures from paracentesis showed +1 polyps no bacteria on Gram stain  Medications   See after visit summary for reconciled discharge medications provided to patient and family        Allergies  Allergies   Allergen Reactions    Keflex [Cephalexin]      Diet restrictions: End-stage liver disease  Activity restrictions: Fall precaution  Discharge Condition: stable      Code Status: Level 1 - Full Code  Advance Directive and Living Will: <no information>  Power of :    POLST:          [address] STAY    Diagnostic Procedures Performed  labs: see attached documentation , microbiology: blood culture: negative, urine culture: negative and Body fluid cultures +1 polyps and radiology: CXR: Nearly resolved bilateral ground-glass opacities  Treatments   IV hydration, cardiac meds: furosemide, steroids: Florinef, respiratory therapy: HHFM and procedures: arterial line, internal jugular line and paracentesis  Procedures   internal jugular line and paracentesis  Other Pertinent Test Results  See attached  Presenting Problem/History of Present Illness  Shock [facility])  Hospital Course  50-year-old man with Colon Slot cirrhosis meld on admission was 40, decompensated hepatic encephalopathy coagulopathy ascites thrombocytopenia esophageal varices admitted for sepsis like picture with hypotension in acute respiratory failure with a lactic acidosis, for worsening liver decompensation continued ICU care until 9/21, transferred to step-down and Med surge  Patient was in the beginning on admission on BiPAP which was weaned to high-flow nasal cannula and then to mid flow, he was continued on lactulose and Xifaxan and was started on midodrine for hypotension he had an A-line placed had a period of Levophed for 2 days  On admission patient had a CT scan done which showed bilateral consolidation atelectasis with pleural effusion significant subcu edema, CT of the head showed a small subarachnoid which was determined to be artifact, neurosurgery commented on it, a repeat head CT on 09/22 showed no bleed  Patient during admission to the ED was resuscitated with 3 8 L of crystalloid and was started on broad band antibiotics patient remains mildly hypertensive, he was placed on Levophed at 4 mics, octreotide and Lasix at 20 milligrams/hour, received 1 dose of vitamin K 10 mg for elevated INR and thrombocytopenia, was started on Zosyn and DC on 09/20  GI reach out to [facility] for transfer for liver transplant evaluation  Awaiting bed since 09/20, Octitride was DC, Lasix drip was DC  and changed to b i d, started on a diet rifaximin and lactulose was continued   awaiting transfer  Patient had an ultrasound with Dopplers to rule out a portal vein thrombosis and obtained a paracentesis which ruled out SBP    Discharge  Statement   I spent 30 minutes discharging the patient  This time was spent on the day of discharge  I had direct contact with the patient on the day of discharge  Additional documentation is required if more than 30 minutes were spent on discharge

## 2020-09-25 ENCOUNTER — PATIENT OUTREACH (OUTPATIENT)
Dept: CASE MANAGEMENT | Facility: OTHER | Age: 65
End: 2020-09-25

## 2020-09-25 NOTE — PROGRESS NOTES
Incoming in-basket message regarding new BPI patient  Patient was discharged from [facility] 9/24/2020 and transferred to [facility] in [place] for further evaluation of worsening liver disease  This OPCM will follow chart electronically for progress toward D/C and ANGEL

## 2020-10-05 ENCOUNTER — PATIENT OUTREACH (OUTPATIENT)
Dept: CASE MANAGEMENT | Facility: OTHER | Age: 65
End: 2020-10-05

## 2020-10-20 ENCOUNTER — PATIENT OUTREACH (OUTPATIENT)
Dept: CASE MANAGEMENT | Facility: OTHER | Age: 65
End: 2020-10-20

## 2021-04-25 NOTE — PROGRESS NOTES
Progress note - Gastroenterology   Lisa Amaya 76 y o  male MRN: [id number]  Unit/Bed#: -01 Encounter: [id number]    ASSESSMENT and PLAN    1  MILLER cirrhosis, meld 40, decompensated by hepatic encephalopathy, coagulopathy, ascites, thrombocytopenia, esophageal varices admitted with sepsis like picture, hypotensive and in acute respiratory failure with lactic acidosis  Volume overloaded, on diuretics  Paracentesis negative for SBP  Ultrasound negative for HCC  CT negative for portal vein thrombus  2  Hypoxic respiratory failure  3  Sees liver disease in acute liver failure     Worsening coagulopathy  Patient was accepted for transfer to [facility]   However, there are no beds to accept the transfer  Awaiting a bed, and in the meantime agree with supportive care per ICU team   Continue antibiotics  Okay to hold the lactulose given the NPO status on BiPAP  Chief Complaint   Patient presents with    Weakness - Generalized     patient complaint of "loosing balance and guided self to the floor,landed on buttocks, denies loc, denies head injury, increasing weakness last month       SUBJECTIVE/HPI   Worsening respiratory failure, continues to be on BiPAP  Desaturated down to 85% even on high-flow nasal cannula  Evidence of volume overload  Transferred to [facility] has been approved but no beds available at this time  /61   Pulse 70   Temp 97 5 °F (36 4 °C) (Axillary)   Resp 13   Ht 5' 10" (1 778 m)   Wt 120 kg (264 lb 15 9 oz)   SpO2 98%   BMI 38 02 kg/m²     PHYSICALEXAM  General appearance:  Alert and awake on BiPAP, appears uncomfortable and ill-appearing  Eyes: ALIN, EOMI, no scleral icterus   Head: Normocephalic, without obvious abnormality, atraumatic  Lungs:  Diffuse crackles and wheezing  Tachypneic    Heart:  Tachycardic rate and rhythm, S1, S2 normal, no murmur, click, rub or gallop  Abdomen: soft, mild left upper quadrant tender, distended; bowel sounds normal; no Per patient, no previous hx of afib. Patient was in sinus tachycardia/NSR when he initially came to ED. Patient then converted to afib. HR: 90s-100s.   [id number] score of 4, therefore started on full dose AC.   - Hep gtt started, will transition to NOAC when stable.   - Started low dose Lopressor 12.5mg q12 , with HOLD parameters.   - Monitor on telemetry.   - Cardiology consult obtained, appreciate recommendations. masses,  no organomegaly  Extremities: extremities normal, atraumatic, no clubbing or cyanosis    Bilateral LE edema  Neurologic: Grossly normal, AAOx3, no asterixis    Lab Results   Component Value Date    GLUCOSE 117 09/19/2020    CALCIUM 7 7 (L) 09/19/2020    K 3 5 09/19/2020    CO2 23 09/19/2020    CL 97 (L) 09/19/2020    BUN 44 (H) 09/19/2020    CREATININE 1 84 (H) 09/19/2020     Lab Results   Component Value Date    WBC 10 29 (H) 09/19/2020    HGB 8 5 (L) 09/19/2020    HCT 25 (L) 09/19/2020     (H) 09/19/2020    PLT 56 (L) 09/19/2020     Lab Results   Component Value Date    ALT 67 09/19/2020     (H) 09/19/2020    ALKPHOS 211 (H) 09/19/2020     No results found for: AMYLASE  No results found for: LIPASE  No results found for: IRON, TIBC, FERRITIN  Lab Results   Component Value Date    INR 3 76 (H) 09/19/2020

## 2023-08-14 NOTE — ASSESSMENT & PLAN NOTE
· Secondary to volume overload in setting of sepsis IVF administration with known decompensated cirrhosis  · Continues to require Bipap and HFNC for persistent hypoxia 14/8 50%  · On HFNC yesterday for a few hours, tolerated well  · Cont diuresis with Lasix infusion- decreased to 20/hr yesterday   · CT chest - R>L pleural effusions, may need thoracentesis if no improvement with diuresis   · Cont Atrovent/Xopenex and Mucinex  · Goal SpO2 >92%  · If bed at [facility] becomes available pt will need to be intubated prior to transport, pt has history of difficult intubation, will need anesthesia at bedside Chonodrocutaneous Helical Advancement Flap Text: The defect edges were debeveled with a #15 scalpel blade. Given the location of the defect and the proximity to free margins a chondrocutaneous helical advancement flap was deemed most appropriate. Using a sterile surgical marker, the appropriate advancement flap was drawn incorporating the defect and placing the expected incisions within the relaxed skin tension lines where possible. The area thus outlined was incised deep to adipose tissue with a #15 scalpel blade. The skin margins were undermined to an appropriate distance in all directions utilizing iris scissors. Following this, the designed flap was advanced and carried over into the primary defect and sutured into place.

## (undated) DEVICE — SUT VICRYL 3-0 SH 27 IN J416H

## (undated) DEVICE — SPONGE LAP 18 X 18 IN STRL RFD

## (undated) DEVICE — TAUT CATH INTRODUCER 4.5 FR

## (undated) DEVICE — MEDI-VAC YANKAUER SUCTION HANDLE W/BULBOUS AND CONTROL VENT: Brand: CARDINAL HEALTH

## (undated) DEVICE — X-RAY DETECTABLE SPONGES,16 PLY: Brand: VISTEC

## (undated) DEVICE — INTENDED FOR TISSUE SEPARATION, AND OTHER PROCEDURES THAT REQUIRE A SHARP SURGICAL BLADE TO PUNCTURE OR CUT.: Brand: BARD-PARKER SAFETY BLADES SIZE 11, STERILE

## (undated) DEVICE — ENDOPOUCH RETRIEVER SPECIMEN RETRIEVAL BAGS: Brand: ENDOPOUCH RETRIEVER

## (undated) DEVICE — TUBING INJECTOR HIGH PRESSURE 91051482

## (undated) DEVICE — STOPCOCK 4-WAY

## (undated) DEVICE — PINNACLE R/O II INTRODUCER SHEATH WITH RADIOPAQUE MARKER: Brand: PINNACLE

## (undated) DEVICE — DRESSING TELFA 2 X 3 IN STRL

## (undated) DEVICE — LIGACLIP 10-M/L, 10MM ENDOSCOPIC ROTATING MULTIPLE CLIP APPLIERS: Brand: LIGACLIP

## (undated) DEVICE — CONMED ACCESSORY ELECTRODE, FLAT BLADE WITH EXTENDED INSULATION: Brand: CONMED

## (undated) DEVICE — SUT PROLENE 2-0 CT-2 30 IN 8411H

## (undated) DEVICE — VIAL DECANTER

## (undated) DEVICE — ANGIOGRAPHIC CATHETER: Brand: IMAGER™ II

## (undated) DEVICE — CHLORAPREP HI-LITE 26ML ORANGE

## (undated) DEVICE — ADHESIVE SKN CLSR HISTOACRYL FLEX 0.5ML LF

## (undated) DEVICE — DILATOR: Brand: COOK

## (undated) DEVICE — Device

## (undated) DEVICE — GLOVE SRG BIOGEL ORTHOPEDIC 7.5

## (undated) DEVICE — CATH BAL OCCLUSION CODA 34

## (undated) DEVICE — SYRINGE 30ML LL

## (undated) DEVICE — 1200CC GUARDIAN II: Brand: GUARDIAN

## (undated) DEVICE — LIGAMAX 5 MM ENDOSCOPIC MULTIPLE CLIP APPLIER: Brand: LIGAMAX

## (undated) DEVICE — INTENDED FOR TISSUE SEPARATION, AND OTHER PROCEDURES THAT REQUIRE A SHARP SURGICAL BLADE TO PUNCTURE OR CUT.: Brand: BARD-PARKER SAFETY BLADES SIZE 15, STERILE

## (undated) DEVICE — PROXIMATE PLUS MD MULTI-DIRECTIONAL RELEASE SKIN STAPLERS CONTAINS 35 STAINLESS STEEL STAPLES APPROXIMATE CLOSED DIMENSIONS: 6.9MM X 3.9MM WIDE: Brand: PROXIMATE

## (undated) DEVICE — SCD SEQUENTIAL COMPRESSION COMFORT SLEEVE MEDIUM KNEE LENGTH: Brand: KENDALL SCD

## (undated) DEVICE — ENDOPATH XCEL UNIVERSAL TROCAR STABLILITY SLEEVES: Brand: ENDOPATH XCEL

## (undated) DEVICE — GLOVE INDICATOR PI UNDERGLOVE SZ 8 BLUE

## (undated) DEVICE — SUT MONOCRYL 4-0 PS-2 27 IN Y426H

## (undated) DEVICE — ULTRACLEAN ACCESSORY ELECTRODE 1" (2.54 CM) COATED BLADE: Brand: ULTRACLEAN

## (undated) DEVICE — ENDOPATH XCEL BLADELESS TROCARS WITH STABILITY SLEEVES: Brand: ENDOPATH XCEL

## (undated) DEVICE — IRRIG ENDO FLO TUBING

## (undated) DEVICE — COVER PROBE INTRAOPERATIVE 6 X 96 IN

## (undated) DEVICE — DRAPE EQUIPMENT RF WAND

## (undated) DEVICE — SYRINGE 50ML LL

## (undated) DEVICE — STERILE ICS CARDIOVASCULAR PK: Brand: CARDINAL HEALTH

## (undated) DEVICE — BUTTON SWITCH PENCIL HOLSTER: Brand: VALLEYLAB

## (undated) DEVICE — ALLENTOWN LAP CHOLE APP PACK: Brand: CARDINAL HEALTH

## (undated) DEVICE — CATH DIAG 5FR .035 70CM PIG CSC 20

## (undated) DEVICE — SUT VICRYL 0 UR-6 27 IN J603H

## (undated) DEVICE — SNAP KOVER: Brand: UNBRANDED

## (undated) DEVICE — TUBING SUCTION 5MM X 12 FT

## (undated) DEVICE — MICROPUNCTURE 501

## (undated) DEVICE — NON-DEHP HIGH FLOW RATE EXTENSION SET, MALE LUER LOCK ADAPTER

## (undated) DEVICE — IV SET 15 DROP STERILE 0/Y GRAVITY

## (undated) DEVICE — SPONGE 4 X 4 XRAY 16 PLY STRL LF RFD

## (undated) DEVICE — BETHLEHEM UNIVERSAL MINOR GEN: Brand: CARDINAL HEALTH

## (undated) DEVICE — GUIDEWIRE AMPLATZ .035 260CM 6CM ST SS

## (undated) DEVICE — CATH DIAG 5FR .035 90CM PIG

## (undated) DEVICE — GLOVE SRG BIOGEL 6.5

## (undated) DEVICE — STERILE MAJOR GENERAL PACK: Brand: CARDINAL HEALTH

## (undated) DEVICE — TRAY FOLEY 16FR URIMETER SURESTEP

## (undated) DEVICE — MAX-CORE® DISPOSABLE CORE BIOPSY INSTRUMENT, 18G X 20CM: Brand: MAX-CORE

## (undated) DEVICE — RADIFOCUS GLIDEWIRE: Brand: GLIDEWIRE

## (undated) DEVICE — INFUSER BAG 500ML

## (undated) DEVICE — BLUE HEAT SCOPE WARMER

## (undated) DEVICE — ENDOPATH 5MM CURVED SCISSORS WITH MONOPOLAR CAUTERY: Brand: ENDOPATH

## (undated) DEVICE — GLOVE INDICATOR PI UNDERGLOVE SZ 6.5 BLUE

## (undated) DEVICE — SYRINGE KIT,PACKAGED,,150FT,MK 7(ANGIO-ARTERION, 150ML SYR KIT W/QFT,MC)(60729385): Brand: MEDRAD® MARK 7 ARTERION DISPOSABLE SYRINGE 150 ML WITH QUICK FILL TUBE

## (undated) DEVICE — ELECTRODE LAP L WIRE E-Z CLEAN 33CM -0100

## (undated) DEVICE — REM POLYHESIVE ADULT PATIENT RETURN ELECTRODE: Brand: VALLEYLAB

## (undated) DEVICE — FLUID MANAGEMENT KIT - IR

## (undated) DEVICE — DESTINATION PERIPHERAL GUIDING SHEATH: Brand: DESTINATION

## (undated) DEVICE — 2000CC GUARDIAN II: Brand: GUARDIAN

## (undated) DEVICE — IV CATH 14 G SAFETY

## (undated) DEVICE — DRAPE C-ARM X-RAY

## (undated) DEVICE — TUBING SMOKE EVAC W/FILTRATION DEVICE PLUMEPORT ACTIV

## (undated) DEVICE — STOPCOCK 4 WAY LL HIGH PRESSURE

## (undated) DEVICE — 3M™ IOBAN™ 2 ANTIMICROBIAL INCISE DRAPE 6651EZ: Brand: IOBAN™ 2

## (undated) DEVICE — GLOVE SRG BIOGEL ECLIPSE 8

## (undated) DEVICE — MAYO STAND COVER: Brand: CONVERTORS

## (undated) DEVICE — NEEDLE 25G X 1 1/2

## (undated) DEVICE — ENDOPATH XCEL BLUNT TIP TROCARS WITH SMOOTH SLEEVES: Brand: ENDOPATH XCEL

## (undated) DEVICE — 3000CC GUARDIAN II: Brand: GUARDIAN

## (undated) DEVICE — INTRO SHEATH 12FR .038 30CM CHECK-FLO